# Patient Record
Sex: MALE | Race: WHITE | NOT HISPANIC OR LATINO | Employment: OTHER | URBAN - METROPOLITAN AREA
[De-identification: names, ages, dates, MRNs, and addresses within clinical notes are randomized per-mention and may not be internally consistent; named-entity substitution may affect disease eponyms.]

---

## 2023-04-26 ENCOUNTER — HOSPITAL ENCOUNTER (INPATIENT)
Facility: HOSPITAL | Age: 63
LOS: 2 days | Discharge: HOME | DRG: 38 | End: 2023-04-29
Attending: EMERGENCY MEDICINE | Admitting: INTERNAL MEDICINE
Payer: COMMERCIAL

## 2023-04-26 ENCOUNTER — APPOINTMENT (OUTPATIENT)
Dept: CT IMAGING | Facility: HOSPITAL | Age: 63
DRG: 38 | End: 2023-04-26
Payer: COMMERCIAL

## 2023-04-26 DIAGNOSIS — I65.23 INTERNAL CAROTID ARTERY STENOSIS, BILATERAL: ICD-10-CM

## 2023-04-26 DIAGNOSIS — I65.23 BILATERAL CAROTID ARTERY STENOSIS: Chronic | ICD-10-CM

## 2023-04-26 DIAGNOSIS — H53.9 CHANGES IN VISION: Primary | ICD-10-CM

## 2023-04-26 DIAGNOSIS — Q21.12 PFO (PATENT FORAMEN OVALE): ICD-10-CM

## 2023-04-26 LAB
ALBUMIN SERPL-MCNC: 4.1 G/DL (ref 3.5–5.2)
ALBUMIN/GLOB SERPL: 1.6 G/DL
ALP SERPL-CCNC: 67 U/L (ref 39–117)
ALT SERPL W P-5'-P-CCNC: 8 U/L (ref 1–41)
ANION GAP SERPL CALCULATED.3IONS-SCNC: 11.4 MMOL/L (ref 5–15)
APTT PPP: 26.8 SECONDS (ref 22.7–35.4)
AST SERPL-CCNC: 12 U/L (ref 1–40)
BASOPHILS # BLD AUTO: 0.04 10*3/MM3 (ref 0–0.2)
BASOPHILS NFR BLD AUTO: 0.4 % (ref 0–1.5)
BILIRUB SERPL-MCNC: 0.3 MG/DL (ref 0–1.2)
BUN SERPL-MCNC: 14 MG/DL (ref 8–23)
BUN/CREAT SERPL: 15.1 (ref 7–25)
CALCIUM SPEC-SCNC: 9.4 MG/DL (ref 8.6–10.5)
CHLORIDE SERPL-SCNC: 106 MMOL/L (ref 98–107)
CO2 SERPL-SCNC: 25.6 MMOL/L (ref 22–29)
CREAT SERPL-MCNC: 0.93 MG/DL (ref 0.76–1.27)
DEPRECATED RDW RBC AUTO: 46 FL (ref 37–54)
EGFRCR SERPLBLD CKD-EPI 2021: 91.7 ML/MIN/1.73
EOSINOPHIL # BLD AUTO: 0.14 10*3/MM3 (ref 0–0.4)
EOSINOPHIL NFR BLD AUTO: 1.5 % (ref 0.3–6.2)
ERYTHROCYTE [DISTWIDTH] IN BLOOD BY AUTOMATED COUNT: 14.5 % (ref 12.3–15.4)
GLOBULIN UR ELPH-MCNC: 2.6 GM/DL
GLUCOSE SERPL-MCNC: 122 MG/DL (ref 65–99)
HBA1C MFR BLD: 5.5 % (ref 4.8–5.6)
HCT VFR BLD AUTO: 33.9 % (ref 37.5–51)
HGB BLD-MCNC: 11.6 G/DL (ref 13–17.7)
IMM GRANULOCYTES # BLD AUTO: 0.02 10*3/MM3 (ref 0–0.05)
IMM GRANULOCYTES NFR BLD AUTO: 0.2 % (ref 0–0.5)
INR PPP: 1.04 (ref 0.9–1.1)
LYMPHOCYTES # BLD AUTO: 2.02 10*3/MM3 (ref 0.7–3.1)
LYMPHOCYTES NFR BLD AUTO: 22.1 % (ref 19.6–45.3)
MCH RBC QN AUTO: 29.5 PG (ref 26.6–33)
MCHC RBC AUTO-ENTMCNC: 34.2 G/DL (ref 31.5–35.7)
MCV RBC AUTO: 86.3 FL (ref 79–97)
MONOCYTES # BLD AUTO: 0.66 10*3/MM3 (ref 0.1–0.9)
MONOCYTES NFR BLD AUTO: 7.2 % (ref 5–12)
NEUTROPHILS NFR BLD AUTO: 6.28 10*3/MM3 (ref 1.7–7)
NEUTROPHILS NFR BLD AUTO: 68.6 % (ref 42.7–76)
NRBC BLD AUTO-RTO: 0 /100 WBC (ref 0–0.2)
PLATELET # BLD AUTO: 260 10*3/MM3 (ref 140–450)
PMV BLD AUTO: 8.5 FL (ref 6–12)
POTASSIUM SERPL-SCNC: 4 MMOL/L (ref 3.5–5.2)
PROT SERPL-MCNC: 6.7 G/DL (ref 6–8.5)
PROTHROMBIN TIME: 13.7 SECONDS (ref 11.7–14.2)
RBC # BLD AUTO: 3.93 10*6/MM3 (ref 4.14–5.8)
SODIUM SERPL-SCNC: 143 MMOL/L (ref 136–145)
T4 FREE SERPL-MCNC: 1.26 NG/DL (ref 0.93–1.7)
TSH SERPL DL<=0.05 MIU/L-ACNC: 1.9 UIU/ML (ref 0.27–4.2)
WBC NRBC COR # BLD: 9.16 10*3/MM3 (ref 3.4–10.8)

## 2023-04-26 PROCEDURE — 93010 ELECTROCARDIOGRAM REPORT: CPT | Performed by: INTERNAL MEDICINE

## 2023-04-26 PROCEDURE — 70498 CT ANGIOGRAPHY NECK: CPT

## 2023-04-26 PROCEDURE — 70496 CT ANGIOGRAPHY HEAD: CPT

## 2023-04-26 PROCEDURE — 85730 THROMBOPLASTIN TIME PARTIAL: CPT | Performed by: EMERGENCY MEDICINE

## 2023-04-26 PROCEDURE — 84439 ASSAY OF FREE THYROXINE: CPT | Performed by: EMERGENCY MEDICINE

## 2023-04-26 PROCEDURE — 83036 HEMOGLOBIN GLYCOSYLATED A1C: CPT | Performed by: EMERGENCY MEDICINE

## 2023-04-26 PROCEDURE — 93005 ELECTROCARDIOGRAM TRACING: CPT | Performed by: EMERGENCY MEDICINE

## 2023-04-26 PROCEDURE — 99285 EMERGENCY DEPT VISIT HI MDM: CPT

## 2023-04-26 PROCEDURE — 85610 PROTHROMBIN TIME: CPT | Performed by: EMERGENCY MEDICINE

## 2023-04-26 PROCEDURE — 25510000001 IOPAMIDOL PER 1 ML: Performed by: EMERGENCY MEDICINE

## 2023-04-26 PROCEDURE — 80050 GENERAL HEALTH PANEL: CPT | Performed by: EMERGENCY MEDICINE

## 2023-04-26 RX ORDER — SODIUM CHLORIDE 0.9 % (FLUSH) 0.9 %
10 SYRINGE (ML) INJECTION AS NEEDED
Status: DISCONTINUED | OUTPATIENT
Start: 2023-04-26 | End: 2023-04-29 | Stop reason: HOSPADM

## 2023-04-26 RX ORDER — HYDROCODONE BITARTRATE AND ACETAMINOPHEN 10; 325 MG/1; MG/1
TABLET ORAL
COMMUNITY

## 2023-04-26 RX ORDER — ASPIRIN 325 MG
325 TABLET ORAL ONCE
Status: COMPLETED | OUTPATIENT
Start: 2023-04-26 | End: 2023-04-26

## 2023-04-26 RX ORDER — GABAPENTIN 600 MG/1
TABLET ORAL EVERY 24 HOURS
COMMUNITY
End: 2023-05-03

## 2023-04-26 RX ADMIN — IOPAMIDOL 95 ML: 755 INJECTION, SOLUTION INTRAVENOUS at 21:21

## 2023-04-26 RX ADMIN — ASPIRIN 325 MG: 325 TABLET ORAL at 23:32

## 2023-04-26 NOTE — ED TRIAGE NOTES
Pt comes in for plaque in his left retinal artery. Pt went in to see his eye doctor because there was a spot in his left peripheral vision that he wanted checked out.

## 2023-04-26 NOTE — ED NOTES
Patient to ER via car from home for loss of vision in L eye x 1 week  Patient saw eye dr was told he had plaque in retinal artery

## 2023-04-27 ENCOUNTER — APPOINTMENT (OUTPATIENT)
Dept: CARDIOLOGY | Facility: HOSPITAL | Age: 63
DRG: 38 | End: 2023-04-27
Payer: COMMERCIAL

## 2023-04-27 ENCOUNTER — APPOINTMENT (OUTPATIENT)
Dept: MRI IMAGING | Facility: HOSPITAL | Age: 63
DRG: 38 | End: 2023-04-27
Payer: COMMERCIAL

## 2023-04-27 PROBLEM — H53.9 CHANGES IN VISION: Status: ACTIVE | Noted: 2023-04-27

## 2023-04-27 PROBLEM — G89.29 CHRONIC PAIN: Status: ACTIVE | Noted: 2023-04-27

## 2023-04-27 PROBLEM — Z72.0 TOBACCO ABUSE: Status: ACTIVE | Noted: 2023-04-27

## 2023-04-27 PROBLEM — J44.9 COPD (CHRONIC OBSTRUCTIVE PULMONARY DISEASE): Chronic | Status: ACTIVE | Noted: 2023-04-27

## 2023-04-27 PROBLEM — I65.23 BILATERAL CAROTID ARTERY STENOSIS: Chronic | Status: ACTIVE | Noted: 2023-04-27

## 2023-04-27 LAB
ALBUMIN SERPL-MCNC: 3.9 G/DL (ref 3.5–5.2)
ALBUMIN/GLOB SERPL: 1.3 G/DL
ALP SERPL-CCNC: 70 U/L (ref 39–117)
ALT SERPL W P-5'-P-CCNC: 12 U/L (ref 1–41)
ANION GAP SERPL CALCULATED.3IONS-SCNC: 7.5 MMOL/L (ref 5–15)
AST SERPL-CCNC: 13 U/L (ref 1–40)
BH CV XLRA MEAS LEFT DIST CCA EDV: 23.2 CM/SEC
BH CV XLRA MEAS LEFT DIST CCA PSV: 77.3 CM/SEC
BH CV XLRA MEAS LEFT DIST ICA EDV: -46.7 CM/SEC
BH CV XLRA MEAS LEFT DIST ICA PSV: -105 CM/SEC
BH CV XLRA MEAS LEFT ICA/CCA RATIO: 1.88
BH CV XLRA MEAS LEFT MID ICA EDV: -36.7 CM/SEC
BH CV XLRA MEAS LEFT MID ICA PSV: -113 CM/SEC
BH CV XLRA MEAS LEFT PROX CCA EDV: 19.9 CM/SEC
BH CV XLRA MEAS LEFT PROX CCA PSV: 75.9 CM/SEC
BH CV XLRA MEAS LEFT PROX ECA EDV: -17.4 CM/SEC
BH CV XLRA MEAS LEFT PROX ECA PSV: -177 CM/SEC
BH CV XLRA MEAS LEFT PROX ICA EDV: 65.4 CM/SEC
BH CV XLRA MEAS LEFT PROX ICA PSV: 145 CM/SEC
BH CV XLRA MEAS LEFT PROX SCLA PSV: 165 CM/SEC
BH CV XLRA MEAS LEFT VERTEBRAL A EDV: -20.4 CM/SEC
BH CV XLRA MEAS LEFT VERTEBRAL A PSV: -53.1 CM/SEC
BH CV XLRA MEAS RIGHT DIST CCA EDV: 17.1 CM/SEC
BH CV XLRA MEAS RIGHT DIST CCA PSV: 79.2 CM/SEC
BH CV XLRA MEAS RIGHT DIST ICA EDV: -33.7 CM/SEC
BH CV XLRA MEAS RIGHT DIST ICA PSV: -77.3 CM/SEC
BH CV XLRA MEAS RIGHT ICA/CCA RATIO: 1.51
BH CV XLRA MEAS RIGHT MID ICA EDV: -34 CM/SEC
BH CV XLRA MEAS RIGHT MID ICA PSV: -82.1 CM/SEC
BH CV XLRA MEAS RIGHT PROX CCA EDV: 19 CM/SEC
BH CV XLRA MEAS RIGHT PROX CCA PSV: 79.2 CM/SEC
BH CV XLRA MEAS RIGHT PROX ECA EDV: 20.5 CM/SEC
BH CV XLRA MEAS RIGHT PROX ECA PSV: 243 CM/SEC
BH CV XLRA MEAS RIGHT PROX ICA EDV: -40.8 CM/SEC
BH CV XLRA MEAS RIGHT PROX ICA PSV: -119 CM/SEC
BH CV XLRA MEAS RIGHT PROX SCLA PSV: 153 CM/SEC
BH CV XLRA MEAS RIGHT VERTEBRAL A EDV: 12.4 CM/SEC
BH CV XLRA MEAS RIGHT VERTEBRAL A PSV: 40.5 CM/SEC
BILIRUB SERPL-MCNC: 0.4 MG/DL (ref 0–1.2)
BUN SERPL-MCNC: 10 MG/DL (ref 8–23)
BUN/CREAT SERPL: 10.8 (ref 7–25)
CALCIUM SPEC-SCNC: 9.5 MG/DL (ref 8.6–10.5)
CHLORIDE SERPL-SCNC: 105 MMOL/L (ref 98–107)
CHOLEST SERPL-MCNC: 199 MG/DL (ref 0–200)
CO2 SERPL-SCNC: 26.5 MMOL/L (ref 22–29)
CREAT SERPL-MCNC: 0.93 MG/DL (ref 0.76–1.27)
DEPRECATED RDW RBC AUTO: 46.8 FL (ref 37–54)
EGFRCR SERPLBLD CKD-EPI 2021: 91.7 ML/MIN/1.73
ERYTHROCYTE [DISTWIDTH] IN BLOOD BY AUTOMATED COUNT: 14.7 % (ref 12.3–15.4)
GLOBULIN UR ELPH-MCNC: 3 GM/DL
GLUCOSE BLDC GLUCOMTR-MCNC: 100 MG/DL (ref 70–130)
GLUCOSE SERPL-MCNC: 128 MG/DL (ref 65–99)
HCT VFR BLD AUTO: 35.5 % (ref 37.5–51)
HDLC SERPL-MCNC: 50 MG/DL (ref 40–60)
HGB BLD-MCNC: 11.9 G/DL (ref 13–17.7)
LDLC SERPL CALC-MCNC: 133 MG/DL (ref 0–100)
LDLC/HDLC SERPL: 2.62 {RATIO}
LEFT ARM BP: NORMAL MMHG
MAXIMAL PREDICTED HEART RATE: 156 BPM
MCH RBC QN AUTO: 29.2 PG (ref 26.6–33)
MCHC RBC AUTO-ENTMCNC: 33.5 G/DL (ref 31.5–35.7)
MCV RBC AUTO: 87 FL (ref 79–97)
PLATELET # BLD AUTO: 273 10*3/MM3 (ref 140–450)
PMV BLD AUTO: 8.5 FL (ref 6–12)
POTASSIUM SERPL-SCNC: 4 MMOL/L (ref 3.5–5.2)
PROT SERPL-MCNC: 6.9 G/DL (ref 6–8.5)
QT INTERVAL: 370 MS
RBC # BLD AUTO: 4.08 10*6/MM3 (ref 4.14–5.8)
RIGHT ARM BP: NORMAL MMHG
SODIUM SERPL-SCNC: 139 MMOL/L (ref 136–145)
STRESS TARGET HR: 133 BPM
TRIGL SERPL-MCNC: 90 MG/DL (ref 0–150)
TSH SERPL DL<=0.05 MIU/L-ACNC: 1.46 UIU/ML (ref 0.27–4.2)
VLDLC SERPL-MCNC: 16 MG/DL (ref 5–40)
WBC NRBC COR # BLD: 8.17 10*3/MM3 (ref 3.4–10.8)

## 2023-04-27 PROCEDURE — 80061 LIPID PANEL: CPT | Performed by: NURSE PRACTITIONER

## 2023-04-27 PROCEDURE — 93306 TTE W/DOPPLER COMPLETE: CPT | Performed by: INTERNAL MEDICINE

## 2023-04-27 PROCEDURE — 82962 GLUCOSE BLOOD TEST: CPT

## 2023-04-27 PROCEDURE — 97162 PT EVAL MOD COMPLEX 30 MIN: CPT

## 2023-04-27 PROCEDURE — 80050 GENERAL HEALTH PANEL: CPT | Performed by: NURSE PRACTITIONER

## 2023-04-27 PROCEDURE — 97530 THERAPEUTIC ACTIVITIES: CPT

## 2023-04-27 PROCEDURE — 70551 MRI BRAIN STEM W/O DYE: CPT

## 2023-04-27 PROCEDURE — 99223 1ST HOSP IP/OBS HIGH 75: CPT | Performed by: PSYCHIATRY & NEUROLOGY

## 2023-04-27 PROCEDURE — 93306 TTE W/DOPPLER COMPLETE: CPT

## 2023-04-27 PROCEDURE — 93880 EXTRACRANIAL BILAT STUDY: CPT

## 2023-04-27 PROCEDURE — 97165 OT EVAL LOW COMPLEX 30 MIN: CPT

## 2023-04-27 PROCEDURE — 25510000001 PERFLUTREN (DEFINITY) 8.476 MG IN SODIUM CHLORIDE (PF) 0.9 % 10 ML INJECTION: Performed by: NURSE PRACTITIONER

## 2023-04-27 RX ORDER — ASPIRIN 300 MG/1
300 SUPPOSITORY RECTAL DAILY
Status: DISCONTINUED | OUTPATIENT
Start: 2023-04-27 | End: 2023-04-29

## 2023-04-27 RX ORDER — ASPIRIN 325 MG
325 TABLET ORAL DAILY
Status: DISCONTINUED | OUTPATIENT
Start: 2023-04-27 | End: 2023-04-29

## 2023-04-27 RX ORDER — ACETAMINOPHEN 325 MG/1
650 TABLET ORAL EVERY 4 HOURS PRN
Status: DISCONTINUED | OUTPATIENT
Start: 2023-04-27 | End: 2023-04-29 | Stop reason: HOSPADM

## 2023-04-27 RX ORDER — ACETAMINOPHEN 650 MG/1
650 SUPPOSITORY RECTAL EVERY 4 HOURS PRN
Status: DISCONTINUED | OUTPATIENT
Start: 2023-04-27 | End: 2023-04-29 | Stop reason: HOSPADM

## 2023-04-27 RX ORDER — ONDANSETRON 2 MG/ML
4 INJECTION INTRAMUSCULAR; INTRAVENOUS EVERY 6 HOURS PRN
Status: DISCONTINUED | OUTPATIENT
Start: 2023-04-27 | End: 2023-04-29 | Stop reason: HOSPADM

## 2023-04-27 RX ORDER — GABAPENTIN 300 MG/1
600 CAPSULE ORAL DAILY
Status: DISCONTINUED | OUTPATIENT
Start: 2023-04-27 | End: 2023-04-29 | Stop reason: HOSPADM

## 2023-04-27 RX ORDER — ATORVASTATIN CALCIUM 80 MG/1
80 TABLET, FILM COATED ORAL NIGHTLY
Status: DISCONTINUED | OUTPATIENT
Start: 2023-04-27 | End: 2023-04-29 | Stop reason: HOSPADM

## 2023-04-27 RX ORDER — HYDROCODONE BITARTRATE AND ACETAMINOPHEN 10; 325 MG/1; MG/1
1 TABLET ORAL EVERY 4 HOURS PRN
Status: DISCONTINUED | OUTPATIENT
Start: 2023-04-27 | End: 2023-04-29 | Stop reason: HOSPADM

## 2023-04-27 RX ORDER — SODIUM CHLORIDE 9 MG/ML
75 INJECTION, SOLUTION INTRAVENOUS CONTINUOUS
Status: DISCONTINUED | OUTPATIENT
Start: 2023-04-27 | End: 2023-04-27

## 2023-04-27 RX ORDER — BISACODYL 10 MG
10 SUPPOSITORY, RECTAL RECTAL DAILY PRN
Status: DISCONTINUED | OUTPATIENT
Start: 2023-04-27 | End: 2023-04-29 | Stop reason: HOSPADM

## 2023-04-27 RX ADMIN — ASPIRIN 325 MG: 325 TABLET ORAL at 08:30

## 2023-04-27 RX ADMIN — ATORVASTATIN CALCIUM 80 MG: 80 TABLET, FILM COATED ORAL at 20:40

## 2023-04-27 RX ADMIN — SODIUM CHLORIDE 75 ML/HR: 9 INJECTION, SOLUTION INTRAVENOUS at 06:51

## 2023-04-27 RX ADMIN — HYDROCODONE BITARTRATE AND ACETAMINOPHEN 1 TABLET: 10; 325 TABLET ORAL at 07:01

## 2023-04-27 RX ADMIN — HYDROCODONE BITARTRATE AND ACETAMINOPHEN 1 TABLET: 10; 325 TABLET ORAL at 17:19

## 2023-04-27 RX ADMIN — HYDROCODONE BITARTRATE AND ACETAMINOPHEN 1 TABLET: 10; 325 TABLET ORAL at 12:19

## 2023-04-27 RX ADMIN — HYDROCODONE BITARTRATE AND ACETAMINOPHEN 1 TABLET: 10; 325 TABLET ORAL at 21:21

## 2023-04-27 RX ADMIN — PERFLUTREN 2 ML: 6.52 INJECTION, SUSPENSION INTRAVENOUS at 17:11

## 2023-04-27 NOTE — H&P
Patient Name:  Rito Arnold  YOB: 1960  MRN:  [id number]  Admit Date:  4/26/2023  Patient Care Team:  Provider, No Known as PCP - General      Subjective   History Present Illness     Chief Complaint   Patient presents with   • Eye Problem       Mr. Arnold is a 62 y.o. smoker with a history of chronic pain syndrome that presents to [facility] complaining of visual changes in his left eye.  He reports cloudy vision in the right inferior aspect of the left eye that initiated about 10 days ago.  He states he was seen by an ophthalmologist yesterday and there were concerns for left retinal artery occlusion.  He was sent to the ED for further evaluation.  He reports intermittent headaches but denies paresthesias, facial asymmetry, speech disturbance, and weakness.  A CT angiogram of the neck performed in the ED revealed a 70% stenosis of the proximal right and left ICA.      History of Present Illness  Review of Systems   Constitutional: Negative for chills and fever.   HENT: Negative for congestion and sore throat.    Eyes: Positive for visual disturbance. Negative for photophobia, pain and redness.   Respiratory: Negative for cough and shortness of breath.    Cardiovascular: Negative for chest pain, palpitations and leg swelling.   Gastrointestinal: Negative for abdominal pain, nausea and vomiting.   Endocrine: Negative for polydipsia, polyphagia and polyuria.   Musculoskeletal: Negative for arthralgias and myalgias.   Skin: Negative for rash and wound.   Neurological: Positive for headaches. Negative for dizziness, facial asymmetry, speech difficulty, weakness, light-headedness and numbness.        Personal History     History reviewed. No pertinent past medical history.  History reviewed. No pertinent surgical history.  History reviewed. No pertinent family history.  Social History     Tobacco Use   • Smoking status: Every Day     Packs/day: 1.50     Years: 20.00     Pack years:  30.00     Types: Cigarettes     Passive exposure: Current   • Smokeless tobacco: Never   Vaping Use   • Vaping Use: Never used   Substance Use Topics   • Alcohol use: Defer   • Drug use: Never     Medications Prior to Admission   Medication Sig Dispense Refill Last Dose   • gabapentin (NEURONTIN) 600 MG tablet Daily.   Past Week   • HYDROcodone-acetaminophen (NORCO)  MG per tablet hydrocodone 10 mg-acetaminophen 325 mg tablet   4/26/2023     Allergies:  No Known Allergies    Objective    Objective     Vital Signs  Temp:  [97.4 °F (36.3 °C)-98.3 °F (36.8 °C)] 97.4 °F (36.3 °C)  Heart Rate:  [] 65  Resp:  [18] 18  BP: (129-166)/(76-97) 145/94  SpO2:  [92 %-98 %] 97 %  on   ;   Device (Oxygen Therapy): room air  Body mass index is 27.46 kg/m².    Physical Exam    Results Review:  I reviewed the patient's new clinical results.  I reviewed the patient's new imaging results and agree with the interpretation.  I reviewed the patient's other test results and agree with the interpretation  I personally viewed and interpreted the patient's EKG/Telemetry data  Discussed with ED provider.    Lab Results (last 24 hours)     Procedure Component Value Units Date/Time    CBC & Differential [[id number]]  (Abnormal) Collected: 04/26/23 2023    Specimen: Blood Updated: 04/26/23 2036    Narrative:      The following orders were created for panel order CBC & Differential.  Procedure                               Abnormality         Status                     ---------                               -----------         ------                     CBC Auto Differential[[id number]]        Abnormal            Final result                 Please view results for these tests on the individual orders.    Comprehensive Metabolic Panel [[id number]]  (Abnormal) Collected: 04/26/23 2023    Specimen: Blood Updated: 04/26/23 2059     Glucose 122 mg/dL      BUN 14 mg/dL      Creatinine 0.93 mg/dL      Sodium 143 mmol/L      Potassium 4.0  mmol/L      Chloride 106 mmol/L      CO2 25.6 mmol/L      Calcium 9.4 mg/dL      Total Protein 6.7 g/dL      Albumin 4.1 g/dL      ALT (SGPT) 8 U/L      AST (SGOT) 12 U/L      Alkaline Phosphatase 67 U/L      Total Bilirubin 0.3 mg/dL      Globulin 2.6 gm/dL      A/G Ratio 1.6 g/dL      BUN/Creatinine Ratio 15.1     Anion Gap 11.4 mmol/L      eGFR 91.7 mL/min/1.73     Narrative:      GFR Normal >60  Chronic Kidney Disease <60  Kidney Failure <15      Protime-INR [[id number]]  (Normal) Collected: 04/26/23 2023    Specimen: Blood Updated: 04/26/23 2051     Protime 13.7 Seconds      INR 1.04    aPTT [[id number]]  (Normal) Collected: 04/26/23 2023    Specimen: Blood Updated: 04/26/23 2051     PTT 26.8 seconds     CBC Auto Differential [[id number]]  (Abnormal) Collected: 04/26/23 2023    Specimen: Blood Updated: 04/26/23 2036     WBC 9.16 10*3/mm3      RBC 3.93 10*6/mm3      Hemoglobin 11.6 g/dL      Hematocrit 33.9 %      MCV 86.3 fL      MCH 29.5 pg      MCHC 34.2 g/dL      RDW 14.5 %      RDW-SD 46.0 fl      MPV 8.5 fL      Platelets 260 10*3/mm3      Neutrophil % 68.6 %      Lymphocyte % 22.1 %      Monocyte % 7.2 %      Eosinophil % 1.5 %      Basophil % 0.4 %      Immature Grans % 0.2 %      Neutrophils, Absolute 6.28 10*3/mm3      Lymphocytes, Absolute 2.02 10*3/mm3      Monocytes, Absolute 0.66 10*3/mm3      Eosinophils, Absolute 0.14 10*3/mm3      Basophils, Absolute 0.04 10*3/mm3      Immature Grans, Absolute 0.02 10*3/mm3      nRBC 0.0 /100 WBC     Hemoglobin A1c [[id number]]  (Normal) Collected: 04/26/23 2023    Specimen: Blood Updated: 04/26/23 2321     Hemoglobin A1C 5.50 %     Narrative:      Hemoglobin A1C Ranges:    Increased Risk for Diabetes  5.7% to 6.4%  Diabetes                     >= 6.5%  Diabetic Goal                < 7.0%    TSH [[id number]]  (Normal) Collected: 04/26/23 2023    Specimen: Blood Updated: 04/26/23 2346     TSH 1.900 uIU/mL     T4, Free [[id number]]  (Normal) Collected: 04/26/23 2023     Specimen: Blood Updated: 04/26/23 2346     Free T4 1.26 ng/dL     Narrative:      Results may be falsely increased if patient taking Biotin.            Imaging Results (Last 24 Hours)     Procedure Component Value Units Date/Time    CT Angiogram Neck [[id number]] Collected: 04/26/23 2303     Updated: 04/26/23 2303    Narrative:        Patient: YANELY TOBAR  Time Out: 23:03  Exam(s): CTA NECK     EXAM:    CT Neck With Intravenous Contrast    CLINICAL HISTORY:     Reason for exam: Left eye vision changes.    TECHNIQUE:    Routine carotid CT protocol was performed with intravenous contrast.    NASCET criteria using the distal ICAs for comparison were used for   evaluation of stenoses.  CTDI is 115 mGy and DLP is 2725 mGy-cm.  This CT   exam was performed according to the principle of ALARA (As Low As   Reasonably Achievable) by using one or more of the following dose   reduction techniques: automated exposure control, adjustment of the mA   and or kV according to patient size, and or use of iterative   reconstruction technique.    COMPARISON:    None.    FINDINGS:     VASCULATURE:    Right common carotid artery:  Unremarkable.  No occlusion or   significant stenosis.  No dissection.    Right internal carotid artery: There is a 70% stenosis of the proximal   right ICA.  No dissection.    Right external carotid artery:  Unremarkable.  No occlusion.    Right vertebral artery:  Unremarkable.  No occlusion or significant   stenosis.  No dissection.      Left common carotid artery: There is a 70% stenosis of the proximal   left ICA.  No dissection.    Left external carotid artery:  Unremarkable.  No occlusion.    Left vertebral artery:  Unremarkable.  No occlusion or significant   stenosis.  No dissection.     NECK:    Bones joints: Moderate to advanced disc degeneration at C3-4, C4-5, C5-  6 and C6-7 with a mild spinal canal stenosis at C6-7.  Remote fracture   deformities of T4 and T5.    Soft tissues: Prominent  mediastinal and hilar lymph nodes.  Prominent   cervical lymph nodes.    Lung apices: Findings concerning for bronchitis with pneumonitis and   chronic interstitial scarring with moderate centrilobular emphysematous   changes.     CAROTID STENOSIS REFERENCE USING NASCET CRITERIA:    % ICA stenosis = (1 - narrowest ICA diameter diameter of distal   cervical ICA) x 100.    Mild - <50% stenosis.    Moderate - 50-69% stenosis.    Severe - 70-94% stenosis.    Near occlusion - 95-99% stenosis.    Occluded - 100% stenosis.    IMPRESSION:       There is a 70% stenosis of the proximal right and left ICA.      Impression:          Electronically signed by Stephanie Guzman MD on 04-26-23 at 2303    CT Angiogram Head [[id number]] Collected: 04/26/23 2256     Updated: 04/26/23 2256    Narrative:        Patient: YANELY TOBAR  Time Out: 22:55  Exam(s): CTA HEAD     EXAM:    CT Head With Intravenous Contrast    CLINICAL HISTORY:     Reason for exam: Left eye vision changes.    TECHNIQUE:  AI analysis of LVO was utilized    Axial computed tomographic images of the head with intravenous contrast.    CTDI is 115 mGy and DLP is 2725 mGy-cm.  This CT exam was performed   according to the principle of ALARA (As Low As Reasonably Achievable) by   using one or more of the following dose reduction techniques: automated   exposure control, adjustment of the mA and or kV according to patient   size, and or use of iterative reconstruction technique.    COMPARISON:    No relevant prior studies available.    FINDINGS:    Right internal carotid artery:  No acute findings.  Intracranial   segment is patent with no significant stenosis.  No aneurysm.    Right anterior cerebral artery:  Unremarkable.  No occlusion or   significant stenosis.  No aneurysm.    Right middle cerebral artery:  Unremarkable.  No occlusion or   significant stenosis.  No aneurysm.    Right posterior cerebral artery:  Unremarkable.  No occlusion or   significant stenosis.  No  aneurysm.    Right vertebral artery:  Unremarkable as visualized.      Left internal carotid artery:  No acute findings.  Intracranial segment   is patent with no significant stenosis.  No aneurysm.    Left anterior cerebral artery:  Unremarkable.  No occlusion or   significant stenosis.  No aneurysm.    Left middle cerebral artery:  Unremarkable.  No occlusion or   significant stenosis.  No aneurysm.    Left posterior cerebral artery:  Unremarkable.  No occlusion or   significant stenosis.  No aneurysm.    Left vertebral artery:  Unremarkable as visualized.      Basilar artery:  Unremarkable.  No occlusion or significant stenosis.    No aneurysm.    IMPRESSION:       Negative CT angiogram of the head.    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      Impression:          Electronically signed by Stephanie Guzman MD on 04-26-23 at 2255              ECG 12 Lead Other; vision changes   Preliminary Result   HEART RATE= 83  bpm   RR Interval= 723  ms   NM Interval= 182  ms   P Horizontal Axis= -29  deg   P Front Axis= 34  deg   QRSD Interval= 83  ms   QT Interval= 370  ms   QRS Axis= 18  deg   T Wave Axis= 50  deg   - NORMAL ECG -   Sinus rhythm   Baseline wander in lead(s) II,III,aVF   Electronically Signed By:    Date and Time of Study: 2023-04-26 20:22:15           Assessment/Plan     Active Hospital Problems    Diagnosis  POA   • **Visual disturbance [H53.9]  Unknown   • Tobacco abuse [Z72.0]  Unknown   • Chronic pain [G89.29]  Unknown       Left Eye Visual Disturbance  -Admit to complete stroke work up  -Check MRI and echo  -Neurology consult  -Vascular surgery consult for carotid stenosis  -Neuro checks/NIHSS  -Check TSH, lipid panel, and hgb A1C to assess for secondary risk factors  -Initiate daily aspirin and high dose statin  -PT/OT consults    Chronic Pain Syndrome  -Continue home dose of Norco and gabapentin    Anemia  -No signs of active  bleeding  -He has no previous lab work to determine his baseline hgb  -Repeat CBC in AM    Tobacco Abuse  -Nicotine patch    -I discussed the patients findings and my recommendations with patient.    VTE Prophylaxis - SCDs.  Code Status - Full code.       FRANCISCO JAVIER Rain  [facility]  04/27/23  05:02 EDT

## 2023-04-27 NOTE — ED NOTES
Nursing report ED to floor  Rito Arnold  62 y.o.  male    HPI :   Chief Complaint   Patient presents with    Eye Problem       Admitting doctor:   Vu Howard MD    Admitting diagnosis:   The primary encounter diagnosis was Changes in vision. A diagnosis of Internal carotid artery stenosis, bilateral was also pertinent to this visit.    Code status:   Current Code Status       Date Active Code Status Order ID Comments User Context       Not on file            Allergies:   Patient has no known allergies.    Isolation:   No active isolations    Intake and Output  No intake or output data in the 24 hours ending 04/26/23 2357    Weight:       04/26/23  1852   Weight: 88.5 kg (195 lb)       Most recent vitals:   Vitals:    04/26/23 2101 04/26/23 2201 04/26/23 2231 04/26/23 2233   BP: 144/96 134/85 129/76    Pulse: 73 79 71 67   Resp:       Temp:       SpO2: 93% 92% 93% 93%   Weight:       Height:           Active LDAs/IV Access:   Lines, Drains & Airways       Active LDAs       Name Placement date Placement time Site Days    Peripheral IV 04/26/23 2023 Right Antecubital 04/26/23 2023  Antecubital  less than 1                    Labs (abnormal labs have a star):   Labs Reviewed   COMPREHENSIVE METABOLIC PANEL - Abnormal; Notable for the following components:       Result Value    Glucose 122 (*)     All other components within normal limits    Narrative:     GFR Normal >60  Chronic Kidney Disease <60  Kidney Failure <15     CBC WITH AUTO DIFFERENTIAL - Abnormal; Notable for the following components:    RBC 3.93 (*)     Hemoglobin 11.6 (*)     Hematocrit 33.9 (*)     All other components within normal limits   PROTIME-INR - Normal   APTT - Normal   HEMOGLOBIN A1C - Normal    Narrative:     Hemoglobin A1C Ranges:    Increased Risk for Diabetes  5.7% to 6.4%  Diabetes                     >= 6.5%  Diabetic Goal                < 7.0%   TSH - Normal   T4, FREE - Normal    Narrative:     Results may be falsely increased if  patient taking Biotin.     CBC AND DIFFERENTIAL    Narrative:     The following orders were created for panel order CBC & Differential.  Procedure                               Abnormality         Status                     ---------                               -----------         ------                     CBC Auto Differential[[id number]]        Abnormal            Final result                 Please view results for these tests on the individual orders.       EKG:   ECG 12 Lead Other; vision changes   Preliminary Result   HEART RATE= 83  bpm   RR Interval= 723  ms   IL Interval= 182  ms   P Horizontal Axis= -29  deg   P Front Axis= 34  deg   QRSD Interval= 83  ms   QT Interval= 370  ms   QRS Axis= 18  deg   T Wave Axis= 50  deg   - NORMAL ECG -   Sinus rhythm   Baseline wander in lead(s) II,III,aVF   Electronically Signed By:    Date and Time of Study: 2023-04-26 20:22:15          Meds given in ED:   Medications   sodium chloride 0.9 % flush 10 mL (has no administration in time range)   iopamidol (ISOVUE-370) 76 % injection 95 mL (95 mL Intravenous Given by Other 4/26/23 2121)   aspirin tablet 325 mg (325 mg Oral Given 4/26/23 2332)       Imaging results:  CT Angiogram Neck    Result Date: 4/26/2023  Electronically signed by Stephanie Guzman MD on 04-26-23 at 2303    CT Angiogram Head    Result Date: 4/26/2023  Electronically signed by Stephanie Guzman MD on 04-26-23 at 2255     Ambulatory status:   - ad martine    Social issues:   Social History     Socioeconomic History    Marital status:        NIH Stroke Scale:         Macy Pruitt RN  04/26/23 23:57 EDT     Call [phone] with questions

## 2023-04-27 NOTE — PLAN OF CARE
Goal Outcome Evaluation:     Alert and oriented. No neuro deficits noted. Complaints of back pain, medicated with prn norco. Up ad martine in room. Plan for carotid endarterectomy tomorrow.

## 2023-04-27 NOTE — PLAN OF CARE
Goal Outcome Evaluation:              Outcome Evaluation: Discussed with RN. Pt passed the nursing swallow screen and is tolerating a regular diet. MRI was negative for CVA. ST to sign off at this time. Please reconsult if needed.

## 2023-04-27 NOTE — PROGRESS NOTES
[facility] Acute Inpt Rehab Note     Referral received via stroke order set.  Please note this is a screening only, rehab admissions will not actively be evaluating this patient.  If felt patient is appropriate for our services once therapies begin, please call our office at [phone], to initiate a full referral.    Thank you,   Merle Lerner RN   Rehab Admission Nurse

## 2023-04-27 NOTE — THERAPY EVALUATION
Patient Name: Rito Arnold  : 1960    MRN: [id number]                              Today's Date: 2023       Admit Date: 2023    Visit Dx:     ICD-10-CM ICD-9-CM   1. Changes in vision  H53.9 368.9   2. Internal carotid artery stenosis, bilateral  I65.23 433.10     433.30   3. Bilateral carotid artery stenosis  I65.23 433.10     433.30     Patient Active Problem List   Diagnosis   • Visual disturbance   • Tobacco abuse   • Chronic pain   • Bilateral carotid artery stenosis   • COPD (chronic obstructive pulmonary disease)     History reviewed. No pertinent past medical history.  History reviewed. No pertinent surgical history.   General Information     Row Name 23 0950          Physical Therapy Time and Intention    Document Type evaluation  -CB     Mode of Treatment physical therapy  -CB     Row Name 23 0950          General Information    Patient Profile Reviewed yes  -CB     Prior Level of Function independent:;gait;transfer;bed mobility;ADL's  -CB     Existing Precautions/Restrictions no known precautions/restrictions  -CB     Barriers to Rehab none identified  -CB     Row Name 23 0950          Living Environment    People in Home spouse  -CB     Row Name 23 0950          Home Main Entrance    Number of Stairs, Main Entrance three  -CB     Row Name 23 0950          Cognition    Orientation Status (Cognition) oriented x 4  -CB           User Key  (r) = Recorded By, (t) = Taken By, (c) = Cosigned By    Initials Name Provider Type    CB Susan Miranda PT Physical Therapist               Mobility     Row Name 23 0960          Bed Mobility    Bed Mobility supine-sit  -CB     Supine-Sit [place] (Bed Mobility) independent  -CB     Row Name 23 0951          Sit-Stand Transfer    Sit-Stand [place] (Transfers) modified independence  -CB     Row Name 23 0951          Gait/Stairs (Locomotion)    [place] Level (Gait) modified independence  -CB      Distance in Feet (Gait) 400ft and 10ft  -CB     Comment, (Gait/Stairs) pt able to safely ambulate with IV pole- it is safe for pt to ambulate with IV pole ind  -CB           User Key  (r) = Recorded By, (t) = Taken By, (c) = Cosigned By    Initials Name Provider Type    CB Susan Miranda, PT Physical Therapist               Obj/Interventions     Row Name 04/27/23 0952          Range of Motion Comprehensive    General Range of Motion bilateral lower extremity ROM WFL  -CB     Row Name 04/27/23 0952          Strength Comprehensive (MMT)    General Manual Muscle Testing (MMT) Assessment no strength deficits identified  -CB     Comment, General Manual Muscle Testing (MMT) Assessment 5/5 LE MMT  -CB     Row Name 04/27/23 0952          Balance    Balance Assessment standing static balance;standing dynamic balance  -CB     Static Standing Balance modified independence  -CB     Dynamic Standing Balance modified independence  -CB     Position/Device Used, Standing Balance unsupported  -CB     Row Name 04/27/23 0952          Sensory Assessment (Somatosensory)    Sensory Assessment (Somatosensory) sensation intact  -CB           User Key  (r) = Recorded By, (t) = Taken By, (c) = Cosigned By    Initials Name Provider Type    CB Susan Miranda, PT Physical Therapist               Goals/Plan    No documentation.                Clinical Impression     Row Name 04/27/23 0952          Pain    Pretreatment Pain Rating 0/10 - no pain  -CB     Row Name 04/27/23 0952          Plan of Care Review    Plan of Care Reviewed With patient  -CB     Outcome Evaluation Patient is a 61 yo male who presented wtih loss of vision from L eye for 1 week. He lives with his spouse and is ind at baseline without use of AD. He is ind/Darrin for all mobility today and ambulated 400ft. No further acute PT needs.  -CB     Row Name 04/27/23 0952          Therapy Assessment/Plan (PT)    Therapy Frequency (PT) evaluation only  -CB     Row Name 04/27/23 0952           Positioning and Restraints    Pre-Treatment Position in bed  -CB     Post Treatment Position bed  -CB     In Bed sitting EOB;notified nsg  -CB           User Key  (r) = Recorded By, (t) = Taken By, (c) = Cosigned By    Initials Name Provider Type    Susan Zamora, PT Physical Therapist               Outcome Measures     Row Name 04/27/23 0954 04/27/23 0830       How much help from another person do you currently need...    Turning from your back to your side while in flat bed without using bedrails? 4  -CB 4  -TF    Moving from lying on back to sitting on the side of a flat bed without bedrails? 4  -CB 4  -TF    Moving to and from a bed to a chair (including a wheelchair)? 4  -CB 4  -TF    Standing up from a chair using your arms (e.g., wheelchair, bedside chair)? 4  -CB 4  -TF    Climbing 3-5 steps with a railing? 4  -CB 4  -TF    To walk in hospital room? 4  -CB 4  -TF    AM-PAC 6 Clicks Score (PT) 24  -CB 24  -TF    Highest level of mobility 8 --> Walked 250 feet or more  -CB 8 --> Walked 250 feet or more  -TF    Row Name 04/27/23 0954          Functional Assessment    Outcome Measure Options 5 Meter Walk;AM-PAC 6 Clicks Basic Mobility (PT)  -CB           User Key  (r) = Recorded By, (t) = Taken By, (c) = Cosigned By    Initials Name Provider Type    TF Meaghan Conteh RN Registered Nurse    Susan Zamora, PT Physical Therapist                               PT Recommendation and Plan     Plan of Care Reviewed With: patient  Outcome Evaluation: Patient is a 63 yo male who presented wtih loss of vision from L eye for 1 week. He lives with his spouse and is ind at baseline without use of AD. He is ind/Darrin for all mobility today and ambulated 400ft. No further acute PT needs.     Time Calculation:    PT Charges     Row Name 04/27/23 0954             Time Calculation    Start Time 0800  -CB      Stop Time 0815  -CB      Time Calculation (min) 15 min  -CB      PT Received On 04/27/23  -CB         Time  Calculation- PT    Total Timed Code Minutes- PT 8 minute(s)  -CB         Timed Charges    66175 - PT Therapeutic Activity Minutes 8  -CB         Total Minutes    Timed Charges Total Minutes 8  -CB       Total Minutes 8  -CB            User Key  (r) = Recorded By, (t) = Taken By, (c) = Cosigned By    Initials Name Provider Type    CB Susan Miranda, PT Physical Therapist              Therapy Charges for Today     Code Description Service Date Service Provider Modifiers Qty    33308130173 HC PT THERAPEUTIC ACT EA 15 MIN 4/27/2023 Susan Miranda, PT GP 1    21298365811 HC PT EVAL MOD COMPLEXITY 2 4/27/2023 Susan Miranda, PT GP 1          PT G-Codes  Outcome Measure Options: 5 Meter Walk, AM-PAC 6 Clicks Basic Mobility (PT)  AM-PAC 6 Clicks Score (PT): 24  PT Discharge Summary  Anticipated Discharge Disposition (PT): home    Susan Miranda PT  4/27/2023

## 2023-04-27 NOTE — THERAPY EVALUATION
Patient Name: Rito Arnold  : 1960    MRN: [id number]                              Today's Date: 2023       Admit Date: 2023    Visit Dx:     ICD-10-CM ICD-9-CM   1. Changes in vision  H53.9 368.9   2. Internal carotid artery stenosis, bilateral  I65.23 433.10     433.30   3. Bilateral carotid artery stenosis  I65.23 433.10     433.30     Patient Active Problem List   Diagnosis   • Visual disturbance   • Tobacco abuse   • Chronic pain   • Bilateral carotid artery stenosis   • COPD (chronic obstructive pulmonary disease)     History reviewed. No pertinent past medical history.  History reviewed. No pertinent surgical history.   General Information     Row Name 23 0958          OT Time and Intention    Document Type evaluation  -MW     Mode of Treatment occupational therapy  -MW     Row Name 23 0958          General Information    Patient Profile Reviewed yes  -MW     Prior Level of Function independent:  -MW     Existing Precautions/Restrictions no known precautions/restrictions  -MW     Barriers to Rehab none identified  -MW     Row Name 23 0958          Living Environment    People in Home spouse  -MW     Row Name 23 0958          Home Main Entrance    Number of Stairs, Main Entrance three  -MW     Row Name 23 0958          Cognition    Orientation Status (Cognition) oriented x 4  -MW           User Key  (r) = Recorded By, (t) = Taken By, (c) = Cosigned By    Initials Name Provider Type    Tania Lerner OT Occupational Therapist                 Mobility/ADL's     Row Name 23 0958          Bed Mobility    Bed Mobility supine-sit  -MW     Supine-Sit [place] (Bed Mobility) independent  -     Row Name 2358          Transfers    Transfers sit-stand transfer;stand-sit transfer;toilet transfer  -     Row Name 2358          Sit-Stand Transfer    Sit-Stand [place] (Transfers) modified independence  -     Row Name 23 0958           Stand-Sit Transfer    Stand-Sit [place] (Transfers) modified independence  -[facility] Name 04/27/23 0958          Toilet Transfer    [place] Level (Toilet Transfer) modified independence  -     Comment, (Toilet Transfer) standing at commode to urinate  -[facility] Name 04/27/23 0958          Functional Mobility    Functional Mobility- Ind. Level independent  -     Functional Mobility- Comment able to manage IV pole (I)  -MW     Row Name 04/27/23 0958          Activities of Daily Living    BADL Assessment/Intervention toileting;feeding  -MW     Row Name 04/27/23 0958          Toileting Assessment/Training    Comment, (Toileting) (I0 with urnate at commode and hygiene  -[facility] Name 04/27/23 0958          Self-Feeding Assessment/Training    [place] Level (Feeding) feeding skills;independent  -           User Key  (r) = Recorded By, (t) = Taken By, (c) = Cosigned By    Initials Name Provider Type     Tania Jefferson OT Occupational Therapist               Obj/Interventions     [facility] Name 04/27/23 0959          Sensory Assessment (Somatosensory)    Sensory Assessment (Somatosensory) UE sensation intact  -MW     Row Name 04/27/23 0959          Vision Assessment/Intervention    Vision Assessment Comment L eye deficits, peripheral  -MW     Row Name 04/27/23 0959          Range of Motion Comprehensive    General Range of Motion bilateral upper extremity ROM WNL  -MW     Row Name 04/27/23 0959          Strength Comprehensive (MMT)    General Manual Muscle Testing (MMT) Assessment no strength deficits identified  -MW     Row Name 04/27/23 0959          Balance    Balance Assessment sitting static balance;sitting dynamic balance;sit to stand dynamic balance;standing static balance;standing dynamic balance  -     Static Sitting Balance independent  -     Dynamic Sitting Balance independent  -     Position, Sitting Balance unsupported  -     Sit to Stand Dynamic Balance modified  independence  -MW     Static Standing Balance modified independence  -MW     Dynamic Standing Balance modified independence  -MW     Position/Device Used, Standing Balance unsupported  -MW     Balance Interventions sitting;standing;sit to stand;supported;static;dynamic;minimal challenge;occupation based/functional task  -MW     Comment, Balance (I), no balance or safety deficits noted  -MW           User Key  (r) = Recorded By, (t) = Taken By, (c) = Cosigned By    Initials Name Provider Type    Tania Lerner OT Occupational Therapist               Goals/Plan     Row Name 04/27/23 1004          Transfer Goal 1 (OT)    Activity/Assistive Device (Transfer Goal 1, OT) sit-to-stand/stand-to-sit;toilet  -MW     [place] Level/Cues Needed (Transfer Goal 1, OT) modified independence;independent  -MW     Time Frame (Transfer Goal 1, OT) short term goal (STG);1 day  -MW     Progress/Outcome (Transfer Goal 1, OT) goal met  -MW           User Key  (r) = Recorded By, (t) = Taken By, (c) = Cosigned By    Initials Name Provider Type    Tania Lerner OT Occupational Therapist               Clinical Impression     Row Name 04/27/23 1002          Pain Assessment    Pretreatment Pain Rating 0/10 - no pain  -MW     Posttreatment Pain Rating 0/10 - no pain  -MW     Row Name 04/27/23 1002          Plan of Care Review    Plan of Care Reviewed With patient  -MW     Progress improving  -MW     Outcome Evaluation Pt is a 63 yo male who presented with loss of vision in L eye for 1 week. Pt lives with spouse, (I) at baseline, no AD. Pt presents at his baseline with ADLs and mobility this date, completing toileting with mod (I), sink side, and func mob (I) with mgment of IV pole. Pt with no further acute OT needs at this time.  -MW     Row Name 04/27/23 1002          Therapy Assessment/Plan (OT)    Criteria for Skilled Therapeutic Interventions Met (OT) no problems identified which require skilled intervention  -[facility]  Name 04/27/23 1002          Therapy Plan Review/Discharge Plan (OT)    Anticipated Discharge Disposition (OT) home  -MW     Row Name 04/27/23 1002          Vital Signs    O2 Delivery Pre Treatment room air  -MW     Pre Patient Position Supine  -MW     Intra Patient Position Standing  -MW     Post Patient Position Sitting  -MW     Row Name 04/27/23 1002          Positioning and Restraints    Pre-Treatment Position in bed  -MW     Post Treatment Position bed  -MW     In Bed sitting EOB;with nsg  -MW           User Key  (r) = Recorded By, (t) = Taken By, (c) = Cosigned By    Initials Name Provider Type    Tania Lerner, SYBIL Occupational Therapist               Outcome Measures     Row Name 04/27/23 1004          How much help from another is currently needed...    Putting on and taking off regular lower body clothing? 4  -MW     Bathing (including washing, rinsing, and drying) 4  -MW     Toileting (which includes using toilet bed pan or urinal) 4  -MW     Putting on and taking off regular upper body clothing 4  -MW     Taking care of personal grooming (such as brushing teeth) 4  -MW     Eating meals 4  -MW     AM-PAC 6 Clicks Score (OT) 24  -MW     Row Name 04/27/23 0954 04/27/23 0830       How much help from another person do you currently need...    Turning from your back to your side while in flat bed without using bedrails? 4  -CB 4  -TF    Moving from lying on back to sitting on the side of a flat bed without bedrails? 4  -CB 4  -TF    Moving to and from a bed to a chair (including a wheelchair)? 4  -CB 4  -TF    Standing up from a chair using your arms (e.g., wheelchair, bedside chair)? 4  -CB 4  -TF    Climbing 3-5 steps with a railing? 4  -CB 4  -TF    To walk in hospital room? 4  -CB 4  -TF    AM-PAC 6 Clicks Score (PT) 24  -CB 24  -TF    Highest level of mobility 8 --> Walked 250 feet or more  -CB 8 --> Walked 250 feet or more  -TF    Row Name 04/27/23 1004          Modified [place] Scale    Modified  [place] Scale 1 - No significant disability despite symptoms.  Able to carry out all usual duties and activities.  -     Row Name 04/27/23 1004 04/27/23 0954       Functional Assessment    Outcome Measure Options AM-PAC 6 Clicks Daily Activity (OT);Modified [place]  -MW 5 Meter Walk;AM-PAC 6 Clicks Basic Mobility (PT)  -CB          User Key  (r) = Recorded By, (t) = Taken By, (c) = Cosigned By    Initials Name Provider Type    Meaghan Arias, RN Registered Nurse    Susan Zamora, PT Physical Therapist     Tania Jefferson OT Occupational Therapist                Occupational Therapy Education     Title: PT OT SLP Therapies (Done)     Topic: Occupational Therapy (Done)     Point: ADL training (Done)     Description:   Instruct learner(s) on proper safety adaptation and remediation techniques during self care or transfers.   Instruct in proper use of assistive devices.              Learning Progress Summary           Patient Acceptance, E, VU by  at 4/27/2023 1004    Comment: role of OT                   Point: Precautions (Done)     Description:   Instruct learner(s) on prescribed precautions during self-care and functional transfers.              Learning Progress Summary           Patient Acceptance, E, VU by  at 4/27/2023 1004    Comment: role of OT                   Point: Body mechanics (Done)     Description:   Instruct learner(s) on proper positioning and spine alignment during self-care, functional mobility activities and/or exercises.              Learning Progress Summary           Patient Acceptance, E, VU by  at 4/27/2023 1004    Comment: role of OT                               User Key     Initials Effective Dates Name Provider Type Discipline     08/20/21 -  Tania Jefferson OT Occupational Therapist OT              OT Recommendation and Plan     Plan of Care Review  Plan of Care Reviewed With: patient  Progress: improving  Outcome Evaluation: Pt is a 61 yo male who presented  with loss of vision in L eye for 1 week. Pt lives with spouse, (I) at baseline, no AD. Pt presents at his baseline with ADLs and mobility this date, completing toileting with mod (I), sink side, and func mob (I) with mgment of IV pole. Pt with no further acute OT needs at this time.     Time Calculation:    Time Calculation- OT     Row Name 04/27/23 1004             Time Calculation- OT    OT Start Time 0805  -MW      OT Stop Time 0815  -MW      OT Time Calculation (min) 10 min  -MW      OT Received On 04/27/23  -MW         Untimed Charges    OT Eval/Re-eval Minutes 10  -MW         Total Minutes    Untimed Charges Total Minutes 10  -MW       Total Minutes 10  -MW            User Key  (r) = Recorded By, (t) = Taken By, (c) = Cosigned By    Initials Name Provider Type    Tania Lerner OT Occupational Therapist              Therapy Charges for Today     Code Description Service Date Service Provider Modifiers Qty    [id number] HC OT EVAL LOW COMPLEXITY 2 4/27/2023 Tania Jefferson OT GO 1               Tania Jefferson OT  4/27/2023

## 2023-04-27 NOTE — ED PROVIDER NOTES
" EMERGENCY DEPARTMENT ENCOUNTER    Room Number:  36/36  Date seen:  4/26/2023  PCP: Provider, No Known  Historian(s): Patient and his wife      HPI:  Chief Complaint: Left eye vision changes  A complete HPI/ROS/PMH/PSH/SH/FH are unobtainable / limited due to: N/A  Context: Rito Arnold is a 62 y.o. male who presents to the ED after seeing a retinal ophthalmology specialist this afternoon for evaluation of cloudy vision deficit changes in his left eye.  Apparently 1 and half weeks ago, the patient had a new onset cloudy disturbance in the right inferior aspect field of vision affecting the left eye only.  This vision change has persisted throughout this time.  He does occasionally get headaches as well.  The patient has not had any change in speech.  He has not noticed any weakness affecting his arms or legs nor does he have any change in sensation to the face or arms or legs.  Apparently when he saw the retinal ophthalmology specialist today, there was concern for left retinal artery occlusion.  The specialist explained to the patient and his wife that he had a essentially had a \"stroke\" affecting his left eye and they strongly urged him to come to the emergency room for further testing tonight, especially because patient has not established with a PCP and has no means for prompt follow-up care in the outpatient setting.        PAST MEDICAL HISTORY  Active Ambulatory Problems     Diagnosis Date Noted   • No Active Ambulatory Problems     Resolved Ambulatory Problems     Diagnosis Date Noted   • No Resolved Ambulatory Problems     No Additional Past Medical History         PAST SURGICAL HISTORY  No past surgical history on file.      FAMILY HISTORY  No family history on file.      SOCIAL HISTORY  Social History     Socioeconomic History   • Marital status:          ALLERGIES  Patient has no known allergies.        REVIEW OF SYSTEMS  Review of Systems   Constitutional: Negative for activity change and fever. "   Eyes: Positive for visual disturbance. Negative for pain.   Respiratory: Negative for cough and shortness of breath.    Cardiovascular: Negative for chest pain.   Gastrointestinal: Negative for abdominal pain.   Genitourinary: Negative for dysuria.   Skin: Negative for color change.   Neurological: Positive for headaches. Negative for syncope.   All other systems reviewed and are negative.       PHYSICAL EXAM  ED Triage Vitals   Temp Heart Rate Resp BP SpO2   04/26/23 1849 04/26/23 1849 04/26/23 1849 04/26/23 1852 04/26/23 1849   98.3 °F (36.8 °C) (!) 121 18 156/93 98 %      Temp src Heart Rate Source Patient Position BP Location FiO2 (%)   -- -- -- -- --              Physical Exam      GENERAL: Pleasant gentleman, calm, no diaphoresis, no acute distress  HENT: nares patent, normocephalic and atraumatic  EYES: no scleral icterus, EOMI, normal conjunctiva, PERRL  CV: regular rhythm, normal rate, normal distal pulses  RESPIRATORY: normal effort, no stridor, lungs clear to auscultation bilaterally  ABDOMEN: soft, nontender in all 4 quadrants  MUSCULOSKELETAL: no deformity, no asymmetry  NEURO: alert, moves all extremities, follows commands, speech is clear, no facial droop, normal motor strength to all 4 extremities noted.  No sensory deficit to the extremities observed.  PSYCH:  calm, cooperative  SKIN: warm, dry    Vital signs and nursing notes reviewed.        LAB RESULTS  Recent Results (from the past 24 hour(s))   ECG 12 Lead Other; vision changes    Collection Time: 04/26/23  8:22 PM   Result Value Ref Range    QT Interval 370 ms   Comprehensive Metabolic Panel    Collection Time: 04/26/23  8:23 PM    Specimen: Blood   Result Value Ref Range    Glucose 122 (H) 65 - 99 mg/dL    BUN 14 8 - 23 mg/dL    Creatinine 0.93 0.76 - 1.27 mg/dL    Sodium 143 136 - 145 mmol/L    Potassium 4.0 3.5 - 5.2 mmol/L    Chloride 106 98 - 107 mmol/L    CO2 25.6 22.0 - 29.0 mmol/L    Calcium 9.4 8.6 - 10.5 mg/dL    Total Protein 6.7  6.0 - 8.5 g/dL    Albumin 4.1 3.5 - 5.2 g/dL    ALT (SGPT) 8 1 - 41 U/L    AST (SGOT) 12 1 - 40 U/L    Alkaline Phosphatase 67 39 - 117 U/L    Total Bilirubin 0.3 0.0 - 1.2 mg/dL    Globulin 2.6 gm/dL    A/G Ratio 1.6 g/dL    BUN/Creatinine Ratio 15.1 7.0 - 25.0    Anion Gap 11.4 5.0 - 15.0 mmol/L    eGFR 91.7 >60.0 mL/min/1.73   Protime-INR    Collection Time: 04/26/23  8:23 PM    Specimen: Blood   Result Value Ref Range    Protime 13.7 11.7 - 14.2 Seconds    INR 1.04 0.90 - 1.10   aPTT    Collection Time: 04/26/23  8:23 PM    Specimen: Blood   Result Value Ref Range    PTT 26.8 22.7 - 35.4 seconds   CBC Auto Differential    Collection Time: 04/26/23  8:23 PM    Specimen: Blood   Result Value Ref Range    WBC 9.16 3.40 - 10.80 10*3/mm3    RBC 3.93 (L) 4.14 - 5.80 10*6/mm3    Hemoglobin 11.6 (L) 13.0 - 17.7 g/dL    Hematocrit 33.9 (L) 37.5 - 51.0 %    MCV 86.3 79.0 - 97.0 fL    MCH 29.5 26.6 - 33.0 pg    MCHC 34.2 31.5 - 35.7 g/dL    RDW 14.5 12.3 - 15.4 %    RDW-SD 46.0 37.0 - 54.0 fl    MPV 8.5 6.0 - 12.0 fL    Platelets 260 140 - 450 10*3/mm3    Neutrophil % 68.6 42.7 - 76.0 %    Lymphocyte % 22.1 19.6 - 45.3 %    Monocyte % 7.2 5.0 - 12.0 %    Eosinophil % 1.5 0.3 - 6.2 %    Basophil % 0.4 0.0 - 1.5 %    Immature Grans % 0.2 0.0 - 0.5 %    Neutrophils, Absolute 6.28 1.70 - 7.00 10*3/mm3    Lymphocytes, Absolute 2.02 0.70 - 3.10 10*3/mm3    Monocytes, Absolute 0.66 0.10 - 0.90 10*3/mm3    Eosinophils, Absolute 0.14 0.00 - 0.40 10*3/mm3    Basophils, Absolute 0.04 0.00 - 0.20 10*3/mm3    Immature Grans, Absolute 0.02 0.00 - 0.05 10*3/mm3    nRBC 0.0 0.0 - 0.2 /100 WBC   Hemoglobin A1c    Collection Time: 04/26/23  8:23 PM    Specimen: Blood   Result Value Ref Range    Hemoglobin A1C 5.50 4.80 - 5.60 %   TSH    Collection Time: 04/26/23  8:23 PM    Specimen: Blood   Result Value Ref Range    TSH 1.900 0.270 - 4.200 uIU/mL   T4, Free    Collection Time: 04/26/23  8:23 PM    Specimen: Blood   Result Value Ref Range     Free T4 1.26 0.93 - 1.70 ng/dL       Ordered the above labs and reviewed the results.        RADIOLOGY  CT Angiogram Neck    Result Date: 4/26/2023  Patient: YANELY TOBAR  Time Out: 23:03 Exam(s): CTA NECK EXAM:   CT Neck With Intravenous Contrast CLINICAL HISTORY:    Reason for exam: Left eye vision changes. TECHNIQUE:   Routine carotid CT protocol was performed with intravenous contrast.  NASCET criteria using the distal ICAs for comparison were used for evaluation of stenoses.  CTDI is 115 mGy and DLP is 2725 mGy-cm.  This CT exam was performed according to the principle of ALARA (As Low As Reasonably Achievable) by using one or more of the following dose reduction techniques: automated exposure control, adjustment of the mA and or kV according to patient size, and or use of iterative reconstruction technique. COMPARISON:   None. FINDINGS:  VASCULATURE:   Right common carotid artery:  Unremarkable.  No occlusion or significant stenosis.  No dissection.   Right internal carotid artery: There is a 70% stenosis of the proximal right ICA.  No dissection.   Right external carotid artery:  Unremarkable.  No occlusion.   Right vertebral artery:  Unremarkable.  No occlusion or significant stenosis.  No dissection.   Left common carotid artery: There is a 70% stenosis of the proximal left ICA.  No dissection.   Left external carotid artery:  Unremarkable.  No occlusion.   Left vertebral artery:  Unremarkable.  No occlusion or significant stenosis.  No dissection.  NECK:   Bones joints: Moderate to advanced disc degeneration at C3-4, C4-5, C5- 6 and C6-7 with a mild spinal canal stenosis at C6-7.  Remote fracture deformities of T4 and T5.   Soft tissues: Prominent mediastinal and hilar lymph nodes.  Prominent cervical lymph nodes.   Lung apices: Findings concerning for bronchitis with pneumonitis and chronic interstitial scarring with moderate centrilobular emphysematous changes.  CAROTID STENOSIS REFERENCE USING  NASCET CRITERIA:   % ICA stenosis = (1 - narrowest ICA diameter diameter of distal cervical ICA) x 100.   Mild - <50% stenosis.   Moderate - 50-69% stenosis.   Severe - 70-94% stenosis.   Near occlusion - 95-99% stenosis.   Occluded - 100% stenosis. IMPRESSION:     There is a 70% stenosis of the proximal right and left ICA.     Electronically signed by Stephanie Guzman MD on 04-26-23 at 2303    CT Angiogram Head    Result Date: 4/26/2023  Patient: YANELY TOBAR  Time Out: 22:55 Exam(s): CTA HEAD EXAM:   CT Head With Intravenous Contrast CLINICAL HISTORY:    Reason for exam: Left eye vision changes. TECHNIQUE: AI analysis of LVO was utilized   Axial computed tomographic images of the head with intravenous contrast.   CTDI is 115 mGy and DLP is 2725 mGy-cm.  This CT exam was performed according to the principle of ALARA (As Low As Reasonably Achievable) by using one or more of the following dose reduction techniques: automated exposure control, adjustment of the mA and or kV according to patient size, and or use of iterative reconstruction technique. COMPARISON:   No relevant prior studies available. FINDINGS:   Right internal carotid artery:  No acute findings.  Intracranial segment is patent with no significant stenosis.  No aneurysm.   Right anterior cerebral artery:  Unremarkable.  No occlusion or significant stenosis.  No aneurysm.   Right middle cerebral artery:  Unremarkable.  No occlusion or significant stenosis.  No aneurysm.   Right posterior cerebral artery:  Unremarkable.  No occlusion or significant stenosis.  No aneurysm.   Right vertebral artery:  Unremarkable as visualized.   Left internal carotid artery:  No acute findings.  Intracranial segment is patent with no significant stenosis.  No aneurysm.   Left anterior cerebral artery:  Unremarkable.  No occlusion or significant stenosis.  No aneurysm.   Left middle cerebral artery:  Unremarkable.  No occlusion or significant stenosis.  No aneurysm.   Left  posterior cerebral artery:  Unremarkable.  No occlusion or significant stenosis.  No aneurysm.   Left vertebral artery:  Unremarkable as visualized.   Basilar artery:  Unremarkable.  No occlusion or significant stenosis.  No aneurysm. IMPRESSION:     Negative CT angiogram of the head. 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     Electronically signed by Stephanie Guzman MD on 04-26-23 at 2255      Ordered the above noted radiological studies. Reviewed by me in PACS.          PROCEDURES  Procedures    EKG           EKG time/Interp time: 2022/2023  Rhythm/Rate: Sinus rhythm, 83 bpm  P waves and [place]: Present, 182 ms  QRS, axis: 83 ms, normal axis  ST and T waves: No ST segment elevations are notable.    Independently interpreted by me contemporaneously with treatment      MEDICATIONS GIVEN IN ER  Medications   sodium chloride 0.9 % flush 10 mL (has no administration in time range)   iopamidol (ISOVUE-370) 76 % injection 95 mL (95 mL Intravenous Given by Other 4/26/23 2121)   aspirin tablet 325 mg (325 mg Oral Given 4/26/23 2332)           MEDICAL DECISION MAKING, PROGRESS, and CONSULTS    All labs have been independently reviewed by me.  All radiology studies have been reviewed by me and I have also reviewed the radiology report.   EKG's independently viewed and interpreted by me.  Discussion below represents my analysis of pertinent findings related to patient's condition, differential diagnosis, treatment plan and final disposition.      Additional sources:  - Discussed/ obtained information from independent historians: Patient's wife provides much of the history at the bedside.    - External (non-ED) record review: There are no recent records on file in patient's medical chart for review.    - Chronic or social conditions impacting care: Patient is a smoker - he tells me that he decided to quit today.  He is not established with a local PCP    - Shared  decision making: I discussed disposition recommendations with the patient.  I explained that my recommendation would be to observe him in the hospital for further testing and consultation with neurology and/or vascular surgery specialists in the morning as we complete his work-up with additional tests.  Alternatively, I explained that if he discharges home then he would need to establish a new PCP and try to get the remaining test such as carotid ultrasound and echocardiogram completed as soon as possible.  Ultimately, he agreed to stay in the hospital tonight for expediting these tests and consultation processes      Orders placed during this visit:  Orders Placed This Encounter   Procedures   • CT Angiogram Head   • CT Angiogram Neck   • Comprehensive Metabolic Panel   • Protime-INR   • aPTT   • CBC Auto Differential   • Hemoglobin A1c   • TSH   • T4, Free   • Monitor Blood Pressure   • Cardiac Monitoring   • LHA (on-call MD unless specified) Details   • ECG 12 Lead Other; vision changes   • Insert Peripheral IV   • Initiate Observation Status   • CBC & Differential           Differential diagnosis includes but is not limited to:    Acute stroke, central retinal artery occlusion, vitreous hemorrhage, retinal detachment, hypertensive retinopathy      Independent interpretation of labs, radiology studies, and discussions with consultants:  ED Course as of 04/26/23 2354 Wed Apr 26, 2023   2343 I independently interpreted the CT head without contrast study and my findings are: No intracranial hemorrhage, no midline shift or mass effect. [JOAO]   2344 I reviewed the CT angiogram studies of the head and neck.  He does have bilateral stenosis of the ICAs at 70%. [JOAO]   2344 Patient clearly has risk factors for cardiovascular disease is a smoker and not established with any PCP for routine healthcare checkups.  I explained to him my recommendation to stay in the hospital tonight for further testing and management of his  symptoms.  Ultimately after some conversation between him and his wife, he agreed to do so.  Paging [facility] at this time. [JOAO]   4082 I just discussed with Jayson from [facility] about this patient.  She agrees to accept him to the hospitalist service on behalf of Dr. Howard [JOAO]      ED Course User Index  [JOAO] Virgil Fierro MD         DIAGNOSIS  Final diagnoses:   Changes in vision   Internal carotid artery stenosis, bilateral         DISPOSITION  Observation to [facility]        Latest Documented Vital Signs:  As of 23:54 EDT  BP- 129/76 HR- 67 Temp- 98.3 °F (36.8 °C) O2 sat- 93%              --    Please note that portions of this were completed with a voice recognition program.       Note Disclaimer: At [facility], we believe that sharing information builds trust and better relationships. You are receiving this note because you are receiving care at [facility] or recently visited. It is possible you will see health information before a provider has talked with you about it. This kind of information can be easy to misunderstand. To help you fully understand what it means for your health, we urge you to discuss this note with your provider.           Virgil Fierro MD  04/26/23 2738

## 2023-04-27 NOTE — PLAN OF CARE
Goal Outcome Evaluation:  Plan of Care Reviewed With: patient        Progress: improving  Outcome Evaluation: Pt is a 63 yo male who presented with loss of vision in L eye for 1 week. Pt lives with spouse, (I) at baseline, no AD. Pt presents at his baseline with ADLs and mobility this date, completing toileting with mod (I), sink side, and func mob (I) with mgment of IV pole. Pt with no further acute OT needs at this time.

## 2023-04-27 NOTE — CONSULTS
Name: Rito Arnold ADMIT: 2023   : 1960  PCP: Provider, No Known    MRN: [id number] LOS: 0 days   AGE/SEX: 62 y.o. male  ROOM: [address]    Patient Care Team:  Provider, No Known as PCP - General  Chief Complaint   Patient presents with   • Eye Problem     CC: Carotid stenosis    History of Present Illness  62 y.o. male who does not follow regularly with a primary care physician, admitted to hospital after being sent by an ophthalmologist with partial left retinal stroke.  Patient without previous symptomatic cerebrovascular disease including TIA, amaurosis fugax and stroke.  Patient noted the appearance of clouded vision or visual field defect in the right lower visual field of his left eye about a week and a half ago.  Onset was sudden and on precipitated.  Course nonprogressive, without improvement.  No other associated symptoms.  He indicates that he was felt to have a retinal stroke, but of course no records were forwarded from the referring ophthalmologist office.  Since admit, EKG (normal sinus rhythm, no ischemic changes) and a CT angiogram (bilateral ICA stenosis around 70%) were obtained.  No known cardiopulmonary disease, though he is a 1.5 pack/day cigarette smoker, who says he is going to quit.  No diabetes.  No lower extremity claudications.  Has chronic pain related to lower back, possible medical long-term.    Review of Systems   Eyes: Positive for visual disturbance.   Musculoskeletal: Positive for back pain.   All other systems reviewed and are negative.    History reviewed. No pertinent past medical history.  History reviewed. No pertinent surgical history.  History reviewed. No pertinent family history.    Social History     Tobacco Use   • Smoking status: Every Day     Packs/day: 1.50     Years: 20.00     Pack years: 30.00     Types: Cigarettes     Passive exposure: Current   • Smokeless tobacco: Never   Vaping Use   • Vaping Use: Never used   Substance Use Topics   •  "Alcohol use: Defer   • Drug use: Never     Medications Prior to Admission   Medication Sig Dispense Refill Last Dose   • gabapentin (NEURONTIN) 600 MG tablet Daily.   Past Week   • HYDROcodone-acetaminophen (NORCO)  MG per tablet hydrocodone 10 mg-acetaminophen 325 mg tablet   4/26/2023     aspirin, 325 mg, Oral, Daily   Or  aspirin, 300 mg, Rectal, Daily  atorvastatin, 80 mg, Oral, Nightly  gabapentin, 600 mg, Oral, Daily    Patient has no known allergies.    Vital Signs and Labs:  Vital Signs   Patient Vitals for the past 24 hrs:   BP Temp Temp src Pulse Resp SpO2 Height Weight   04/27/23 0445 145/94 97.4 °F (36.3 °C) Oral 65 18 97 % -- --   04/27/23 0129 -- -- -- -- -- -- -- 86.8 kg (191 lb 5.8 oz)   04/27/23 0111 156/94 97.9 °F (36.6 °C) Oral 71 18 96 % -- --   04/27/23 0001 166/97 -- -- 71 -- 94 % -- --   04/26/23 2233 -- -- -- 67 -- 93 % -- --   04/26/23 2231 129/76 -- -- 71 -- 93 % -- --   04/26/23 2201 134/85 -- -- 79 -- 92 % -- --   04/26/23 2101 144/96 -- -- 73 -- 93 % -- --   04/26/23 1931 156/96 -- -- 101 -- 95 % -- --   04/26/23 1852 156/93 -- -- -- -- -- 177.8 cm (70\") 88.5 kg (195 lb)   04/26/23 1849 -- 98.3 °F (36.8 °C) -- (!) 121 18 98 % -- --     BMI:  Body mass index is 27.46 kg/m².    Physical Exam:  Physical Exam  Vitals reviewed.   Constitutional:       General: He is not in acute distress.     Appearance: Normal appearance. He is normal weight. He is not ill-appearing, toxic-appearing or diaphoretic.   HENT:      Head: Normocephalic and atraumatic.      Right Ear: External ear normal.      Left Ear: External ear normal.      Nose: Nose normal.      Mouth/Throat:      Mouth: Mucous membranes are dry.   Eyes:      General: No scleral icterus.     Extraocular Movements: Extraocular movements intact.      Conjunctiva/sclera: Conjunctivae normal.      Pupils: Pupils are equal, round, and reactive to light.      Comments: Patient reports mild left eye visual field defect in medial lower " quadrant.  Still can read and see with left eye.   Neck:      Vascular: No carotid bruit.   Cardiovascular:      Rate and Rhythm: Normal rate and regular rhythm.      Pulses: Normal pulses.      Heart sounds: Normal heart sounds.      Comments: Easily palpable radial and pedal artery pulses bilaterally.  No lower extremity edema.  Pulmonary:      Effort: Pulmonary effort is normal. No respiratory distress.      Breath sounds: Normal breath sounds. No stridor.   Abdominal:      Palpations: Abdomen is soft.      Tenderness: There is no abdominal tenderness. There is no guarding.   Musculoskeletal:         General: Normal range of motion.      Cervical back: Normal range of motion and neck supple. No rigidity or tenderness.   Skin:     General: Skin is warm and dry.      Capillary Refill: Capillary refill takes less than 2 seconds.      Coloration: Skin is not jaundiced or pale.   Neurological:      General: No focal deficit present.      Mental Status: He is alert and oriented to person, place, and time.      Cranial Nerves: No cranial nerve deficit.   Psychiatric:         Mood and Affect: Mood normal.         Behavior: Behavior normal.         Thought Content: Thought content normal.         Judgment: Judgment normal.     CBC    Results from last 7 days   Lab Units 04/26/23 2023   WBC 10*3/mm3 9.16   HEMOGLOBIN g/dL 11.6*   PLATELETS 10*3/mm3 260     BMP   Results from last 7 days   Lab Units 04/26/23 2023   SODIUM mmol/L 143   POTASSIUM mmol/L 4.0   CHLORIDE mmol/L 106   CO2 mmol/L 25.6   BUN mg/dL 14   CREATININE mg/dL 0.93   GLUCOSE mg/dL 122*     Cr Clearance Estimated Creatinine Clearance: 98.5 mL/min (by C-G formula based on SCr of 0.93 mg/dL).    EKG personally reviewed.  Sinus rhythm, no ischemic changes.  MRI of brain obtained.  No suspicious areas for stroke by my interpretation, but formal report not available.  CT angiogram personally reviewed.  No arch disease.  Proximal left subclavian artery plaque  without stenosis.  Significant left ICA stenosis associated with soft plaque, with dilated carotid bulb measuring 10 mm.  Right ICA stenosis described as 70%, but may not be as severe.  Left carotid bulb not dilated.  Lungs with impressive emphysema.    Active Hospital Problems    Diagnosis  POA   • **Visual disturbance [H53.9]  Yes   • Tobacco abuse [Z72.0]  Yes   • Chronic pain [G89.29]  Yes   • Bilateral carotid artery stenosis [I65.23]  Yes   • COPD (chronic obstructive pulmonary disease) [J44.9]  Yes      Resolved Hospital Problems   No resolved problems to display.     Problem Points:  3:  Patient has a new problem, with no additional work-up planned (max of 1)  Total problem points:3    Data Points:  1:  I have reviewed or order clinical lab test  1:  I have reviewed or order radiology test (except heart catheterization or echo)  2:  I have personally and independently review of image, tracing, or specimen  1:  I have personally decided to obtain of records  Total data points:4 or more    Risk Points:  High:  Abrupt change in neuorlogic exam    MDM requires 2/3 (Problem points, Data points and Risk)  MDM Prob point Data point Risk   SF 1 1 Minimal   Low 2 2 Low   Mod 3 3 Moderate   High 4 4 High     Code requires 3/3 (MDM, History and Exam)  Code MDM History Exam Time   55608 SF/Low Detailed Detailed 30   80733 Mod Comprehensive Comprehensive 50   29788 High Comprehensive Comprehensive 70     Detailed history:  4 elements HPI or status of 3 chronic problems; 2-9 system ROS  Comprehensive:  4 elements HPI or status of 3 chronic problems;  10 system ROS    Detailed Exam:  12 findings from any organ system  Comprehensive Exam:  2 findings from each of 9 systems.   70065    Assessment & Plan       Visual disturbance    Tobacco abuse    Chronic pain    Bilateral carotid artery stenosis    COPD (chronic obstructive pulmonary disease)    62 y.o. male sent to hospital with 1-1/2-week history of cloudy vision the  lower right visual field of his left eye, apparently having been identified with a retinal stroke.  Evaluation included CT angiogram, demonstrating bilateral ICA stenosis 70%.  Not previously treated with any platelet or any lipid therapy.  Patient heavy smoker, says he is going to quit, but no other cardiopulmonary disease or diabetes.  Appears to have symptomatic left ICA stenosis and is a candidate for left CEA this admit.  Has been started on aspirin and high-dose statin therapy.  Hope to arrange surgery for tomorrow.  Have discussed nature of problem with patient, and recommendations for treatment with antiplatelet and antilipid therapy and the importance of smoking cessation.  Discussed treatment of symptomatic left ICA stenosis, with possible recommendation for treatment of asymptomatic right ICA stenosis at some point in the future.  Given smoking history he has relative anemia.  Echo scheduled for today to complete cerebrovascular embolism work-up.  Do not believe he will require formal cardiac risk assessment unless something glaring is discovered by echo.  He needs primary care.    I discussed the patients findings and my recommendations with patient.    Seun Herrera MD  04/27/23  08:04 EDT    Please call my office with any question: [phone]

## 2023-04-27 NOTE — PLAN OF CARE
Goal Outcome Evaluation:  Plan of Care Reviewed With: patient           Outcome Evaluation: Patient is a 63 yo male who presented wtih loss of vision from L eye for 1 week. He lives with his spouse and is ind at baseline without use of AD. He is ind/Darrin for all mobility today and ambulated 400ft. No further acute PT needs.

## 2023-04-27 NOTE — PAYOR COMM NOTE
"Rito Arnold (62 y.o. Male)    PLEASE SEE ATTACHED FOR INPT AUTH     REF #   [id number]    PLEASE CALL REINALDO BOLIVAR RN/ DEPT @ [phone]  OR FAX  DEPARTMENT @  [phone]    THANK YOU   REINALDO BOLIVAR RN  [facility]         Date of Birth   1960    Social Security Number       Address   [address] [place] [id number]    Home Phone   [phone]    MRN   [id number]       Sikh   Orthodox    Marital Status                               Admission Date   4/26/23    Admission Type   Emergency    Admitting Provider   Latrell Moncada MD    Attending Provider   Latrell Moncada MD    Department, Room/Bed   [facility] [address] [place], 77/1       Discharge Date       Discharge Disposition       Discharge Destination                               Attending Provider: Latrell Moncada MD    Allergies: No Known Allergies    Isolation: None   Infection: None   Code Status: CPR    Ht: 177.8 cm (70\")   Wt: 86.8 kg (191 lb 5.8 oz)    Admission Cmt: None   Principal Problem: Visual disturbance [H53.9]                 Active Insurance as of 4/26/2023     Primary Coverage     Payor Plan Insurance Group Employer/Plan Group    [employer]HE[employer] BLUE CROSS ANTHEM BLUE CROSS BLUE SHIELD PPO [id number]     Payor Plan Address Payor Plan Phone Number Payor Plan Fax Number Effective Dates    [address] [phone]  2/1/2022 - None Entered    [place] [address]       Subscriber Name Subscriber Birth Date Member ID       RITO ARNOLD 1960 [id number]                 Emergency Contacts      (Rel.) Home Phone Work Phone Mobile Phone    ONEYDA ARNOLD (Spouse) -- -- [phone]            [place]: NPI [id number]  Tax ID [id number]     History & Physical      Mariangel Wolfe, APRN at 04/27/23 0450                Patient Name:  Rito Arnold  YOB: 1960  MRN:  [id number]  Admit Date:  4/26/2023  Patient Care Team:  Provider, No Known as PCP - " General      Subjective    History Present Illness     Chief Complaint   Patient presents with   • Eye Problem       Mr. Arnold is a 62 y.o. smoker with a history of chronic pain syndrome that presents to [facility] complaining of visual changes in his left eye.  He reports cloudy vision in the right inferior aspect of the left eye that initiated about 10 days ago.  He states he was seen by an ophthalmologist yesterday and there were concerns for left retinal artery occlusion.  He was sent to the ED for further evaluation.  He reports intermittent headaches but denies paresthesias, facial asymmetry, speech disturbance, and weakness.  A CT angiogram of the neck performed in the ED revealed a 70% stenosis of the proximal right and left ICA.      History of Present Illness  Review of Systems   Constitutional: Negative for chills and fever.   HENT: Negative for congestion and sore throat.    Eyes: Positive for visual disturbance. Negative for photophobia, pain and redness.   Respiratory: Negative for cough and shortness of breath.    Cardiovascular: Negative for chest pain, palpitations and leg swelling.   Gastrointestinal: Negative for abdominal pain, nausea and vomiting.   Endocrine: Negative for polydipsia, polyphagia and polyuria.   Musculoskeletal: Negative for arthralgias and myalgias.   Skin: Negative for rash and wound.   Neurological: Positive for headaches. Negative for dizziness, facial asymmetry, speech difficulty, weakness, light-headedness and numbness.        Personal History     History reviewed. No pertinent past medical history.  History reviewed. No pertinent surgical history.  History reviewed. No pertinent family history.  Social History     Tobacco Use   • Smoking status: Every Day     Packs/day: 1.50     Years: 20.00     Pack years: 30.00     Types: Cigarettes     Passive exposure: Current   • Smokeless tobacco: Never   Vaping Use   • Vaping Use: Never used   Substance Use Topics   •  Alcohol use: Defer   • Drug use: Never     Medications Prior to Admission   Medication Sig Dispense Refill Last Dose   • gabapentin (NEURONTIN) 600 MG tablet Daily.   Past Week   • HYDROcodone-acetaminophen (NORCO)  MG per tablet hydrocodone 10 mg-acetaminophen 325 mg tablet   4/26/2023     Allergies:  No Known Allergies    Objective     Objective     Vital Signs  Temp:  [97.4 °F (36.3 °C)-98.3 °F (36.8 °C)] 97.4 °F (36.3 °C)  Heart Rate:  [] 65  Resp:  [18] 18  BP: (129-166)/(76-97) 145/94  SpO2:  [92 %-98 %] 97 %  on   ;   Device (Oxygen Therapy): room air  Body mass index is 27.46 kg/m².    Physical Exam    Results Review:  I reviewed the patient's new clinical results.  I reviewed the patient's new imaging results and agree with the interpretation.  I reviewed the patient's other test results and agree with the interpretation  I personally viewed and interpreted the patient's EKG/Telemetry data  Discussed with ED provider.    Lab Results (last 24 hours)     Procedure Component Value Units Date/Time    CBC & Differential [[id number]]  (Abnormal) Collected: 04/26/23 2023    Specimen: Blood Updated: 04/26/23 2036    Narrative:      The following orders were created for panel order CBC & Differential.  Procedure                               Abnormality         Status                     ---------                               -----------         ------                     CBC Auto Differential[[id number]]        Abnormal            Final result                 Please view results for these tests on the individual orders.    Comprehensive Metabolic Panel [[id number]]  (Abnormal) Collected: 04/26/23 2023    Specimen: Blood Updated: 04/26/23 2059     Glucose 122 mg/dL      BUN 14 mg/dL      Creatinine 0.93 mg/dL      Sodium 143 mmol/L      Potassium 4.0 mmol/L      Chloride 106 mmol/L      CO2 25.6 mmol/L      Calcium 9.4 mg/dL      Total Protein 6.7 g/dL      Albumin 4.1 g/dL      ALT (SGPT) 8 U/L      AST  (SGOT) 12 U/L      Alkaline Phosphatase 67 U/L      Total Bilirubin 0.3 mg/dL      Globulin 2.6 gm/dL      A/G Ratio 1.6 g/dL      BUN/Creatinine Ratio 15.1     Anion Gap 11.4 mmol/L      eGFR 91.7 mL/min/1.73     Narrative:      GFR Normal >60  Chronic Kidney Disease <60  Kidney Failure <15      Protime-INR [[id number]]  (Normal) Collected: 04/26/23 2023    Specimen: Blood Updated: 04/26/23 2051     Protime 13.7 Seconds      INR 1.04    aPTT [[id number]]  (Normal) Collected: 04/26/23 2023    Specimen: Blood Updated: 04/26/23 2051     PTT 26.8 seconds     CBC Auto Differential [[id number]]  (Abnormal) Collected: 04/26/23 2023    Specimen: Blood Updated: 04/26/23 2036     WBC 9.16 10*3/mm3      RBC 3.93 10*6/mm3      Hemoglobin 11.6 g/dL      Hematocrit 33.9 %      MCV 86.3 fL      MCH 29.5 pg      MCHC 34.2 g/dL      RDW 14.5 %      RDW-SD 46.0 fl      MPV 8.5 fL      Platelets 260 10*3/mm3      Neutrophil % 68.6 %      Lymphocyte % 22.1 %      Monocyte % 7.2 %      Eosinophil % 1.5 %      Basophil % 0.4 %      Immature Grans % 0.2 %      Neutrophils, Absolute 6.28 10*3/mm3      Lymphocytes, Absolute 2.02 10*3/mm3      Monocytes, Absolute 0.66 10*3/mm3      Eosinophils, Absolute 0.14 10*3/mm3      Basophils, Absolute 0.04 10*3/mm3      Immature Grans, Absolute 0.02 10*3/mm3      nRBC 0.0 /100 WBC     Hemoglobin A1c [[id number]]  (Normal) Collected: 04/26/23 2023    Specimen: Blood Updated: 04/26/23 2321     Hemoglobin A1C 5.50 %     Narrative:      Hemoglobin A1C Ranges:    Increased Risk for Diabetes  5.7% to 6.4%  Diabetes                     >= 6.5%  Diabetic Goal                < 7.0%    TSH [[id number]]  (Normal) Collected: 04/26/23 2023    Specimen: Blood Updated: 04/26/23 2346     TSH 1.900 uIU/mL     T4, Free [[id number]]  (Normal) Collected: 04/26/23 2023    Specimen: Blood Updated: 04/26/23 2346     Free T4 1.26 ng/dL     Narrative:      Results may be falsely increased if patient taking Biotin.             Imaging Results (Last 24 Hours)     Procedure Component Value Units Date/Time    CT Angiogram Neck [[id number]] Collected: 04/26/23 2303     Updated: 04/26/23 2303    Narrative:        Patient: YANELY TOBAR  Time Out: 23:03  Exam(s): CTA NECK     EXAM:    CT Neck With Intravenous Contrast    CLINICAL HISTORY:     Reason for exam: Left eye vision changes.    TECHNIQUE:    Routine carotid CT protocol was performed with intravenous contrast.    NASCET criteria using the distal ICAs for comparison were used for   evaluation of stenoses.  CTDI is 115 mGy and DLP is 2725 mGy-cm.  This CT   exam was performed according to the principle of ALARA (As Low As   Reasonably Achievable) by using one or more of the following dose   reduction techniques: automated exposure control, adjustment of the mA   and or kV according to patient size, and or use of iterative   reconstruction technique.    COMPARISON:    None.    FINDINGS:     VASCULATURE:    Right common carotid artery:  Unremarkable.  No occlusion or   significant stenosis.  No dissection.    Right internal carotid artery: There is a 70% stenosis of the proximal   right ICA.  No dissection.    Right external carotid artery:  Unremarkable.  No occlusion.    Right vertebral artery:  Unremarkable.  No occlusion or significant   stenosis.  No dissection.      Left common carotid artery: There is a 70% stenosis of the proximal   left ICA.  No dissection.    Left external carotid artery:  Unremarkable.  No occlusion.    Left vertebral artery:  Unremarkable.  No occlusion or significant   stenosis.  No dissection.     NECK:    Bones joints: Moderate to advanced disc degeneration at C3-4, C4-5, C5-  6 and C6-7 with a mild spinal canal stenosis at C6-7.  Remote fracture   deformities of T4 and T5.    Soft tissues: Prominent mediastinal and hilar lymph nodes.  Prominent   cervical lymph nodes.    Lung apices: Findings concerning for bronchitis with pneumonitis and   chronic  interstitial scarring with moderate centrilobular emphysematous   changes.     CAROTID STENOSIS REFERENCE USING NASCET CRITERIA:    % ICA stenosis = (1 - narrowest ICA diameter diameter of distal   cervical ICA) x 100.    Mild - <50% stenosis.    Moderate - 50-69% stenosis.    Severe - 70-94% stenosis.    Near occlusion - 95-99% stenosis.    Occluded - 100% stenosis.    IMPRESSION:       There is a 70% stenosis of the proximal right and left ICA.      Impression:          Electronically signed by Stephanie Guzman MD on 04-26-23 at 2303    CT Angiogram Head [[id number]] Collected: 04/26/23 2256     Updated: 04/26/23 2256    Narrative:        Patient: YANELY TOBAR  Time Out: 22:55  Exam(s): CTA HEAD     EXAM:    CT Head With Intravenous Contrast    CLINICAL HISTORY:     Reason for exam: Left eye vision changes.    TECHNIQUE:  AI analysis of LVO was utilized    Axial computed tomographic images of the head with intravenous contrast.    CTDI is 115 mGy and DLP is 2725 mGy-cm.  This CT exam was performed   according to the principle of ALARA (As Low As Reasonably Achievable) by   using one or more of the following dose reduction techniques: automated   exposure control, adjustment of the mA and or kV according to patient   size, and or use of iterative reconstruction technique.    COMPARISON:    No relevant prior studies available.    FINDINGS:    Right internal carotid artery:  No acute findings.  Intracranial   segment is patent with no significant stenosis.  No aneurysm.    Right anterior cerebral artery:  Unremarkable.  No occlusion or   significant stenosis.  No aneurysm.    Right middle cerebral artery:  Unremarkable.  No occlusion or   significant stenosis.  No aneurysm.    Right posterior cerebral artery:  Unremarkable.  No occlusion or   significant stenosis.  No aneurysm.    Right vertebral artery:  Unremarkable as visualized.      Left internal carotid artery:  No acute findings.  Intracranial segment   is  patent with no significant stenosis.  No aneurysm.    Left anterior cerebral artery:  Unremarkable.  No occlusion or   significant stenosis.  No aneurysm.    Left middle cerebral artery:  Unremarkable.  No occlusion or   significant stenosis.  No aneurysm.    Left posterior cerebral artery:  Unremarkable.  No occlusion or   significant stenosis.  No aneurysm.    Left vertebral artery:  Unremarkable as visualized.      Basilar artery:  Unremarkable.  No occlusion or significant stenosis.    No aneurysm.    IMPRESSION:       Negative CT angiogram of the head.    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      Impression:          Electronically signed by Stephanie Guzman MD on 04-26-23 at 2255              ECG 12 Lead Other; vision changes   Preliminary Result   HEART RATE= 83  bpm   RR Interval= 723  ms   MI Interval= 182  ms   P Horizontal Axis= -29  deg   P Front Axis= 34  deg   QRSD Interval= 83  ms   QT Interval= 370  ms   QRS Axis= 18  deg   T Wave Axis= 50  deg   - NORMAL ECG -   Sinus rhythm   Baseline wander in lead(s) II,III,aVF   Electronically Signed By:    Date and Time of Study: 2023-04-26 20:22:15          Assessment/Plan     Active Hospital Problems    Diagnosis  POA   • **Visual disturbance [H53.9]  Unknown   • Tobacco abuse [Z72.0]  Unknown   • Chronic pain [G89.29]  Unknown       Left Eye Visual Disturbance  -Admit to complete stroke work up  -Check MRI and echo  -Neurology consult  -Vascular surgery consult for carotid stenosis  -Neuro checks/NIHSS  -Check TSH, lipid panel, and hgb A1C to assess for secondary risk factors  -Initiate daily aspirin and high dose statin  -PT/OT consults    Chronic Pain Syndrome  -Continue home dose of Norco and gabapentin    Anemia  -No signs of active bleeding  -He has no previous lab work to determine his baseline hgb  -Repeat CBC in AM    Tobacco Abuse  -Nicotine patch    -I discussed the patients findings  and my recommendations with patient.    VTE Prophylaxis - SCDs.  Code Status - Full code.       FRANCISCO JAVIER Rain  [facility]  04/27/23  05:02 EDT      Electronically signed by Mariangel Wolfe APRN at 04/27/23 0503          Emergency Department Notes      Virginia Bridges, RN at 04/26/23 1848        Patient to ER via car from home for loss of vision in L eye x 1 week  Patient saw eye dr was told he had plaque in retinal artery     Electronically signed by Virginia Bridges, RN at 04/26/23 1849     Meena Guzman, RN at 04/26/23 1854        Pt comes in for plaque in his left retinal artery. Pt went in to see his eye doctor because there was a spot in his left peripheral vision that he wanted checked out.     Electronically signed by Meena Guzman, ANNY at 04/26/23 1855     Virgil Fierro MD at 04/26/23 2046           EMERGENCY DEPARTMENT ENCOUNTER    Room Number:  36/36  Date seen:  4/26/2023  PCP: Provider, No Known  Historian(s): Patient and his wife      HPI:  Chief Complaint: Left eye vision changes  A complete HPI/ROS/PMH/PSH/SH/FH are unobtainable / limited due to: N/A  Context: Rito Arnold is a 62 y.o. male who presents to the ED after seeing a retinal ophthalmology specialist this afternoon for evaluation of cloudy vision deficit changes in his left eye.  Apparently 1 and half weeks ago, the patient had a new onset cloudy disturbance in the right inferior aspect field of vision affecting the left eye only.  This vision change has persisted throughout this time.  He does occasionally get headaches as well.  The patient has not had any change in speech.  He has not noticed any weakness affecting his arms or legs nor does he have any change in sensation to the face or arms or legs.  Apparently when he saw the retinal ophthalmology specialist today, there was concern for left retinal artery occlusion.  The specialist explained to the patient and his wife that he had a  "essentially had a \"stroke\" affecting his left eye and they strongly urged him to come to the emergency room for further testing tonight, especially because patient has not established with a PCP and has no means for prompt follow-up care in the outpatient setting.        PAST MEDICAL HISTORY  Active Ambulatory Problems     Diagnosis Date Noted   • No Active Ambulatory Problems     Resolved Ambulatory Problems     Diagnosis Date Noted   • No Resolved Ambulatory Problems     No Additional Past Medical History         PAST SURGICAL HISTORY  No past surgical history on file.      FAMILY HISTORY  No family history on file.      SOCIAL HISTORY  Social History     Socioeconomic History   • Marital status:          ALLERGIES  Patient has no known allergies.        REVIEW OF SYSTEMS  Review of Systems   Constitutional: Negative for activity change and fever.   Eyes: Positive for visual disturbance. Negative for pain.   Respiratory: Negative for cough and shortness of breath.    Cardiovascular: Negative for chest pain.   Gastrointestinal: Negative for abdominal pain.   Genitourinary: Negative for dysuria.   Skin: Negative for color change.   Neurological: Positive for headaches. Negative for syncope.   All other systems reviewed and are negative.       PHYSICAL EXAM  ED Triage Vitals   Temp Heart Rate Resp BP SpO2   04/26/23 1849 04/26/23 1849 04/26/23 1849 04/26/23 1852 04/26/23 1849   98.3 °F (36.8 °C) (!) 121 18 156/93 98 %      Temp src Heart Rate Source Patient Position BP Location FiO2 (%)   -- -- -- -- --              Physical Exam      GENERAL: Pleasant gentleman, calm, no diaphoresis, no acute distress  HENT: nares patent, normocephalic and atraumatic  EYES: no scleral icterus, EOMI, normal conjunctiva, PERRL  CV: regular rhythm, normal rate, normal distal pulses  RESPIRATORY: normal effort, no stridor, lungs clear to auscultation bilaterally  ABDOMEN: soft, nontender in all 4 quadrants  MUSCULOSKELETAL: no " deformity, no asymmetry  NEURO: alert, moves all extremities, follows commands, speech is clear, no facial droop, normal motor strength to all 4 extremities noted.  No sensory deficit to the extremities observed.  PSYCH:  calm, cooperative  SKIN: warm, dry    Vital signs and nursing notes reviewed.        LAB RESULTS  Recent Results (from the past 24 hour(s))   ECG 12 Lead Other; vision changes    Collection Time: 04/26/23  8:22 PM   Result Value Ref Range    QT Interval 370 ms   Comprehensive Metabolic Panel    Collection Time: 04/26/23  8:23 PM    Specimen: Blood   Result Value Ref Range    Glucose 122 (H) 65 - 99 mg/dL    BUN 14 8 - 23 mg/dL    Creatinine 0.93 0.76 - 1.27 mg/dL    Sodium 143 136 - 145 mmol/L    Potassium 4.0 3.5 - 5.2 mmol/L    Chloride 106 98 - 107 mmol/L    CO2 25.6 22.0 - 29.0 mmol/L    Calcium 9.4 8.6 - 10.5 mg/dL    Total Protein 6.7 6.0 - 8.5 g/dL    Albumin 4.1 3.5 - 5.2 g/dL    ALT (SGPT) 8 1 - 41 U/L    AST (SGOT) 12 1 - 40 U/L    Alkaline Phosphatase 67 39 - 117 U/L    Total Bilirubin 0.3 0.0 - 1.2 mg/dL    Globulin 2.6 gm/dL    A/G Ratio 1.6 g/dL    BUN/Creatinine Ratio 15.1 7.0 - 25.0    Anion Gap 11.4 5.0 - 15.0 mmol/L    eGFR 91.7 >60.0 mL/min/1.73   Protime-INR    Collection Time: 04/26/23  8:23 PM    Specimen: Blood   Result Value Ref Range    Protime 13.7 11.7 - 14.2 Seconds    INR 1.04 0.90 - 1.10   aPTT    Collection Time: 04/26/23  8:23 PM    Specimen: Blood   Result Value Ref Range    PTT 26.8 22.7 - 35.4 seconds   CBC Auto Differential    Collection Time: 04/26/23  8:23 PM    Specimen: Blood   Result Value Ref Range    WBC 9.16 3.40 - 10.80 10*3/mm3    RBC 3.93 (L) 4.14 - 5.80 10*6/mm3    Hemoglobin 11.6 (L) 13.0 - 17.7 g/dL    Hematocrit 33.9 (L) 37.5 - 51.0 %    MCV 86.3 79.0 - 97.0 fL    MCH 29.5 26.6 - 33.0 pg    MCHC 34.2 31.5 - 35.7 g/dL    RDW 14.5 12.3 - 15.4 %    RDW-SD 46.0 37.0 - 54.0 fl    MPV 8.5 6.0 - 12.0 fL    Platelets 260 140 - 450 10*3/mm3    Neutrophil %  68.6 42.7 - 76.0 %    Lymphocyte % 22.1 19.6 - 45.3 %    Monocyte % 7.2 5.0 - 12.0 %    Eosinophil % 1.5 0.3 - 6.2 %    Basophil % 0.4 0.0 - 1.5 %    Immature Grans % 0.2 0.0 - 0.5 %    Neutrophils, Absolute 6.28 1.70 - 7.00 10*3/mm3    Lymphocytes, Absolute 2.02 0.70 - 3.10 10*3/mm3    Monocytes, Absolute 0.66 0.10 - 0.90 10*3/mm3    Eosinophils, Absolute 0.14 0.00 - 0.40 10*3/mm3    Basophils, Absolute 0.04 0.00 - 0.20 10*3/mm3    Immature Grans, Absolute 0.02 0.00 - 0.05 10*3/mm3    nRBC 0.0 0.0 - 0.2 /100 WBC   Hemoglobin A1c    Collection Time: 04/26/23  8:23 PM    Specimen: Blood   Result Value Ref Range    Hemoglobin A1C 5.50 4.80 - 5.60 %   TSH    Collection Time: 04/26/23  8:23 PM    Specimen: Blood   Result Value Ref Range    TSH 1.900 0.270 - 4.200 uIU/mL   T4, Free    Collection Time: 04/26/23  8:23 PM    Specimen: Blood   Result Value Ref Range    Free T4 1.26 0.93 - 1.70 ng/dL       Ordered the above labs and reviewed the results.        RADIOLOGY  CT Angiogram Neck    Result Date: 4/26/2023  Patient: YANELY TOBAR  Time Out: 23:03 Exam(s): CTA NECK EXAM:   CT Neck With Intravenous Contrast CLINICAL HISTORY:    Reason for exam: Left eye vision changes. TECHNIQUE:   Routine carotid CT protocol was performed with intravenous contrast.  NASCET criteria using the distal ICAs for comparison were used for evaluation of stenoses.  CTDI is 115 mGy and DLP is 2725 mGy-cm.  This CT exam was performed according to the principle of ALARA (As Low As Reasonably Achievable) by using one or more of the following dose reduction techniques: automated exposure control, adjustment of the mA and or kV according to patient size, and or use of iterative reconstruction technique. COMPARISON:   None. FINDINGS:  VASCULATURE:   Right common carotid artery:  Unremarkable.  No occlusion or significant stenosis.  No dissection.   Right internal carotid artery: There is a 70% stenosis of the proximal right ICA.  No dissection.    Right external carotid artery:  Unremarkable.  No occlusion.   Right vertebral artery:  Unremarkable.  No occlusion or significant stenosis.  No dissection.   Left common carotid artery: There is a 70% stenosis of the proximal left ICA.  No dissection.   Left external carotid artery:  Unremarkable.  No occlusion.   Left vertebral artery:  Unremarkable.  No occlusion or significant stenosis.  No dissection.  NECK:   Bones joints: Moderate to advanced disc degeneration at C3-4, C4-5, C5- 6 and C6-7 with a mild spinal canal stenosis at C6-7.  Remote fracture deformities of T4 and T5.   Soft tissues: Prominent mediastinal and hilar lymph nodes.  Prominent cervical lymph nodes.   Lung apices: Findings concerning for bronchitis with pneumonitis and chronic interstitial scarring with moderate centrilobular emphysematous changes.  CAROTID STENOSIS REFERENCE USING NASCET CRITERIA:   % ICA stenosis = (1 - narrowest ICA diameter diameter of distal cervical ICA) x 100.   Mild - <50% stenosis.   Moderate - 50-69% stenosis.   Severe - 70-94% stenosis.   Near occlusion - 95-99% stenosis.   Occluded - 100% stenosis. IMPRESSION:     There is a 70% stenosis of the proximal right and left ICA.     Electronically signed by Stephanie Guzman MD on 04-26-23 at 2303    CT Angiogram Head    Result Date: 4/26/2023  Patient: YANELY TOBAR  Time Out: 22:55 Exam(s): CTA HEAD EXAM:   CT Head With Intravenous Contrast CLINICAL HISTORY:    Reason for exam: Left eye vision changes. TECHNIQUE: AI analysis of LVO was utilized   Axial computed tomographic images of the head with intravenous contrast.   CTDI is 115 mGy and DLP is 2725 mGy-cm.  This CT exam was performed according to the principle of ALARA (As Low As Reasonably Achievable) by using one or more of the following dose reduction techniques: automated exposure control, adjustment of the mA and or kV according to patient size, and or use of iterative reconstruction technique. COMPARISON:   No  relevant prior studies available. FINDINGS:   Right internal carotid artery:  No acute findings.  Intracranial segment is patent with no significant stenosis.  No aneurysm.   Right anterior cerebral artery:  Unremarkable.  No occlusion or significant stenosis.  No aneurysm.   Right middle cerebral artery:  Unremarkable.  No occlusion or significant stenosis.  No aneurysm.   Right posterior cerebral artery:  Unremarkable.  No occlusion or significant stenosis.  No aneurysm.   Right vertebral artery:  Unremarkable as visualized.   Left internal carotid artery:  No acute findings.  Intracranial segment is patent with no significant stenosis.  No aneurysm.   Left anterior cerebral artery:  Unremarkable.  No occlusion or significant stenosis.  No aneurysm.   Left middle cerebral artery:  Unremarkable.  No occlusion or significant stenosis.  No aneurysm.   Left posterior cerebral artery:  Unremarkable.  No occlusion or significant stenosis.  No aneurysm.   Left vertebral artery:  Unremarkable as visualized.   Basilar artery:  Unremarkable.  No occlusion or significant stenosis.  No aneurysm. IMPRESSION:     Negative CT angiogram of the head. 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     Electronically signed by Stephanie Guzman MD on 04-26-23 at 2255      Ordered the above noted radiological studies. Reviewed by me in PACS.          PROCEDURES  Procedures    EKG           EKG time/Interp time: 2022/2023  Rhythm/Rate: Sinus rhythm, 83 bpm  P waves and ND: Present, 182 ms  QRS, axis: 83 ms, normal axis  ST and T waves: No ST segment elevations are notable.    Independently interpreted by me contemporaneously with treatment      MEDICATIONS GIVEN IN ER  Medications   sodium chloride 0.9 % flush 10 mL (has no administration in time range)   iopamidol (ISOVUE-370) 76 % injection 95 mL (95 mL Intravenous Given by Other 4/26/23 2121)   aspirin tablet 325 mg (325 mg Oral  Given 4/26/23 2332)           MEDICAL DECISION MAKING, PROGRESS, and CONSULTS    All labs have been independently reviewed by me.  All radiology studies have been reviewed by me and I have also reviewed the radiology report.   EKG's independently viewed and interpreted by me.  Discussion below represents my analysis of pertinent findings related to patient's condition, differential diagnosis, treatment plan and final disposition.      Additional sources:  - Discussed/ obtained information from independent historians: Patient's wife provides much of the history at the bedside.    - External (non-ED) record review: There are no recent records on file in patient's medical chart for review.    - Chronic or social conditions impacting care: Patient is a smoker - he tells me that he decided to quit today.  He is not established with a local PCP    - Shared decision making: I discussed disposition recommendations with the patient.  I explained that my recommendation would be to observe him in the hospital for further testing and consultation with neurology and/or vascular surgery specialists in the morning as we complete his work-up with additional tests.  Alternatively, I explained that if he discharges home then he would need to establish a new PCP and try to get the remaining test such as carotid ultrasound and echocardiogram completed as soon as possible.  Ultimately, he agreed to stay in the hospital tonight for expediting these tests and consultation processes      Orders placed during this visit:  Orders Placed This Encounter   Procedures   • CT Angiogram Head   • CT Angiogram Neck   • Comprehensive Metabolic Panel   • Protime-INR   • aPTT   • CBC Auto Differential   • Hemoglobin A1c   • TSH   • T4, Free   • Monitor Blood Pressure   • Cardiac Monitoring   • LHA (on-call MD unless specified) Details   • ECG 12 Lead Other; vision changes   • Insert Peripheral IV   • Initiate Observation Status   • CBC & Differential            Differential diagnosis includes but is not limited to:    Acute stroke, central retinal artery occlusion, vitreous hemorrhage, retinal detachment, hypertensive retinopathy      Independent interpretation of labs, radiology studies, and discussions with consultants:  ED Course as of 04/26/23 2354 Wed Apr 26, 2023 2343 I independently interpreted the CT head without contrast study and my findings are: No intracranial hemorrhage, no midline shift or mass effect. [JOAO]   2344 I reviewed the CT angiogram studies of the head and neck.  He does have bilateral stenosis of the ICAs at 70%. [JOAO]   2344 Patient clearly has risk factors for cardiovascular disease is a smoker and not established with any PCP for routine healthcare checkups.  I explained to him my recommendation to stay in the hospital tonight for further testing and management of his symptoms.  Ultimately after some conversation between him and his wife, he agreed to do so.  Paging [facility] at this time. [JOAO]   2353 I just discussed with Jayson from [facility] about this patient.  She agrees to accept him to the hospitalist service on behalf of Dr. Howard [JOAO]      ED Course User Index  [JOAO] Virgil Fierro MD         DIAGNOSIS  Final diagnoses:   Changes in vision   Internal carotid artery stenosis, bilateral         DISPOSITION  Observation to [facility]        Latest Documented Vital Signs:  As of 23:54 EDT  BP- 129/76 HR- 67 Temp- 98.3 °F (36.8 °C) O2 sat- 93%              --    Please note that portions of this were completed with a voice recognition program.       Note Disclaimer: At [facility], we believe that sharing information builds trust and better relationships. You are receiving this note because you are receiving care at [facility] or recently visited. It is possible you will see health information before a provider has talked with you about it. This kind of information can be easy to misunderstand. To help you fully understand what it means  for your health, we urge you to discuss this note with your provider.           Virgil Fierro MD  04/26/23 2354      Electronically signed by Virgil Fierro MD at 04/26/23 2354     Macy Santoro, RN at 04/26/23 2357          Nursing report ED to floor  Rito Arnold  62 y.o.  male    HPI :   Chief Complaint   Patient presents with   • Eye Problem       Admitting doctor:   Vu Howard MD    Admitting diagnosis:   The primary encounter diagnosis was Changes in vision. A diagnosis of Internal carotid artery stenosis, bilateral was also pertinent to this visit.    Code status:   Current Code Status       Date Active Code Status Order ID Comments User Context       Not on file            Allergies:   Patient has no known allergies.    Isolation:   No active isolations    Intake and Output  No intake or output data in the 24 hours ending 04/26/23 2357    Weight:       04/26/23  1852   Weight: 88.5 kg (195 lb)       Most recent vitals:   Vitals:    04/26/23 2101 04/26/23 2201 04/26/23 2231 04/26/23 2233   BP: 144/96 134/85 129/76    Pulse: 73 79 71 67   Resp:       Temp:       SpO2: 93% 92% 93% 93%   Weight:       Height:           Active LDAs/IV Access:   Lines, Drains & Airways       Active LDAs       Name Placement date Placement time Site Days    Peripheral IV 04/26/23 2023 Right Antecubital 04/26/23 2023  Antecubital  less than 1                    Labs (abnormal labs have a star):   Labs Reviewed   COMPREHENSIVE METABOLIC PANEL - Abnormal; Notable for the following components:       Result Value    Glucose 122 (*)     All other components within normal limits    Narrative:     GFR Normal >60  Chronic Kidney Disease <60  Kidney Failure <15     CBC WITH AUTO DIFFERENTIAL - Abnormal; Notable for the following components:    RBC 3.93 (*)     Hemoglobin 11.6 (*)     Hematocrit 33.9 (*)     All other components within normal limits   PROTIME-INR - Normal   APTT - Normal   HEMOGLOBIN A1C - Normal    Narrative:      Hemoglobin A1C Ranges:    Increased Risk for Diabetes  5.7% to 6.4%  Diabetes                     >= 6.5%  Diabetic Goal                < 7.0%   TSH - Normal   T4, FREE - Normal    Narrative:     Results may be falsely increased if patient taking Biotin.     CBC AND DIFFERENTIAL    Narrative:     The following orders were created for panel order CBC & Differential.  Procedure                               Abnormality         Status                     ---------                               -----------         ------                     CBC Auto Differential[[id number]]        Abnormal            Final result                 Please view results for these tests on the individual orders.       EKG:   ECG 12 Lead Other; vision changes   Preliminary Result   HEART RATE= 83  bpm   RR Interval= 723  ms   RI Interval= 182  ms   P Horizontal Axis= -29  deg   P Front Axis= 34  deg   QRSD Interval= 83  ms   QT Interval= 370  ms   QRS Axis= 18  deg   T Wave Axis= 50  deg   - NORMAL ECG -   Sinus rhythm   Baseline wander in lead(s) II,III,aVF   Electronically Signed By:    Date and Time of Study: 2023-04-26 20:22:15          Meds given in ED:   Medications   sodium chloride 0.9 % flush 10 mL (has no administration in time range)   iopamidol (ISOVUE-370) 76 % injection 95 mL (95 mL Intravenous Given by Other 4/26/23 2121)   aspirin tablet 325 mg (325 mg Oral Given 4/26/23 2332)       Imaging results:  CT Angiogram Neck    Result Date: 4/26/2023  Electronically signed by Stephanie Guzman MD on 04-26-23 at 2303    CT Angiogram Head    Result Date: 4/26/2023  Electronically signed by Stephanie Guzman MD on 04-26-23 at 2255     Ambulatory status:   - ad martine    Social issues:   Social History     Socioeconomic History   • Marital status:        NIH Stroke Scale:         Macy Pruitt RN  04/26/23 23:57 EDT     Call [phone] with questions    Electronically signed by Macy Santoro RN at 04/26/23 2859                Consult  Notes (last 48 hours)      Seun Herrera MD at 23 0744      Consult Orders    1. Inpatient Vascular Surgery Consult [[id number]] ordered by Mariangel Wolfe APRN at 23 0344               Name: Rito Arnold ADMIT: 2023   : 1960  PCP: Provider, No Known    MRN: [id number] LOS: 0 days   AGE/SEX: 62 y.o. male  ROOM: [address]    Patient Care Team:  Provider, No Known as PCP - General  Chief Complaint   Patient presents with   • Eye Problem     CC: Carotid stenosis    History of Present Illness  62 y.o. male who does not follow regularly with a primary care physician, admitted to hospital after being sent by an ophthalmologist with partial left retinal stroke.  Patient without previous symptomatic cerebrovascular disease including TIA, amaurosis fugax and stroke.  Patient noted the appearance of clouded vision or visual field defect in the right lower visual field of his left eye about a week and a half ago.  Onset was sudden and on precipitated.  Course nonprogressive, without improvement.  No other associated symptoms.  He indicates that he was felt to have a retinal stroke, but of course no records were forwarded from the referring ophthalmologist office.  Since admit, EKG (normal sinus rhythm, no ischemic changes) and a CT angiogram (bilateral ICA stenosis around 70%) were obtained.  No known cardiopulmonary disease, though he is a 1.5 pack/day cigarette smoker, who says he is going to quit.  No diabetes.  No lower extremity claudications.  Has chronic pain related to lower back, possible medical correction.    Review of Systems   Eyes: Positive for visual disturbance.   Musculoskeletal: Positive for back pain.   All other systems reviewed and are negative.    History reviewed. No pertinent past medical history.  History reviewed. No pertinent surgical history.  History reviewed. No pertinent family history.    Social History     Tobacco Use   • Smoking status: Every  "Day     Packs/day: 1.50     Years: 20.00     Pack years: 30.00     Types: Cigarettes     Passive exposure: Current   • Smokeless tobacco: Never   Vaping Use   • Vaping Use: Never used   Substance Use Topics   • Alcohol use: Defer   • Drug use: Never     Medications Prior to Admission   Medication Sig Dispense Refill Last Dose   • gabapentin (NEURONTIN) 600 MG tablet Daily.   Past Week   • HYDROcodone-acetaminophen (NORCO)  MG per tablet hydrocodone 10 mg-acetaminophen 325 mg tablet   4/26/2023     aspirin, 325 mg, Oral, Daily   Or  aspirin, 300 mg, Rectal, Daily  atorvastatin, 80 mg, Oral, Nightly  gabapentin, 600 mg, Oral, Daily    Patient has no known allergies.    Vital Signs and Labs:  Vital Signs   Patient Vitals for the past 24 hrs:   BP Temp Temp src Pulse Resp SpO2 Height Weight   04/27/23 0445 145/94 97.4 °F (36.3 °C) Oral 65 18 97 % -- --   04/27/23 0129 -- -- -- -- -- -- -- 86.8 kg (191 lb 5.8 oz)   04/27/23 0111 156/94 97.9 °F (36.6 °C) Oral 71 18 96 % -- --   04/27/23 0001 166/97 -- -- 71 -- 94 % -- --   04/26/23 2233 -- -- -- 67 -- 93 % -- --   04/26/23 2231 129/76 -- -- 71 -- 93 % -- --   04/26/23 2201 134/85 -- -- 79 -- 92 % -- --   04/26/23 2101 144/96 -- -- 73 -- 93 % -- --   04/26/23 1931 156/96 -- -- 101 -- 95 % -- --   04/26/23 1852 156/93 -- -- -- -- -- 177.8 cm (70\") 88.5 kg (195 lb)   04/26/23 1849 -- 98.3 °F (36.8 °C) -- (!) 121 18 98 % -- --     BMI:  Body mass index is 27.46 kg/m².    Physical Exam:  Physical Exam  Vitals reviewed.   Constitutional:       General: He is not in acute distress.     Appearance: Normal appearance. He is normal weight. He is not ill-appearing, toxic-appearing or diaphoretic.   HENT:      Head: Normocephalic and atraumatic.      Right Ear: External ear normal.      Left Ear: External ear normal.      Nose: Nose normal.      Mouth/Throat:      Mouth: Mucous membranes are dry.   Eyes:      General: No scleral icterus.     Extraocular Movements: Extraocular " movements intact.      Conjunctiva/sclera: Conjunctivae normal.      Pupils: Pupils are equal, round, and reactive to light.      Comments: Patient reports mild left eye visual field defect in medial lower quadrant.  Still can read and see with left eye.   Neck:      Vascular: No carotid bruit.   Cardiovascular:      Rate and Rhythm: Normal rate and regular rhythm.      Pulses: Normal pulses.      Heart sounds: Normal heart sounds.      Comments: Easily palpable radial and pedal artery pulses bilaterally.  No lower extremity edema.  Pulmonary:      Effort: Pulmonary effort is normal. No respiratory distress.      Breath sounds: Normal breath sounds. No stridor.   Abdominal:      Palpations: Abdomen is soft.      Tenderness: There is no abdominal tenderness. There is no guarding.   Musculoskeletal:         General: Normal range of motion.      Cervical back: Normal range of motion and neck supple. No rigidity or tenderness.   Skin:     General: Skin is warm and dry.      Capillary Refill: Capillary refill takes less than 2 seconds.      Coloration: Skin is not jaundiced or pale.   Neurological:      General: No focal deficit present.      Mental Status: He is alert and oriented to person, place, and time.      Cranial Nerves: No cranial nerve deficit.   Psychiatric:         Mood and Affect: Mood normal.         Behavior: Behavior normal.         Thought Content: Thought content normal.         Judgment: Judgment normal.     CBC    Results from last 7 days   Lab Units 04/26/23 2023   WBC 10*3/mm3 9.16   HEMOGLOBIN g/dL 11.6*   PLATELETS 10*3/mm3 260     BMP   Results from last 7 days   Lab Units 04/26/23 2023   SODIUM mmol/L 143   POTASSIUM mmol/L 4.0   CHLORIDE mmol/L 106   CO2 mmol/L 25.6   BUN mg/dL 14   CREATININE mg/dL 0.93   GLUCOSE mg/dL 122*     Cr Clearance Estimated Creatinine Clearance: 98.5 mL/min (by C-G formula based on SCr of 0.93 mg/dL).    EKG personally reviewed.  Sinus rhythm, no ischemic  changes.  MRI of brain obtained.  No suspicious areas for stroke by my interpretation, but formal report not available.  CT angiogram personally reviewed.  No arch disease.  Proximal left subclavian artery plaque without stenosis.  Significant left ICA stenosis associated with soft plaque, with dilated carotid bulb measuring 10 mm.  Right ICA stenosis described as 70%, but may not be as severe.  Left carotid bulb not dilated.  Lungs with impressive emphysema.    Active Hospital Problems    Diagnosis  POA   • **Visual disturbance [H53.9]  Yes   • Tobacco abuse [Z72.0]  Yes   • Chronic pain [G89.29]  Yes   • Bilateral carotid artery stenosis [I65.23]  Yes   • COPD (chronic obstructive pulmonary disease) [J44.9]  Yes      Resolved Hospital Problems   No resolved problems to display.     Problem Points:  3:  Patient has a new problem, with no additional work-up planned (max of 1)  Total problem points:3    Data Points:  1:  I have reviewed or order clinical lab test  1:  I have reviewed or order radiology test (except heart catheterization or echo)  2:  I have personally and independently review of image, tracing, or specimen  1:  I have personally decided to obtain of records  Total data points:4 or more    Risk Points:  High:  Abrupt change in neuorlogic exam    MDM requires 2/3 (Problem points, Data points and Risk)  MDM Prob point Data point Risk   SF 1 1 Minimal   Low 2 2 Low   Mod 3 3 Moderate   High 4 4 High     Code requires 3/3 (MDM, History and Exam)  Code MDM History Exam Time   17378 SF/Low Detailed Detailed 30   82801 Mod Comprehensive Comprehensive 50   96589 High Comprehensive Comprehensive 70     Detailed history:  4 elements HPI or status of 3 chronic problems; 2-9 system ROS  Comprehensive:  4 elements HPI or status of 3 chronic problems;  10 system ROS    Detailed Exam:  12 findings from any organ system  Comprehensive Exam:  2 findings from each of 9 systems.   91617    Assessment & Plan       Visual  disturbance    Tobacco abuse    Chronic pain    Bilateral carotid artery stenosis    COPD (chronic obstructive pulmonary disease)    62 y.o. male sent to hospital with 1-1/2-week history of cloudy vision the lower right visual field of his left eye, apparently having been identified with a retinal stroke.  Evaluation included CT angiogram, demonstrating bilateral ICA stenosis 70%.  Not previously treated with any platelet or any lipid therapy.  Patient heavy smoker, says he is going to quit, but no other cardiopulmonary disease or diabetes.  Appears to have symptomatic left ICA stenosis and is a candidate for left CEA this admit.  Has been started on aspirin and high-dose statin therapy.  Hope to arrange surgery for tomorrow.  Have discussed nature of problem with patient, and recommendations for treatment with antiplatelet and antilipid therapy and the importance of smoking cessation.  Discussed treatment of symptomatic left ICA stenosis, with possible recommendation for treatment of asymptomatic right ICA stenosis at some point in the future.  Given smoking history he has relative anemia.  Echo scheduled for today to complete cerebrovascular embolism work-up.  Do not believe he will require formal cardiac risk assessment unless something glaring is discovered by echo.  He needs primary care.    I discussed the patients findings and my recommendations with patient.    Seun Herrera MD  04/27/23  08:04 EDT    Please call my office with any question: [phone]        Electronically signed by Seun Herrera MD at 04/27/23 0879

## 2023-04-27 NOTE — CONSULTS
Stroke Consult Note    Patient Name: Rito Arnold   MRN: [id number]  Age: 62 y.o.  Sex: male  : 1960    Primary Care Physician: Provider, No Known  Referring Physician:  Vu Howard MD    Handedness: Right  Race: White    Chief Complaint/Reason for Consultation: Left eye vision trouble for 10 days    Subjective .  HPI: 62-year-old right-handed white male with no major vascular risk factors except smoking, who comes in with 10-12 days of left eye vision changes for which he went to optometrist first, then ophthalmologist and retina specialist who eventually sent him to the ER.  He was noted to have branch occlusion of the left central retinal artery.  Along the work-up he was found to have left ICA stenosis with mixed plaque for which vascular surgery has been consulted.  Patient denies having any significant improvement of the symptoms or any worsening or any new symptoms.  Denies any focal weakness/numbness/slurred speech/facial droop.    Last Known Normal Date/Time: About 12 days ago EST     Review of Systems   Eyes: Positive for visual disturbance.   All other systems reviewed and are negative.     History reviewed. No pertinent past medical history.  History reviewed. No pertinent surgical history.  History reviewed. No pertinent family history.  Social History     Socioeconomic History   • Marital status:    Tobacco Use   • Smoking status: Every Day     Packs/day: 1.50     Years: 20.00     Pack years: 30.00     Types: Cigarettes     Passive exposure: Current   • Smokeless tobacco: Never   Vaping Use   • Vaping Use: Never used   Substance and Sexual Activity   • Alcohol use: Defer   • Drug use: Never   • Sexual activity: Defer     No Known Allergies  Prior to Admission medications    Medication Sig Start Date End Date Taking? Authorizing Provider   gabapentin (NEURONTIN) 600 MG tablet Daily.   Yes Provider, MD Alpesh   HYDROcodone-acetaminophen (NORCO)  MG per tablet hydrocodone 10  mg-acetaminophen 325 mg tablet   Yes Provider, MD Alpesh             Objective     Temp:  [97.4 °F (36.3 °C)-98.3 °F (36.8 °C)] 97.5 °F (36.4 °C)  Heart Rate:  [] 80  Resp:  [18] 18  BP: (129-166)/(76-97) 144/82  Neurological Exam  Mental Status  Awake, alert and oriented to person, place and time.Alert. Speech is normal. Language is fluent with no aphasia.    Cranial Nerves  CN II: Visual fields full to confrontation. Part of the vision in the left eye is gone.  CN III, IV, VI: Extraocular movements intact bilaterally. Normal lids and orbits bilaterally. Pupils equal round and reactive to light bilaterally.  CN V: Facial sensation is normal.  CN VII: Full and symmetric facial movement.  CN IX, X: Palate elevates symmetrically  CN XI: Shoulder shrug strength is normal.  CN XII: Tongue midline without atrophy or fasciculations.    Motor  Normal muscle bulk throughout. No fasciculations present. Normal muscle tone. Strength is 5/5 throughout all four extremities.    Sensory  Light touch is normal in upper and lower extremities.     Coordination    No dysmetria.    Gait    Not assessed.      Physical Exam  Vitals and nursing note reviewed.   Constitutional:       Appearance: Normal appearance.   HENT:      Head: Normocephalic and atraumatic.   Eyes:      General: Lids are normal.      Extraocular Movements: Extraocular movements intact.      Pupils: Pupils are equal, round, and reactive to light.   Cardiovascular:      Rate and Rhythm: Normal rate and regular rhythm.   Pulmonary:      Effort: Pulmonary effort is normal. No respiratory distress.   Musculoskeletal:      Cervical back: Normal range of motion and neck supple.   Neurological:      Mental Status: He is alert.      Motor: Motor strength is normal.   Psychiatric:         Mood and Affect: Mood normal.         Speech: Speech normal.         Behavior: Behavior normal.         Acute Stroke Data    IV Thrombolytic (TPA/Tenecteplase) Inclusion / Exclusion  Criteria    Time: 13:52 EDT  Person Administering Scale: Corbin Rodriguez MD    Inclusion Criteria  [x]   18 years of age or greater   []   Onset of symptoms < 4.5 hours before beginning treatment (stroke onset = time patient was last seen well or without symptoms).   [x]   Diagnosis of acute ischemic stroke causing measurable disabling deficit (Complete Hemianopia, Any Aphasia, Visual or Sensory Extinction, Any weakness limiting sustained effort against gravity)   [x]   Any remaining deficit considered potentially disabling in view of patient and practitioner   Exclusion criteria (Do not proceed with Alteplase if any are checked under exclusion criteria)  [x]   Onset unknown or GREATER than 4.5 hours   []   ICH on CT/MRI   []   CT demonstrates hypodensity representing acute or subacute infarct   []   Significant head trauma or prior stroke in the previous 3 months   []   Symptoms suggestive of subarachnoid hemorrhage   []   History of un-ruptured intracranial aneurysm GREATER than 10 mm   []   Recent intracranial or intraspinal surgery within the last 3 months   []   Arterial puncture at a non-compressible site in the previous 7 days   []   Active internal bleeding   []   Acute bleeding tendency   []   Platelet count LESS than 100,000 for known hematological diseases such as leukemia, thrombocytopenia or chronic cirrhosis   []   Current use of anticoagulant with INR GREATER than 1.7 or PT GREATER than 15 seconds, aPTT GREATER than 40 seconds   []   Heparin received within 48 hours, resulting in abnormally elevated aPTT GREATER than upper limit of normal   []   Current use of direct thrombin inhibitors or direct factor Xa inhibitors in the past 48 hours   []   Elevated blood pressure refractory to treatment (systolic GREATER than 185 mm/Hg or diastolic  GREATER than 110 mm/Hg   []   Suspected infective endocarditis and aortic arch dissection   []   Current use of therapeutic treatment dose of low-molecular-weight  heparin (LMWH) within the previous 24 hours   []   Structural GI malignancy or bleed   Relative exclusion for all patients  []   Only minor nondisabling symptoms   []   Pregnancy   []   Seizure at onset with postictal residual neurological impairments   []   Major surgery or previous trauma within past 14 days   []   History of previous spontaneous ICH, intracranial neoplasm, or AV malformation   []   Postpartum (within previous 14 days)   []   Recent GI or urinary tract hemorrhage (within previous 21 days)   []   Recent acute MI (within previous 3 months)   []   History of unruptured intracranial aneurysm LESS than 10 mm   []   History of ruptured intracranial aneurysm   []   Blood glucose LESS than 50 mg/dL (2.7 mmol/L)   []   Dural puncture within the last 7 days   []   Known GREATER than 10 cerebral microbleeds   Additional exclusions for patients with symptoms onset between 3 and 4.5 hours.  []   Age > 80.   []   On any anticoagulants regardless of INR  >>> Warfarin (Coumadin), Heparin, Enoxaparin (Lovenox), fondaparinux (Arixtra), bivalirudin (Angiomax), Argatroban, dabigatran (Pradaxa), rivaroxaban (Xarelto), or apixaban (Eliquis)   []   Severe stroke (NIHSS > 25).   []   History of BOTH diabetes and previous ischemic stroke.   []   The risks and benefits have been discussed with the patient or family related to the administration of IV alteplase for stroke symptoms.   []   I have discussed and reviewed the patient's case and imaging with the attending prior to IV Thrombolytic (TPA/Tenecteplase).    Time Thrombolytic administered       Hospital Meds:  Scheduled- aspirin, 325 mg, Oral, Daily   Or  aspirin, 300 mg, Rectal, Daily  atorvastatin, 80 mg, Oral, Nightly  gabapentin, 600 mg, Oral, Daily      Infusions- sodium chloride, 75 mL/hr, Last Rate: 75 mL/hr (04/27/23 0651)       PRNs- •  acetaminophen **OR** acetaminophen  •  bisacodyl  •  HYDROcodone-acetaminophen  •  ondansetron  •  [COMPLETED] Insert  Peripheral IV **AND** sodium chloride    Functional Status Prior to Current Stroke/[place] Score: 0    NIH Stroke Scale  Time: 13:52 EDT  Person Administering Scale: Corbin Rodriguez MD    1a  Level of consciousness: 0=alert; keenly responsive   1b. LOC questions:  0=Performs both tasks correctly   1c. LOC commands: 0=Performs both tasks correctly   2.  Best Gaze: 0=normal   3.  Visual: 0=No visual loss   4. Facial Palsy: 0=Normal symmetric movement   5a.  Motor left arm: 0=No drift, limb holds 90 (or 45) degrees for full 10 seconds   5b.  Motor right arm: 0=No drift, limb holds 90 (or 45) degrees for full 10 seconds   6a. motor left le=No drift, limb holds 90 (or 45) degrees for full 10 seconds   6b  Motor right le=No drift, limb holds 90 (or 45) degrees for full 10 seconds   7. Limb Ataxia: 0=Absent   8.  Sensory: 0=Normal; no sensory loss   9. Best Language:  0=No aphasia, normal   10. Dysarthria: 0=Normal   11. Extinction and Inattention: 0=No abnormality    Total:   0       Results Reviewed:  I have personally reviewed current lab, radiology, and data   CT head shows no acute changes, no hemorrhage  MRI brain shows no acute findings  CT angiogram of head and neck shows left more than right ICA atherosclerosis with soft plaque, with left ICA about 70% stenosis and right ICA about 50% stenosis  Labs were reviewed              Assessment/Plan:      1. Central retinal artery occlusion, left, branch.  Secondary to left ICA atherosclerosis, atheroembolic.  Vascular surgery has been consulted, and plan is for left CEA tomorrow. Continue him on aspirin 325 mg, Lipitor 80 mg daily for secondary stroke prevention.  2. Bilateral carotid stenosis.  Left more than right.  Will get carotid ultrasound to get baseline velocities.  He will need follow-up for the right ICA stenosis, and if it worsens more than 80%, he may need right CEA eventually.  3. He may have mild underlying hypertension, continue to monitor his  blood pressure.  4. Follow-up on his lipid panel.  5. Smoking.  He has cut down smoking significantly to 2 to 3 cigarettes a day. Quit smoking completely.    Case was discussed with patient, his wife, nursing and the primary team.  Thank you for the consult.          Corbin Rodriguez MD  April 27, 2023  13:52 EDT

## 2023-04-27 NOTE — CASE MANAGEMENT/SOCIAL WORK
Discharge Planning Assessment  [facility]     Patient Name: Rito Arnold  MRN: [id number]  Today's Date: 4/27/2023    Admit Date: 4/26/2023    Plan: Home with family   Discharge Needs Assessment     Row Name 04/27/23 1604       Living Environment    People in Home spouse    Name(s) of People in Home Angela Mixon    Current Living Arrangements home    Potentially Unsafe Housing Conditions none    Primary Care Provided by self    Provides Primary Care For no one    Family Caregiver if Needed spouse    Family Caregiver Names Angela Mixon [phone]    Quality of Family Relationships helpful;involved;supportive    Able to Return to Prior Arrangements yes       Resource/Environmental Concerns    Resource/Environmental Concerns none       Transition Planning    Patient/Family Anticipates Transition to home with family    Patient/Family Anticipated Services at Transition none    Transportation Anticipated family or friend will provide       Discharge Needs Assessment    Equipment Currently Used at Home none    Concerns to be Addressed discharge planning               Discharge Plan     Row Name 04/27/23 1607       Plan    Plan Home with family    Patient/Family in Agreement with Plan yes    Plan Comments CCP spoke with patient and patient's wife, Angela, at bedside; CCP role explained, face sheet verified, and discharge plan discussed. Patient resides with spouse in two-level home with three steps to enter. Patient independent with ADLs and denies use of any DME. Confirms [facility]. Denies any history of HH and SNF. Patient ambulated 400ft with PT and denies any known discharge needs at this time. Surgery scheduled for tomorrow 4/28. Plans to return home with family. Spouse to transport home at discharge. CCP to follow for any post surgery needs. Cedrick WALKER LCSW              Continued Care and Services - Admitted Since 4/26/2023    Coordination has not been started for this encounter.        Expected Discharge Date and Time     Expected Discharge Date Expected Discharge Time    May 1, 2023          Demographic Summary     Row Name 04/27/23 1607       General Information    Admission Type inpatient    Arrived From home    Reason for Consult discharge planning    Preferred Language English               Functional Status     Row Name 04/27/23 1607       Functional Status    Usual Activity Tolerance good    Current Activity Tolerance good       Functional Status, IADL    Medications independent    Meal Preparation independent    Housekeeping independent    Laundry independent    Shopping independent       Mental Status    General Appearance WDL WDL               Psychosocial    No documentation.                Abuse/Neglect    No documentation.                Legal    No documentation.                Substance Abuse    No documentation.                Patient Forms    No documentation.                   Cedrick Chambers

## 2023-04-28 ENCOUNTER — APPOINTMENT (OUTPATIENT)
Dept: CARDIOLOGY | Facility: HOSPITAL | Age: 63
DRG: 38 | End: 2023-04-28
Payer: COMMERCIAL

## 2023-04-28 ENCOUNTER — ANESTHESIA EVENT (OUTPATIENT)
Dept: PERIOP | Facility: HOSPITAL | Age: 63
DRG: 38 | End: 2023-04-28
Payer: COMMERCIAL

## 2023-04-28 ENCOUNTER — ANESTHESIA (OUTPATIENT)
Dept: PERIOP | Facility: HOSPITAL | Age: 63
DRG: 38 | End: 2023-04-28
Payer: COMMERCIAL

## 2023-04-28 LAB
ALBUMIN SERPL-MCNC: 4.3 G/DL (ref 3.5–5.2)
ALBUMIN/GLOB SERPL: 1.5 G/DL
ALP SERPL-CCNC: 73 U/L (ref 39–117)
ALT SERPL W P-5'-P-CCNC: 10 U/L (ref 1–41)
ANION GAP SERPL CALCULATED.3IONS-SCNC: 10.4 MMOL/L (ref 5–15)
AORTIC DIMENSIONLESS INDEX: 0.8 (DI)
AST SERPL-CCNC: 15 U/L (ref 1–40)
BASOPHILS # BLD AUTO: 0.05 10*3/MM3 (ref 0–0.2)
BASOPHILS NFR BLD AUTO: 0.5 % (ref 0–1.5)
BH CV ECHO MEAS - AO MAX PG: 6.1 MMHG
BH CV ECHO MEAS - AO MEAN PG: 3.1 MMHG
BH CV ECHO MEAS - AO ROOT DIAM: 3.3 CM
BH CV ECHO MEAS - AO V2 MAX: 123.5 CM/SEC
BH CV ECHO MEAS - AO V2 VTI: 25.2 CM
BH CV ECHO MEAS - AVA(I,D): 3.2 CM2
BH CV ECHO MEAS - EDV(CUBED): 184.6 ML
BH CV ECHO MEAS - EDV(MOD-SP2): 155 ML
BH CV ECHO MEAS - EDV(MOD-SP4): 138 ML
BH CV ECHO MEAS - EF(MOD-BP): 59.1 %
BH CV ECHO MEAS - EF(MOD-SP2): 59.4 %
BH CV ECHO MEAS - EF(MOD-SP4): 60.9 %
BH CV ECHO MEAS - ESV(CUBED): 63.2 ML
BH CV ECHO MEAS - ESV(MOD-SP2): 63 ML
BH CV ECHO MEAS - ESV(MOD-SP4): 54 ML
BH CV ECHO MEAS - FS: 30 %
BH CV ECHO MEAS - IVS/LVPW: 0.96 CM
BH CV ECHO MEAS - IVSD: 0.78 CM
BH CV ECHO MEAS - LAT PEAK E' VEL: 7.5 CM/SEC
BH CV ECHO MEAS - LV DIASTOLIC VOL/BSA (35-75): 67.4 CM2
BH CV ECHO MEAS - LV MASS(C)D: 169.1 GRAMS
BH CV ECHO MEAS - LV MAX PG: 3.6 MMHG
BH CV ECHO MEAS - LV MEAN PG: 2.09 MMHG
BH CV ECHO MEAS - LV SYSTOLIC VOL/BSA (12-30): 26.4 CM2
BH CV ECHO MEAS - LV V1 MAX: 94.7 CM/SEC
BH CV ECHO MEAS - LV V1 VTI: 20.5 CM
BH CV ECHO MEAS - LVIDD: 5.7 CM
BH CV ECHO MEAS - LVIDS: 4 CM
BH CV ECHO MEAS - LVOT AREA: 4 CM2
BH CV ECHO MEAS - LVOT DIAM: 2.25 CM
BH CV ECHO MEAS - LVPWD: 0.81 CM
BH CV ECHO MEAS - MED PEAK E' VEL: 6 CM/SEC
BH CV ECHO MEAS - MR MAX PG: 56.3 MMHG
BH CV ECHO MEAS - MR MAX VEL: 375.1 CM/SEC
BH CV ECHO MEAS - MV A DUR: 0.09 SEC
BH CV ECHO MEAS - MV A MAX VEL: 106.3 CM/SEC
BH CV ECHO MEAS - MV DEC SLOPE: 447.3 CM/SEC2
BH CV ECHO MEAS - MV DEC TIME: 221 MSEC
BH CV ECHO MEAS - MV E MAX VEL: 84 CM/SEC
BH CV ECHO MEAS - MV E/A: 0.79
BH CV ECHO MEAS - MV MAX PG: 4.6 MMHG
BH CV ECHO MEAS - MV MEAN PG: 2.09 MMHG
BH CV ECHO MEAS - MV P1/2T: 68.4 MSEC
BH CV ECHO MEAS - MV V2 VTI: 22.6 CM
BH CV ECHO MEAS - MVA(P1/2T): 3.2 CM2
BH CV ECHO MEAS - MVA(VTI): 3.6 CM2
BH CV ECHO MEAS - PA ACC TIME: 0.11 SEC
BH CV ECHO MEAS - PA PR(ACCEL): 31.5 MMHG
BH CV ECHO MEAS - PA V2 MAX: 107.4 CM/SEC
BH CV ECHO MEAS - RAP SYSTOLE: 3 MMHG
BH CV ECHO MEAS - RV MAX PG: 1.28 MMHG
BH CV ECHO MEAS - RV V1 MAX: 56.6 CM/SEC
BH CV ECHO MEAS - RV V1 VTI: 12.5 CM
BH CV ECHO MEAS - SI(MOD-SP2): 44.9 ML/M2
BH CV ECHO MEAS - SI(MOD-SP4): 41 ML/M2
BH CV ECHO MEAS - SV(LVOT): 81.5 ML
BH CV ECHO MEAS - SV(MOD-SP2): 92 ML
BH CV ECHO MEAS - SV(MOD-SP4): 84 ML
BH CV ECHO MEAS - TAPSE (>1.6): 2.4 CM
BH CV ECHO MEASUREMENTS AVERAGE E/E' RATIO: 12.44
BH CV ECHO SHUNT ASSESSMENT PERFORMED (HIDDEN SCRIPTING): 1
BH CV XLRA - RV BASE: 3.6 CM
BH CV XLRA - TDI S': 10.7 CM/SEC
BH CV XLRA MEAS LEFT DIST CCA EDV: 27.5 CM/SEC
BH CV XLRA MEAS LEFT DIST CCA PSV: 97.9 CM/SEC
BH CV XLRA MEAS LEFT PROX ECA EDV: 4.69 CM/SEC
BH CV XLRA MEAS LEFT PROX ECA PSV: 63.5 CM/SEC
BH CV XLRA MEAS LEFT PROX ICA EDV: 56.4 CM/SEC
BH CV XLRA MEAS LEFT PROX ICA PSV: 113 CM/SEC
BILIRUB SERPL-MCNC: 0.4 MG/DL (ref 0–1.2)
BUN SERPL-MCNC: 13 MG/DL (ref 8–23)
BUN/CREAT SERPL: 14.3 (ref 7–25)
CALCIUM SPEC-SCNC: 9.7 MG/DL (ref 8.6–10.5)
CHLORIDE SERPL-SCNC: 102 MMOL/L (ref 98–107)
CO2 SERPL-SCNC: 24.6 MMOL/L (ref 22–29)
CREAT SERPL-MCNC: 0.91 MG/DL (ref 0.76–1.27)
DEPRECATED RDW RBC AUTO: 46.8 FL (ref 37–54)
EGFRCR SERPLBLD CKD-EPI 2021: 94.1 ML/MIN/1.73
EOSINOPHIL # BLD AUTO: 0.16 10*3/MM3 (ref 0–0.4)
EOSINOPHIL NFR BLD AUTO: 1.5 % (ref 0.3–6.2)
ERYTHROCYTE [DISTWIDTH] IN BLOOD BY AUTOMATED COUNT: 14.8 % (ref 12.3–15.4)
GLOBULIN UR ELPH-MCNC: 2.9 GM/DL
GLUCOSE BLDC GLUCOMTR-MCNC: 94 MG/DL (ref 70–130)
GLUCOSE BLDC GLUCOMTR-MCNC: 99 MG/DL (ref 70–130)
GLUCOSE SERPL-MCNC: 96 MG/DL (ref 65–99)
HCT VFR BLD AUTO: 38.2 % (ref 37.5–51)
HGB BLD-MCNC: 12.8 G/DL (ref 13–17.7)
IMM GRANULOCYTES # BLD AUTO: 0.04 10*3/MM3 (ref 0–0.05)
IMM GRANULOCYTES NFR BLD AUTO: 0.4 % (ref 0–0.5)
LEFT ATRIUM VOLUME INDEX: 22.9 ML/M2
LYMPHOCYTES # BLD AUTO: 2.35 10*3/MM3 (ref 0.7–3.1)
LYMPHOCYTES NFR BLD AUTO: 21.9 % (ref 19.6–45.3)
MAXIMAL PREDICTED HEART RATE: 156 BPM
MAXIMAL PREDICTED HEART RATE: 156 BPM
MCH RBC QN AUTO: 29.2 PG (ref 26.6–33)
MCHC RBC AUTO-ENTMCNC: 33.5 G/DL (ref 31.5–35.7)
MCV RBC AUTO: 87 FL (ref 79–97)
MONOCYTES # BLD AUTO: 0.71 10*3/MM3 (ref 0.1–0.9)
MONOCYTES NFR BLD AUTO: 6.6 % (ref 5–12)
NEUTROPHILS NFR BLD AUTO: 69.1 % (ref 42.7–76)
NEUTROPHILS NFR BLD AUTO: 7.4 10*3/MM3 (ref 1.7–7)
NRBC BLD AUTO-RTO: 0 /100 WBC (ref 0–0.2)
PLATELET # BLD AUTO: 311 10*3/MM3 (ref 140–450)
PMV BLD AUTO: 8.7 FL (ref 6–12)
POTASSIUM SERPL-SCNC: 4.5 MMOL/L (ref 3.5–5.2)
PROT SERPL-MCNC: 7.2 G/DL (ref 6–8.5)
RBC # BLD AUTO: 4.39 10*6/MM3 (ref 4.14–5.8)
SODIUM SERPL-SCNC: 137 MMOL/L (ref 136–145)
STRESS TARGET HR: 133 BPM
STRESS TARGET HR: 133 BPM
WBC NRBC COR # BLD: 10.71 10*3/MM3 (ref 3.4–10.8)

## 2023-04-28 PROCEDURE — 25010000002 PHENYLEPHRINE 10 MG/ML SOLUTION 5 ML VIAL: Performed by: NURSE ANESTHETIST, CERTIFIED REGISTERED

## 2023-04-28 PROCEDURE — C1768 GRAFT, VASCULAR: HCPCS | Performed by: SURGERY

## 2023-04-28 PROCEDURE — 25010000002 ONDANSETRON PER 1 MG: Performed by: ANESTHESIOLOGY

## 2023-04-28 PROCEDURE — 25010000002 SUGAMMADEX 200 MG/2ML SOLUTION: Performed by: ANESTHESIOLOGY

## 2023-04-28 PROCEDURE — 25010000002 FENTANYL CITRATE (PF) 50 MCG/ML SOLUTION: Performed by: NURSE ANESTHETIST, CERTIFIED REGISTERED

## 2023-04-28 PROCEDURE — 25010000002 HEPARIN (PORCINE) PER 1000 UNITS: Performed by: SURGERY

## 2023-04-28 PROCEDURE — 25010000002 FENTANYL CITRATE (PF) 50 MCG/ML SOLUTION: Performed by: ANESTHESIOLOGY

## 2023-04-28 PROCEDURE — 25010000002 MIDAZOLAM PER 1 MG: Performed by: ANESTHESIOLOGY

## 2023-04-28 PROCEDURE — 82948 REAGENT STRIP/BLOOD GLUCOSE: CPT

## 2023-04-28 PROCEDURE — 94799 UNLISTED PULMONARY SVC/PX: CPT

## 2023-04-28 PROCEDURE — 25010000002 CEFAZOLIN IN DEXTROSE 2-4 GM/100ML-% SOLUTION: Performed by: SURGERY

## 2023-04-28 PROCEDURE — 25010000002 HYDROMORPHONE PER 4 MG: Performed by: SURGERY

## 2023-04-28 PROCEDURE — 99233 SBSQ HOSP IP/OBS HIGH 50: CPT | Performed by: NURSE PRACTITIONER

## 2023-04-28 PROCEDURE — 85347 COAGULATION TIME ACTIVATED: CPT

## 2023-04-28 PROCEDURE — 25010000002 PROPOFOL 10 MG/ML EMULSION: Performed by: NURSE ANESTHETIST, CERTIFIED REGISTERED

## 2023-04-28 PROCEDURE — 03CN0ZZ EXTIRPATION OF MATTER FROM LEFT EXTERNAL CAROTID ARTERY, OPEN APPROACH: ICD-10-PCS | Performed by: SURGERY

## 2023-04-28 PROCEDURE — 93882 EXTRACRANIAL UNI/LTD STUDY: CPT

## 2023-04-28 PROCEDURE — 85025 COMPLETE CBC W/AUTO DIFF WBC: CPT | Performed by: INTERNAL MEDICINE

## 2023-04-28 PROCEDURE — 25010000002 DEXAMETHASONE SODIUM PHOSPHATE 20 MG/5ML SOLUTION: Performed by: NURSE ANESTHETIST, CERTIFIED REGISTERED

## 2023-04-28 PROCEDURE — 94640 AIRWAY INHALATION TREATMENT: CPT

## 2023-04-28 PROCEDURE — 03UL0KZ SUPPLEMENT LEFT INTERNAL CAROTID ARTERY WITH NONAUTOLOGOUS TISSUE SUBSTITUTE, OPEN APPROACH: ICD-10-PCS | Performed by: SURGERY

## 2023-04-28 PROCEDURE — 03CJ0ZZ EXTIRPATION OF MATTER FROM LEFT COMMON CAROTID ARTERY, OPEN APPROACH: ICD-10-PCS | Performed by: SURGERY

## 2023-04-28 PROCEDURE — 25010000002 PROTAMINE SULFATE PER 10 MG: Performed by: ANESTHESIOLOGY

## 2023-04-28 PROCEDURE — 25010000002 HEPARIN (PORCINE) PER 1000 UNITS: Performed by: ANESTHESIOLOGY

## 2023-04-28 PROCEDURE — 03UJ0KZ SUPPLEMENT LEFT COMMON CAROTID ARTERY WITH NONAUTOLOGOUS TISSUE SUBSTITUTE, OPEN APPROACH: ICD-10-PCS | Performed by: SURGERY

## 2023-04-28 PROCEDURE — 80053 COMPREHEN METABOLIC PANEL: CPT | Performed by: INTERNAL MEDICINE

## 2023-04-28 PROCEDURE — 25010000002 CEFAZOLIN PER 500 MG: Performed by: SURGERY

## 2023-04-28 PROCEDURE — 25010000002 PHENYLEPHRINE 10 MG/ML SOLUTION: Performed by: NURSE ANESTHETIST, CERTIFIED REGISTERED

## 2023-04-28 PROCEDURE — 03CL0ZZ EXTIRPATION OF MATTER FROM LEFT INTERNAL CAROTID ARTERY, OPEN APPROACH: ICD-10-PCS | Performed by: SURGERY

## 2023-04-28 DEVICE — FLOSEAL HEMOSTATIC MATRIX, 10ML
Type: IMPLANTABLE DEVICE | Site: NECK | Status: FUNCTIONAL
Brand: FLOSEAL HEMOSTATIC MATRIX

## 2023-04-28 DEVICE — VASCU-GUARD IS PREPARED FROM BOVINE PERICARDIUM CROSS-LINKED WITH GLUTARALDEHYDE, AND TREATED WITH 1 MOLAR SODIUM HYDROXIDE FOR A MINIMUM OF 60 MINUTES AT 20-25 DEGREES C. VASCU-GUARD IS TERMINALLY STERILIZED USING GAMMA IRRADIATION. AND PACKAGED WITHIN A DOUBLE-POUCH SYSTEM. THE CONTENTS OF THE UNOPENED, UNDAMAGED OUTER POUCH ARE STERILE.
Type: IMPLANTABLE DEVICE | Site: NECK | Status: FUNCTIONAL
Brand: VASCU-GUARD

## 2023-04-28 RX ORDER — ONDANSETRON 4 MG/1
4 TABLET, FILM COATED ORAL EVERY 6 HOURS PRN
Status: DISCONTINUED | OUTPATIENT
Start: 2023-04-28 | End: 2023-04-29 | Stop reason: HOSPADM

## 2023-04-28 RX ORDER — HYDROCODONE BITARTRATE AND ACETAMINOPHEN 7.5; 325 MG/1; MG/1
1 TABLET ORAL ONCE AS NEEDED
Status: DISCONTINUED | OUTPATIENT
Start: 2023-04-28 | End: 2023-04-28 | Stop reason: HOSPADM

## 2023-04-28 RX ORDER — NITROGLYCERIN 0.4 MG/1
0.4 TABLET SUBLINGUAL
Status: DISCONTINUED | OUTPATIENT
Start: 2023-04-28 | End: 2023-04-29 | Stop reason: HOSPADM

## 2023-04-28 RX ORDER — SODIUM CHLORIDE 0.9 % (FLUSH) 0.9 %
3 SYRINGE (ML) INJECTION EVERY 12 HOURS SCHEDULED
Status: DISCONTINUED | OUTPATIENT
Start: 2023-04-28 | End: 2023-04-28 | Stop reason: HOSPADM

## 2023-04-28 RX ORDER — ONDANSETRON 2 MG/ML
INJECTION INTRAMUSCULAR; INTRAVENOUS AS NEEDED
Status: DISCONTINUED | OUTPATIENT
Start: 2023-04-28 | End: 2023-04-28 | Stop reason: SURG

## 2023-04-28 RX ORDER — EPHEDRINE SULFATE 50 MG/ML
5 INJECTION, SOLUTION INTRAVENOUS ONCE AS NEEDED
Status: DISCONTINUED | OUTPATIENT
Start: 2023-04-28 | End: 2023-04-28 | Stop reason: HOSPADM

## 2023-04-28 RX ORDER — CEFAZOLIN SODIUM 2 G/100ML
2 INJECTION, SOLUTION INTRAVENOUS EVERY 8 HOURS
Status: COMPLETED | OUTPATIENT
Start: 2023-04-28 | End: 2023-04-29

## 2023-04-28 RX ORDER — DEXAMETHASONE SODIUM PHOSPHATE 4 MG/ML
INJECTION, SOLUTION INTRA-ARTICULAR; INTRALESIONAL; INTRAMUSCULAR; INTRAVENOUS; SOFT TISSUE AS NEEDED
Status: DISCONTINUED | OUTPATIENT
Start: 2023-04-28 | End: 2023-04-28 | Stop reason: SURG

## 2023-04-28 RX ORDER — FENTANYL CITRATE 50 UG/ML
50 INJECTION, SOLUTION INTRAMUSCULAR; INTRAVENOUS ONCE AS NEEDED
Status: DISCONTINUED | OUTPATIENT
Start: 2023-04-28 | End: 2023-04-28 | Stop reason: HOSPADM

## 2023-04-28 RX ORDER — PHENYLEPHRINE HYDROCHLORIDE 10 MG/ML
INJECTION INTRAVENOUS AS NEEDED
Status: DISCONTINUED | OUTPATIENT
Start: 2023-04-28 | End: 2023-04-28 | Stop reason: SURG

## 2023-04-28 RX ORDER — PROPOFOL 10 MG/ML
VIAL (ML) INTRAVENOUS AS NEEDED
Status: DISCONTINUED | OUTPATIENT
Start: 2023-04-28 | End: 2023-04-28 | Stop reason: SURG

## 2023-04-28 RX ORDER — HYDROMORPHONE HYDROCHLORIDE 1 MG/ML
0.5 INJECTION, SOLUTION INTRAMUSCULAR; INTRAVENOUS; SUBCUTANEOUS
Status: DISCONTINUED | OUTPATIENT
Start: 2023-04-28 | End: 2023-04-29 | Stop reason: HOSPADM

## 2023-04-28 RX ORDER — HYDROMORPHONE HYDROCHLORIDE 1 MG/ML
0.5 INJECTION, SOLUTION INTRAMUSCULAR; INTRAVENOUS; SUBCUTANEOUS
Status: DISCONTINUED | OUTPATIENT
Start: 2023-04-28 | End: 2023-04-28 | Stop reason: HOSPADM

## 2023-04-28 RX ORDER — SODIUM CHLORIDE 9 MG/ML
125 INJECTION, SOLUTION INTRAVENOUS CONTINUOUS
Status: DISCONTINUED | OUTPATIENT
Start: 2023-04-28 | End: 2023-04-29 | Stop reason: HOSPADM

## 2023-04-28 RX ORDER — CEFAZOLIN SODIUM 2 G/100ML
2 INJECTION, SOLUTION INTRAVENOUS EVERY 8 HOURS
Status: DISCONTINUED | OUTPATIENT
Start: 2023-04-28 | End: 2023-04-28

## 2023-04-28 RX ORDER — NALOXONE HCL 0.4 MG/ML
0.2 VIAL (ML) INJECTION AS NEEDED
Status: DISCONTINUED | OUTPATIENT
Start: 2023-04-28 | End: 2023-04-28 | Stop reason: HOSPADM

## 2023-04-28 RX ORDER — FAMOTIDINE 10 MG/ML
20 INJECTION, SOLUTION INTRAVENOUS ONCE
Status: DISCONTINUED | OUTPATIENT
Start: 2023-04-28 | End: 2023-04-28 | Stop reason: HOSPADM

## 2023-04-28 RX ORDER — FLUMAZENIL 0.1 MG/ML
0.2 INJECTION INTRAVENOUS AS NEEDED
Status: DISCONTINUED | OUTPATIENT
Start: 2023-04-28 | End: 2023-04-28 | Stop reason: HOSPADM

## 2023-04-28 RX ORDER — FENTANYL CITRATE 50 UG/ML
INJECTION, SOLUTION INTRAMUSCULAR; INTRAVENOUS AS NEEDED
Status: DISCONTINUED | OUTPATIENT
Start: 2023-04-28 | End: 2023-04-28 | Stop reason: SURG

## 2023-04-28 RX ORDER — LIDOCAINE HYDROCHLORIDE 10 MG/ML
0.5 INJECTION, SOLUTION EPIDURAL; INFILTRATION; INTRACAUDAL; PERINEURAL ONCE AS NEEDED
Status: DISCONTINUED | OUTPATIENT
Start: 2023-04-28 | End: 2023-04-28 | Stop reason: HOSPADM

## 2023-04-28 RX ORDER — HYDROCODONE BITARTRATE AND ACETAMINOPHEN 7.5; 325 MG/1; MG/1
1 TABLET ORAL EVERY 4 HOURS PRN
Status: DISCONTINUED | OUTPATIENT
Start: 2023-04-28 | End: 2023-04-29 | Stop reason: HOSPADM

## 2023-04-28 RX ORDER — ESMOLOL HYDROCHLORIDE 10 MG/ML
INJECTION INTRAVENOUS AS NEEDED
Status: DISCONTINUED | OUTPATIENT
Start: 2023-04-28 | End: 2023-04-28 | Stop reason: SURG

## 2023-04-28 RX ORDER — ONDANSETRON 2 MG/ML
4 INJECTION INTRAMUSCULAR; INTRAVENOUS ONCE AS NEEDED
Status: DISCONTINUED | OUTPATIENT
Start: 2023-04-28 | End: 2023-04-28 | Stop reason: HOSPADM

## 2023-04-28 RX ORDER — GLYCOPYRROLATE 0.2 MG/ML
INJECTION INTRAMUSCULAR; INTRAVENOUS AS NEEDED
Status: DISCONTINUED | OUTPATIENT
Start: 2023-04-28 | End: 2023-04-28 | Stop reason: SURG

## 2023-04-28 RX ORDER — DEXMEDETOMIDINE HYDROCHLORIDE 100 UG/ML
INJECTION, SOLUTION INTRAVENOUS AS NEEDED
Status: DISCONTINUED | OUTPATIENT
Start: 2023-04-28 | End: 2023-04-28

## 2023-04-28 RX ORDER — LABETALOL HYDROCHLORIDE 5 MG/ML
5 INJECTION, SOLUTION INTRAVENOUS
Status: DISCONTINUED | OUTPATIENT
Start: 2023-04-28 | End: 2023-04-28 | Stop reason: HOSPADM

## 2023-04-28 RX ORDER — FENTANYL CITRATE 50 UG/ML
INJECTION, SOLUTION INTRAMUSCULAR; INTRAVENOUS
Status: COMPLETED | OUTPATIENT
Start: 2023-04-28 | End: 2023-04-28

## 2023-04-28 RX ORDER — SODIUM CHLORIDE 0.9 % (FLUSH) 0.9 %
3-10 SYRINGE (ML) INJECTION AS NEEDED
Status: DISCONTINUED | OUTPATIENT
Start: 2023-04-28 | End: 2023-04-28 | Stop reason: HOSPADM

## 2023-04-28 RX ORDER — HYDRALAZINE HYDROCHLORIDE 20 MG/ML
5 INJECTION INTRAMUSCULAR; INTRAVENOUS
Status: DISCONTINUED | OUTPATIENT
Start: 2023-04-28 | End: 2023-04-28 | Stop reason: HOSPADM

## 2023-04-28 RX ORDER — DIPHENHYDRAMINE HYDROCHLORIDE 50 MG/ML
12.5 INJECTION INTRAMUSCULAR; INTRAVENOUS
Status: DISCONTINUED | OUTPATIENT
Start: 2023-04-28 | End: 2023-04-28 | Stop reason: HOSPADM

## 2023-04-28 RX ORDER — DROPERIDOL 2.5 MG/ML
0.62 INJECTION, SOLUTION INTRAMUSCULAR; INTRAVENOUS
Status: DISCONTINUED | OUTPATIENT
Start: 2023-04-28 | End: 2023-04-28 | Stop reason: HOSPADM

## 2023-04-28 RX ORDER — LIDOCAINE HYDROCHLORIDE 20 MG/ML
INJECTION, SOLUTION INFILTRATION; PERINEURAL AS NEEDED
Status: DISCONTINUED | OUTPATIENT
Start: 2023-04-28 | End: 2023-04-28 | Stop reason: SURG

## 2023-04-28 RX ORDER — ONDANSETRON 2 MG/ML
4 INJECTION INTRAMUSCULAR; INTRAVENOUS EVERY 6 HOURS PRN
Status: DISCONTINUED | OUTPATIENT
Start: 2023-04-28 | End: 2023-04-29 | Stop reason: HOSPADM

## 2023-04-28 RX ORDER — IPRATROPIUM BROMIDE AND ALBUTEROL SULFATE 2.5; .5 MG/3ML; MG/3ML
3 SOLUTION RESPIRATORY (INHALATION) ONCE AS NEEDED
Status: DISCONTINUED | OUTPATIENT
Start: 2023-04-28 | End: 2023-04-28 | Stop reason: HOSPADM

## 2023-04-28 RX ORDER — PROMETHAZINE HYDROCHLORIDE 25 MG/1
25 SUPPOSITORY RECTAL ONCE AS NEEDED
Status: DISCONTINUED | OUTPATIENT
Start: 2023-04-28 | End: 2023-04-28 | Stop reason: HOSPADM

## 2023-04-28 RX ORDER — HEPARIN SODIUM 1000 [USP'U]/ML
INJECTION, SOLUTION INTRAVENOUS; SUBCUTANEOUS AS NEEDED
Status: DISCONTINUED | OUTPATIENT
Start: 2023-04-28 | End: 2023-04-28 | Stop reason: SURG

## 2023-04-28 RX ORDER — MIDAZOLAM HYDROCHLORIDE 1 MG/ML
INJECTION INTRAMUSCULAR; INTRAVENOUS
Status: COMPLETED | OUTPATIENT
Start: 2023-04-28 | End: 2023-04-28

## 2023-04-28 RX ORDER — MIDAZOLAM HYDROCHLORIDE 1 MG/ML
1 INJECTION INTRAMUSCULAR; INTRAVENOUS
Status: DISCONTINUED | OUTPATIENT
Start: 2023-04-28 | End: 2023-04-28 | Stop reason: HOSPADM

## 2023-04-28 RX ORDER — ASPIRIN 81 MG/1
81 TABLET, CHEWABLE ORAL DAILY
Status: DISCONTINUED | OUTPATIENT
Start: 2023-04-29 | End: 2023-04-29 | Stop reason: HOSPADM

## 2023-04-28 RX ORDER — PROMETHAZINE HYDROCHLORIDE 25 MG/1
25 TABLET ORAL ONCE AS NEEDED
Status: DISCONTINUED | OUTPATIENT
Start: 2023-04-28 | End: 2023-04-28 | Stop reason: HOSPADM

## 2023-04-28 RX ORDER — SODIUM CHLORIDE 9 MG/ML
100 INJECTION, SOLUTION INTRAVENOUS CONTINUOUS
Status: DISCONTINUED | OUTPATIENT
Start: 2023-04-28 | End: 2023-04-28

## 2023-04-28 RX ORDER — IPRATROPIUM BROMIDE AND ALBUTEROL SULFATE 2.5; .5 MG/3ML; MG/3ML
3 SOLUTION RESPIRATORY (INHALATION)
Status: DISCONTINUED | OUTPATIENT
Start: 2023-04-28 | End: 2023-04-29 | Stop reason: HOSPADM

## 2023-04-28 RX ORDER — ROCURONIUM BROMIDE 10 MG/ML
INJECTION, SOLUTION INTRAVENOUS AS NEEDED
Status: DISCONTINUED | OUTPATIENT
Start: 2023-04-28 | End: 2023-04-28 | Stop reason: SURG

## 2023-04-28 RX ORDER — OXYCODONE AND ACETAMINOPHEN 7.5; 325 MG/1; MG/1
1 TABLET ORAL EVERY 4 HOURS PRN
Status: DISCONTINUED | OUTPATIENT
Start: 2023-04-28 | End: 2023-04-28 | Stop reason: HOSPADM

## 2023-04-28 RX ORDER — NALOXONE HCL 0.4 MG/ML
0.4 VIAL (ML) INJECTION
Status: DISCONTINUED | OUTPATIENT
Start: 2023-04-28 | End: 2023-04-29 | Stop reason: HOSPADM

## 2023-04-28 RX ORDER — SODIUM CHLORIDE, SODIUM LACTATE, POTASSIUM CHLORIDE, CALCIUM CHLORIDE 600; 310; 30; 20 MG/100ML; MG/100ML; MG/100ML; MG/100ML
9 INJECTION, SOLUTION INTRAVENOUS CONTINUOUS
Status: DISCONTINUED | OUTPATIENT
Start: 2023-04-28 | End: 2023-04-29 | Stop reason: HOSPADM

## 2023-04-28 RX ORDER — PROTAMINE SULFATE 10 MG/ML
INJECTION, SOLUTION INTRAVENOUS AS NEEDED
Status: DISCONTINUED | OUTPATIENT
Start: 2023-04-28 | End: 2023-04-28 | Stop reason: SURG

## 2023-04-28 RX ORDER — CEFAZOLIN SODIUM 2 G/100ML
2 INJECTION, SOLUTION INTRAVENOUS ONCE
Status: COMPLETED | OUTPATIENT
Start: 2023-04-28 | End: 2023-04-28

## 2023-04-28 RX ORDER — FENTANYL CITRATE 50 UG/ML
50 INJECTION, SOLUTION INTRAMUSCULAR; INTRAVENOUS
Status: DISCONTINUED | OUTPATIENT
Start: 2023-04-28 | End: 2023-04-28 | Stop reason: HOSPADM

## 2023-04-28 RX ADMIN — PHENYLEPHRINE HYDROCHLORIDE 0.5 MCG/KG/MIN: 10 INJECTION INTRAVENOUS at 15:52

## 2023-04-28 RX ADMIN — ESMOLOL HYDROCHLORIDE 10 MG: 100 INJECTION, SOLUTION INTRAVENOUS at 15:36

## 2023-04-28 RX ADMIN — PHENYLEPHRINE HYDROCHLORIDE 100 MCG: 50 INJECTION INTRAVENOUS at 15:45

## 2023-04-28 RX ADMIN — LIDOCAINE HYDROCHLORIDE 100 MG: 20 INJECTION, SOLUTION INFILTRATION; PERINEURAL at 15:31

## 2023-04-28 RX ADMIN — ROCURONIUM BROMIDE 50 MG: 10 INJECTION, SOLUTION INTRAVENOUS at 15:31

## 2023-04-28 RX ADMIN — PROPOFOL 200 MG: 10 INJECTION, EMULSION INTRAVENOUS at 15:31

## 2023-04-28 RX ADMIN — PROPOFOL 50 MG: 10 INJECTION, EMULSION INTRAVENOUS at 15:48

## 2023-04-28 RX ADMIN — FENTANYL CITRATE 50 MCG: 50 INJECTION, SOLUTION INTRAMUSCULAR; INTRAVENOUS at 18:28

## 2023-04-28 RX ADMIN — HYDROCODONE BITARTRATE AND ACETAMINOPHEN 1 TABLET: 10; 325 TABLET ORAL at 12:01

## 2023-04-28 RX ADMIN — HYDROCODONE BITARTRATE AND ACETAMINOPHEN 1 TABLET: 10; 325 TABLET ORAL at 18:48

## 2023-04-28 RX ADMIN — FENTANYL CITRATE 50 MCG: 50 INJECTION, SOLUTION INTRAMUSCULAR; INTRAVENOUS at 14:20

## 2023-04-28 RX ADMIN — ATORVASTATIN CALCIUM 80 MG: 80 TABLET, FILM COATED ORAL at 21:13

## 2023-04-28 RX ADMIN — CEFAZOLIN SODIUM 2 G: 2 INJECTION, SOLUTION INTRAVENOUS at 15:17

## 2023-04-28 RX ADMIN — HYDROCODONE BITARTRATE AND ACETAMINOPHEN 1 TABLET: 10; 325 TABLET ORAL at 08:09

## 2023-04-28 RX ADMIN — CEFAZOLIN SODIUM 2 G: 2 INJECTION, SOLUTION INTRAVENOUS at 22:53

## 2023-04-28 RX ADMIN — SODIUM CHLORIDE, POTASSIUM CHLORIDE, SODIUM LACTATE AND CALCIUM CHLORIDE: 600; 310; 30; 20 INJECTION, SOLUTION INTRAVENOUS at 15:23

## 2023-04-28 RX ADMIN — ONDANSETRON 4 MG: 2 INJECTION INTRAMUSCULAR; INTRAVENOUS at 17:05

## 2023-04-28 RX ADMIN — ROCURONIUM BROMIDE 20 MG: 10 INJECTION, SOLUTION INTRAVENOUS at 17:14

## 2023-04-28 RX ADMIN — FENTANYL CITRATE 50 MCG: 50 INJECTION, SOLUTION INTRAMUSCULAR; INTRAVENOUS at 17:18

## 2023-04-28 RX ADMIN — DEXAMETHASONE SODIUM PHOSPHATE 8 MG: 4 INJECTION, SOLUTION INTRAMUSCULAR; INTRAVENOUS at 15:44

## 2023-04-28 RX ADMIN — PROTAMINE SULFATE 40 MG: 10 INJECTION, SOLUTION INTRAVENOUS at 17:09

## 2023-04-28 RX ADMIN — FENTANYL CITRATE 50 MCG: 50 INJECTION, SOLUTION INTRAMUSCULAR; INTRAVENOUS at 15:48

## 2023-04-28 RX ADMIN — SODIUM CHLORIDE, POTASSIUM CHLORIDE, SODIUM LACTATE AND CALCIUM CHLORIDE: 600; 310; 30; 20 INJECTION, SOLUTION INTRAVENOUS at 16:46

## 2023-04-28 RX ADMIN — GLYCOPYRROLATE 0.2 MG: 0.2 INJECTION INTRAMUSCULAR; INTRAVENOUS at 16:30

## 2023-04-28 RX ADMIN — ROCURONIUM BROMIDE 20 MG: 10 INJECTION, SOLUTION INTRAVENOUS at 16:19

## 2023-04-28 RX ADMIN — HYDROCODONE BITARTRATE AND ACETAMINOPHEN 1 TABLET: 10; 325 TABLET ORAL at 04:17

## 2023-04-28 RX ADMIN — MIDAZOLAM 1 MG: 1 INJECTION INTRAMUSCULAR; INTRAVENOUS at 14:20

## 2023-04-28 RX ADMIN — SODIUM CHLORIDE 125 ML/HR: 9 INJECTION, SOLUTION INTRAVENOUS at 18:51

## 2023-04-28 RX ADMIN — ASPIRIN 325 MG: 325 TABLET ORAL at 08:08

## 2023-04-28 RX ADMIN — SUGAMMADEX 200 MG: 100 INJECTION, SOLUTION INTRAVENOUS at 17:25

## 2023-04-28 RX ADMIN — HYDROMORPHONE HYDROCHLORIDE 0.5 MG: 1 INJECTION, SOLUTION INTRAMUSCULAR; INTRAVENOUS; SUBCUTANEOUS at 21:10

## 2023-04-28 RX ADMIN — IPRATROPIUM BROMIDE AND ALBUTEROL SULFATE 3 ML: .5; 3 SOLUTION RESPIRATORY (INHALATION) at 22:50

## 2023-04-28 RX ADMIN — DEXMEDETOMIDINE 0.4 MCG/KG/HR: 100 INJECTION, SOLUTION, CONCENTRATE INTRAVENOUS at 16:15

## 2023-04-28 RX ADMIN — HEPARIN SODIUM 10000 UNITS: 1000 INJECTION INTRAVENOUS; SUBCUTANEOUS at 16:11

## 2023-04-28 RX ADMIN — FENTANYL CITRATE 50 MCG: 50 INJECTION, SOLUTION INTRAMUSCULAR; INTRAVENOUS at 15:27

## 2023-04-28 RX ADMIN — PHENYLEPHRINE HYDROCHLORIDE 100 MCG: 50 INJECTION INTRAVENOUS at 15:52

## 2023-04-28 NOTE — ANESTHESIA PROCEDURE NOTES
Airway  Urgency: elective    Date/Time: 4/28/2023 3:34 PM  Airway not difficult    General Information and Staff    Patient location during procedure: OR  Anesthesiologist: Vu Chamberlain MD  CRNA/CAA: Anselmo Lopes CRNA    Indications and Patient Condition  Indications for airway management: airway protection    Preoxygenated: yes  MILS maintained throughout      Final Airway Details  Final airway type: endotracheal airway      Successful airway: ETT  Cuffed: yes   Successful intubation technique: direct laryngoscopy  Facilitating devices/methods: intubating stylet  Endotracheal tube insertion site: oral  Blade: Lerner  Blade size: 2  ETT size (mm): 7.5  Cormack-Lehane Classification: grade I - full view of glottis  Placement verified by: chest auscultation and capnometry   Cuff volume (mL): 8  Measured from: lips  ETT/EBT  to lips (cm): 21  Number of attempts at approach: 1  Assessment: lips, teeth, and gum same as pre-op and atraumatic intubation

## 2023-04-28 NOTE — PROGRESS NOTES
DOS: 2023  NAME: Rito Arnold   : 1960  PCP: Provider, No Known    Chief Complaint   Patient presents with   • Eye Problem        Stroke    Subjective: Pt seen in follow up, however the problem is new to me.  Patient lying in bed with family member at bedside.  He states he feels okay.  He states his vision is fine.  Denies any new weakness, numbness, speech or visual disturbances, or headaches.  Left CEA planned for today.    Objective:  Vital signs:      Vitals:    23 2345 23 0700 23 1100 23 1321   BP: 150/84 153/91 135/76 155/86   BP Location:  Left arm Left arm Left arm   Patient Position:  Lying Lying Lying   Pulse: 78 75 79    Resp:  16 16    Temp:  98 °F (36.7 °C) 97.8 °F (36.6 °C)    TempSrc:  Oral Oral    SpO2: 92% 96% 94% 95%   Weight:       Height:           Current Facility-Administered Medications:   •  [MAR Hold] acetaminophen (TYLENOL) tablet 650 mg, 650 mg, Oral, Q4H PRN **OR** [MAR Hold] acetaminophen (TYLENOL) suppository 650 mg, 650 mg, Rectal, Q4H PRN, Mariangel Wolfe APRN  •  [MAR Hold] aspirin tablet 325 mg, 325 mg, Oral, Daily, 325 mg at 23 0808 **OR** [MAR Hold] aspirin suppository 300 mg, 300 mg, Rectal, Daily, Mariangel Wolfe APRN  •  [MAR Hold] atorvastatin (LIPITOR) tablet 80 mg, 80 mg, Oral, Nightly, Mariangel Wolfe APRN, 80 mg at 23 2040  •  [MAR Hold] bisacodyl (DULCOLAX) suppository 10 mg, 10 mg, Rectal, Daily PRN, Mariangel Wolfe APRN  •  ceFAZolin in dextrose (ANCEF) IVPB solution 2 g, 2 g, Intravenous, Once, Seun Herrera MD  •  gabapentin (NEURONTIN) capsule 600 mg, 600 mg, Oral, Daily, Mariangel Wolfe APRN  •  [MAR Hold] HYDROcodone-acetaminophen (NORCO)  MG per tablet 1 tablet, 1 tablet, Oral, Q4H PRN, Mariangel Wolfe, FRANCISCO JAVIER, 1 tablet at 23 1201  •  [MAR Hold] ondansetron (ZOFRAN) injection 4 mg, 4 mg, Intravenous, Q6H PRN, Mariangel Wolfe APRN  •  [COMPLETED] Insert  Peripheral IV, , , Once **AND** [MAR Hold] sodium chloride 0.9 % flush 10 mL, 10 mL, Intravenous, PRN, Virgil Fierro MD    PRN meds  •  [MAR Hold] acetaminophen **OR** [MAR Hold] acetaminophen  •  [MAR Hold] bisacodyl  •  [MAR Hold] HYDROcodone-acetaminophen  •  [MAR Hold] ondansetron  •  [COMPLETED] Insert Peripheral IV **AND** [MAR Hold] sodium chloride    No current facility-administered medications on file prior to encounter.     Current Outpatient Medications on File Prior to Encounter   Medication Sig   • gabapentin (NEURONTIN) 600 MG tablet Daily.   • HYDROcodone-acetaminophen (NORCO)  MG per tablet hydrocodone 10 mg-acetaminophen 325 mg tablet       General appearance: Elderly male, NAD, alert and cooperative, well groomed  HEENT: Normocephalic, atraumatic, no masses or tenderness  Resp: Even and unlabored  Extremities: No obvious edema  Skin: warm, dry    Neurological:   MS: oriented x3, normal attention/concentration, language intact, no neglect, normal fund of knowledge  CN: visual fields full on exam although pt subjectively reports partial vision loss of left eye, EOMI, facial sensation equal, no facial droop, tongue midline  Motor: 5/5 in all 4 ext., normal tone  Sensory: light touch and cold sensation intact in all 4 ext.  Coordination: Normal finger to nose test  Gait and station: no ataxia  Rapid alternating movements: normal finger to thumb tap    Laboratory results:  Lab Results   Component Value Date    TSH 1.460 04/27/2023     Lab Results   Component Value Date    HGBA1C 5.50 04/26/2023     No results found for: [id number]  Lab Results   Component Value Date    CHOL 199 04/27/2023     Lab Results   Component Value Date    TRIG 90 04/27/2023     Lab Results   Component Value Date    HDL 50 04/27/2023     Lab Results   Component Value Date     (H) 04/27/2023     Lab Results   Component Value Date    WBC 10.71 04/28/2023    HGB 12.8 (L) 04/28/2023    HCT 38.2 04/28/2023    MCV 87.0  04/28/2023     04/28/2023     Lab Results   Component Value Date    GLUCOSE 96 04/28/2023    BUN 13 04/28/2023    CREATININE 0.91 04/28/2023    BCR 14.3 04/28/2023    K 4.5 04/28/2023    CO2 24.6 04/28/2023    CALCIUM 9.7 04/28/2023    ALBUMIN 4.3 04/28/2023    AST 15 04/28/2023    ALT 10 04/28/2023     Lab Results   Component Value Date    PTT 26.8 04/26/2023     Lab Results   Component Value Date    INR 1.04 04/26/2023    PROTIME 13.7 04/26/2023     Brief Urine Lab Results     None          Review and interpretation of imaging:  CT Angiogram Neck    Result Date: 4/26/2023  Electronically signed by Stephanie Guzman MD on 04-26-23 at 2303    MRI Brain Without Contrast    Result Date: 4/27/2023  1. No acute intracranial abnormality is identified to account for the patient's acute onset visual disturbance. 2. There are several tiny nodular foci T2 high signal in the frontal white matter that is felt to likely be idiopathic and of no significance. 3. Mild bilateral medial frontal sinus and ethmoid sinus inferior left maxillary sinus and right sphenoid sinus mucosal thickening. There is a small amount of fluid in the mastoid air cells bilaterally. 4. There is marrow edema in the right mandibular head and neck that is felt to be degenerative marrow edema from arthritic change in the right temporomandibular joint. 4. The remainder of MRI of the brain is normal. Specifically, no acute infarct is seen. The etiology of patient's acute onset visual disturbance is not established on this exam.  This report was finalized on 4/27/2023 6:13 PM by Dr. Breezy Cummings M.D.      CT Angiogram Head    Result Date: 4/26/2023  Electronically signed by Stephanie Gzuman MD on 04-26-23 at 2255    Results for orders placed during the hospital encounter of 04/26/23    Adult transthoracic echo complete    Interpretation Summary  •  The agitated saline study is positive, consistent with a small PFO  •  The left ventricular cavity is mildly  dilated.  •  Left ventricular systolic function is normal. Left ventricular ejection fraction appears to be 56 - 60%.  •  Left ventricular diastolic function is consistent with (grade I) impaired relaxation.  •  Normal right ventricular cavity size and systolic function noted.  •  The left atrial cavity is mildly dilated.  •  There is no evidence of pericardial effusion      Impression/Assessment:  This is a 62-year-old male with past medical history of tobacco abuse (1.5 packs/day) who presented to the hospital on 4/26/2023 with complaints of loss of vision in the left eye for a little over a week period he presented initially to his ophthalmologist and was told that he had plaque in his retinal artery and needed to go to the ER for stroke work-up.  He underwent a CTA head and neck that revealed approximately 70% stenosis of the proximal right and left ICA.  MRI brain without did not reveal any evidence of restricted diffusion.    BP on arrival 156/93, .  EKG with predominantly NSR.  .  Temp 98.3.    Diagnosis:  Left central retinal artery occlusion, atheroembolic secondary to left ICA atherosclerosis  Bilateral carotid stenosis, left greater than right  Tobacco abuse  Dyslipidemia  Small PFO    Additional work up to date:  Carotid duplex: Modest atherosclerosis in the proximal R ICA with less than 50% ICA stenosis, large amount of homogeneous atherosclerotic plaque in the carotid bulb which is dilated but velocities indicate less than 50% ICA stenosis  2D Echo: LA cavity mildly dilated, agitated saline study positive consistent with a small PFO, EF 56 to 60%, no evidence of LV thrombus, all LV wall segments contract normally, no AV stenosis, trace to mild MVR.    Plan:  Vision remains unchanged.  No new focal deficits overnight or this morning.  Plans for left CEA today by Dr. Quiros.  Continue ASA, will likely continue this post procedure.  Lipitor 80 mg,   Neurochecks per stroke protocol  BP  parameters per vascular surgery postoperatively, long-term goal 130/80 or less.  Needs to quit smoking completely.  Small PFO noted on echo, can follow-up with cardiology in the outpatient setting.  Stroke Education  ELIZABETH/SCDs  PT/OT/ST  Will arrange follow-up in our office in 3 months for stroke follow-up.  Therapies as written. CCP for discharge planning. Call RRT for any acute neurological changes. We will see him tomorrow after his procedure.    Case discussed with patient, family member at bedside, and Dr. Rodriguez, and he agrees with plan above.     FRANCISCO JAVIER Paula

## 2023-04-28 NOTE — PROGRESS NOTES
Name: Rito Arnold ADMIT: 2023   : 1960  PCP: Provider, No Known    MRN: [id number] LOS: 1 days   AGE/SEX: 62 y.o. male  ROOM: [facility]     Subjective   Subjective   Patient seen at bedside.       Objective   Objective   Vital Signs  Temp:  [97.7 °F (36.5 °C)-98.4 °F (36.9 °C)] 97.8 °F (36.6 °C)  Heart Rate:  [75-86] 79  Resp:  [16-20] 16  BP: (135-172)/(76-98) 155/86  SpO2:  [92 %-96 %] 95 %  on   ;   Device (Oxygen Therapy): room air  Body mass index is 27.41 kg/m².  Physical Exam   General, awake and alert.  Head and ENT, normocephalic and atraumatic.  Lungs, symmetric expansion, equal air entry bilaterally.  Heart, regular rate and rhythm.  Abdomen, soft and nontender.  Extremities, no clubbing or cyanosis.  Neuro, no focal deficits.  Skin: Warm and no rash.  Psych, normal mood and affect.  Musculoskeletal, joint examination is grossly normal.      Results Review     I reviewed the patient's new clinical results.  Results from last 7 days   Lab Units 23  0709 23   WBC 10*3/mm3 10.71 8.17 9.16   HEMOGLOBIN g/dL 12.8* 11.9* 11.6*   PLATELETS 10*3/mm3 311 273 260     Results from last 7 days   Lab Units 23  0709 23  0923   SODIUM mmol/L 137 139 143   POTASSIUM mmol/L 4.5 4.0 4.0   CHLORIDE mmol/L 102 105 106   CO2 mmol/L 24.6 26.5 25.6   BUN mg/dL 13 10 14   CREATININE mg/dL 0.91 0.93 0.93   GLUCOSE mg/dL 96 128* 122*   EGFR mL/min/1.73 94.1 91.7 91.7     Results from last 7 days   Lab Units 23  0709 23  0923   ALBUMIN g/dL 4.3 3.9 4.1   BILIRUBIN mg/dL 0.4 0.4 0.3   ALK PHOS U/L 73 70 67   AST (SGOT) U/L 15 13 12   ALT (SGPT) U/L 10 12 8     Results from last 7 days   Lab Units 23  0709 23  0921 23   CALCIUM mg/dL 9.7 9.5 9.4   ALBUMIN g/dL 4.3 3.9 4.1       Hemoglobin A1C   Date/Time Value Ref Range Status   2023 5.50 4.80 - 5.60 % Final     Glucose   Date/Time Value  Ref Range Status   04/28/2023 1103 99 70 - 130 mg/dL Final     Comment:     Meter: [id number] : [id number] Yann Mart NA   04/28/2023 0612 94 70 - 130 mg/dL Final     Comment:     Meter: [id number] : [id number] Bishop Saunders NA   04/27/2023 0603 100 70 - 130 mg/dL Final     Comment:     Meter: [id number] : [id number] Samir Rousseau NA       CT Angiogram Neck    Result Date: 4/26/2023  Electronically signed by Stephanie Guzman MD on 04-26-23 at 2303    MRI Brain Without Contrast    Result Date: 4/27/2023  1. No acute intracranial abnormality is identified to account for the patient's acute onset visual disturbance. 2. There are several tiny nodular foci T2 high signal in the frontal white matter that is felt to likely be idiopathic and of no significance. 3. Mild bilateral medial frontal sinus and ethmoid sinus inferior left maxillary sinus and right sphenoid sinus mucosal thickening. There is a small amount of fluid in the mastoid air cells bilaterally. 4. There is marrow edema in the right mandibular head and neck that is felt to be degenerative marrow edema from arthritic change in the right temporomandibular joint. 4. The remainder of MRI of the brain is normal. Specifically, no acute infarct is seen. The etiology of patient's acute onset visual disturbance is not established on this exam.  This report was finalized on 4/27/2023 6:13 PM by Dr. Breezy Cummings M.D.      CT Angiogram Head    Result Date: 4/26/2023  Electronically signed by Stephanie Guzman MD on 04-26-23 at 2255    I have personally reviewed all medications:  Scheduled Medications  [MAR Hold] aspirin, 325 mg, Oral, Daily   Or  [MAR Hold] aspirin, 300 mg, Rectal, Daily  [MAR Hold] atorvastatin, 80 mg, Oral, Nightly  ceFAZolin, 2 g, Intravenous, Once  gabapentin, 600 mg, Oral, Daily    Infusions   Diet  NPO Diet NPO Type: Sips with Meds    I have personally reviewed:  [x]  Laboratory   [x]  Microbiology   [x]  Radiology   [x]   EKG/Telemetry  [x]  Cardiology/Vascular   [x]  Pathology    [x]  Records       Assessment/Plan     Active Hospital Problems    Diagnosis  POA   • **Visual disturbance [H53.9]  Yes   • Tobacco abuse [Z72.0]  Yes   • Chronic pain [G89.29]  Yes   • Bilateral carotid artery stenosis [I65.23]  Yes   • COPD (chronic obstructive pulmonary disease) [J44.9]  Yes   • Changes in vision [H53.9]  Yes      Resolved Hospital Problems   No resolved problems to display.       62 y.o. male admitted with Visual disturbance.    Assessment and plan  1.  Left ICA stenosis, central retinal artery occlusion, vascular surgery consulted, plan for CEA today.  Continue aspirin, Lipitor.  Neurology and vascular surgery on board.    2.  Bilateral carotid artery stenosis, follow management recommendations by neurology and vascular surgery.    3.  Hypertension, keep good control.  Titrate antihypertensives further based on BP control.     4.  Tobacco cessation counseling done, for tobacco seizure    5.  CODE STATUS is full code.  Further plans based on hospital course.          Latrell Moncada MD  [facility]  04/28/23  13:29 EDT

## 2023-04-28 NOTE — ANESTHESIA PREPROCEDURE EVALUATION
Anesthesia Evaluation                  Airway   Mallampati: II  TM distance: >3 FB  Neck ROM: full  No difficulty expected  Dental      Comment: Lower front bridge    Pulmonary - normal exam   (+) a smoker Current Smoked day of surgery, COPD,   Cardiovascular - normal exam    ECG reviewed    (+)  carotid artery disease carotid bilateral      Neuro/Psych    ROS Comment: Visual disturbance  GI/Hepatic/Renal/Endo      Musculoskeletal     Abdominal    Substance History      OB/GYN          Other                      Anesthesia Plan    ASA 3     general and [place]     intravenous induction     Anesthetic plan, risks, benefits, and alternatives have been provided, discussed and informed consent has been obtained with: patient.        CODE STATUS:    Code Status (Patient has no pulse and is not breathing): CPR (Attempt to Resuscitate)  Medical Interventions (Patient has pulse or is breathing): Full Support  Release to patient: Routine Release

## 2023-04-28 NOTE — ANESTHESIA POSTPROCEDURE EVALUATION
Patient: Rito Arnold    Procedure Summary     Date: 04/28/23 Room / Location: [facility] OR [address] [place] [address] MAIN OR    Anesthesia Start: 1523 Anesthesia Stop: 1752    Procedure: Left carotid endarterectomy (Left: Neck) Diagnosis:       Bilateral carotid artery stenosis      (Bilateral carotid artery stenosis [I65.23])    Surgeons: Ariel Quiros MD Provider: Ema, Jez Barrera MD    Anesthesia Type: general, Samantha ASA Status: 3          Anesthesia Type: general, Samantha    Vitals  Vitals Value Taken Time   /65 04/28/23 1831   Temp 36.6 °C (97.9 °F) 04/28/23 1746   Pulse 69 04/28/23 1836   Resp 18 04/28/23 1815   SpO2 99 % 04/28/23 1836   Vitals shown include unvalidated device data.        Post Anesthesia Care and Evaluation    Patient location during evaluation: bedside  Patient participation: complete - patient participated  Level of consciousness: awake  Pain management: adequate    Airway patency: patent  Anesthetic complications: No anesthetic complications  PONV Status: controlled  Cardiovascular status: acceptable  Respiratory status: acceptable  Hydration status: acceptable    Comments: --------------------            04/28/23               1815     --------------------   BP:       105/70     Pulse:      71       Resp:       18       Temp:                SpO2:      96%      --------------------

## 2023-04-28 NOTE — OP NOTE
Operative Note  Location: [facility]  Date of Admission:  4/26/2023  OR Date: 4/28/2023    Pre-op Diagnosis: left internal carotid artery stenosis, 50-79%     Post-op Diagnosis: Same    Procedure(s):  Left carotid endarterectomy    Surgeon(s):  Ariel Quiros MD    Assistant: Deepa Crawford    Anesthesia: General    Estimated Blood Loss: 100ml      Staff: Circulator: Bharati Heaton RN  Scrub Person: Tiffany Severino  Assistant: Deepa [facility]    Complications: None    Specimen: None    Implants:   Implant Name Type Inv. Item Serial No.  Lot No. LRB No. Used Action   PTCH VASC VASCUGUARD TPR/END 0.8X8CM STRL - [id number] Implant PTCH VASC VASCUGUARD TPR/END 0.8X8CM STRL  [facility] [id number] Left 1 Implanted   KT SEAL HEMOS ABS FLOSEAL MATRX FAST/PREP 10ML - [id number] Implant KT SEAL HEMOS ABS FLOSEAL MATRX FAST/PREP 10ML  [facility] [id number] Left 1 Implanted       Findings: Soft plaque throughout the carotid bulb with large ulceration posteriorly in the bulb    Indications:  Patient with a duplex examination identified internal carotid artery stenoses of 70-80% with amaurosis. The patient was seen in the Holding Room. The risks, benefits, complications, treatment options, and expected outcomes were reviewed with the patient. The risks and potential complications of their problem and purposed treatment include but are not limited to infection, bleeding, stroke, pain, nerve and vessel injury and complication secondary to the anesthetic. The patient concurred with the proposed plan, giving informed consent. The site of surgery properly noted/marked.       Procedure:  The patient was taken to Operating Room and identified as Rito Arnold and the procedure verified as left carotid endarterectomy. A Time Out was held and the above information confirmed.    The procedure was done under general endotracheal anesthesia. The neck was prepped with Chloroprep and draped in the usual  manner. Incision made through skin, subcutaneous tissue, and platysma. Bleeding was controlled with electrocautery. Sternocleidomastoid mobilized laterally. Crossing vessels controlled with hemoclips and electrocautery. Internal jugular vein identified. The facial vein was divided and ligated with silk ligatures. The vagus nerve was identified and protected throughout the case. Hypoglossal nerve also identified and protected throughout the case. Common, internal, and external carotids were all circumferentially controlled. The patient was heparinized to get ACT twice normal. Blood pressure kept over 140 systolic during clamping. Internal, common, and external carotid arteries were sequentially clamped. An incision made in the common carotid artery and extended into the internal carotid above the level of plaquing. Backbleeding was checked. It was poor . An external Sundt shunt was placed.. Endarterectomy was performed of the common and internal carotid artery under direct vision. Eversion endarterectomy was performed of the external carotid artery. Loose plaque was removed. Adequate endpoints achieved throughout. A bovine pericardial patch had been previously opened and soaked in antibiotic solution. A patch angioplasty closure of the endarterectomy site was then performed with running 6-0 Prolene suture without problems. All air was flushed prior to completion. Flow was restored to the external and then the internal carotid artery. Intraoperative duplex scan was then performed, which showed normal common, internal, and external carotid arteries with endarterectomy and patch angioplasty closure. Heparin was then reversed with protamine. Adequate hemostasis was achieved throughout. The wound was irrigated with antibiotic solution. Counts were correct. Sternocleidomastoid reapproximated with 3-0 Vicryl. The platysma closed with 4-0 Vicryl. Skin was closed with subcuticular Monocryl stitch. Dermabond applied. The  patient awoke neurologically intact. The patient tolerated the procedure without problems and was taken to recovery in stable condition.      Active Hospital Problems    Diagnosis  POA   • **Visual disturbance [H53.9]  Yes   • Tobacco abuse [Z72.0]  Yes   • Chronic pain [G89.29]  Yes   • Bilateral carotid artery stenosis [I65.23]  Yes   • COPD (chronic obstructive pulmonary disease) [J44.9]  Yes   • Changes in vision [H53.9]  Yes      Resolved Hospital Problems   No resolved problems to display.      Ariel Quiros MD     Date: 4/28/2023  Time: 17:54 EDT

## 2023-04-28 NOTE — PLAN OF CARE
Goal Outcome Evaluation:  VSS. Pain controlled with Lortab. NIHSS 0. Plan for CEA today. Consent on chart. Will continue to monitor.

## 2023-04-28 NOTE — PROGRESS NOTES
"Name: Rito Arnold ADMIT: 2023   : 1960  PCP: Provider, No Known    MRN: [id number] LOS: 1 days   AGE/SEX: 62 y.o. male  ROOM: [address]    62 y.o. male with left eye visual disturbance with retinal artery branch occlusion and high-grade ipsilateral left ICA stenosis.  Stable over the day yesterday and overnight.  No new neurologic symptoms.  Feels well and no complaints.    Scheduled Medications:   aspirin, 325 mg, Oral, Daily   Or  aspirin, 300 mg, Rectal, Daily  atorvastatin, 80 mg, Oral, Nightly  gabapentin, 600 mg, Oral, Daily    Vital Signs  Vital Signs Patient Vitals for the past 24 hrs:   BP Temp Temp src Pulse Resp SpO2 Height Weight   23 0700 153/91 98 °F (36.7 °C) Oral 75 16 96 % -- --   23 2345 150/84 -- -- 78 -- 92 % -- --   23 2300 150/84 97.7 °F (36.5 °C) Oral 83 16 96 % -- --   23 2050 172/98 98.4 °F (36.9 °C) Oral 80 16 94 % -- --   23 1722 163/98 -- -- 86 16 96 % -- --   23 1711 -- -- -- -- -- -- 177.8 cm (70\") 86.6 kg (191 lb)   23 1403 142/78 -- -- 86 20 96 % -- --   23 0923 144/82 97.5 °F (36.4 °C) Oral 80 18 95 % -- --     Physical Exam: Awake, alert, oriented and appropriate.  Speech fluent, cognition intact.  Sensorimotor exam normal.     Echo performed yesterday, interpretation pending, but by my interpretation has good EF with grade 1 diastolic dysfunction.  Valves okay.  No mention of filling defect.    Assessment & Plan   Assessment & Plan    Visual disturbance    Tobacco abuse    Chronic pain    Bilateral carotid artery stenosis    COPD (chronic obstructive pulmonary disease)    Changes in vision    62 y.o. male with symptomatic left ICA stenosis resulting in retinal artery branch occlusion and associated visual field defect, stable.  For left CEA.  Questions answered, informed consent.  Consent on chart.  Dr. Quiros to perform.    Seun Herrera MD  23  07:36 EDT    Please call my office with any question: " [phone]    Active Hospital Problems    Diagnosis  POA   • **Visual disturbance [H53.9]  Yes   • Tobacco abuse [Z72.0]  Yes   • Chronic pain [G89.29]  Yes   • Bilateral carotid artery stenosis [I65.23]  Yes   • COPD (chronic obstructive pulmonary disease) [J44.9]  Yes   • Changes in vision [H53.9]  Yes      Resolved Hospital Problems   No resolved problems to display.

## 2023-04-28 NOTE — BRIEF OP NOTE
CAROTID ENDARTERECTOMY  Progress Note    Rito Arnold  4/28/2023    Pre-op Diagnosis:   Bilateral carotid artery stenosis [I65.23]       Post-Op Diagnosis Codes:     * Bilateral carotid artery stenosis [I65.23]    Procedure/CPT® Codes:        Procedure(s):  Left carotid endarterectomy        Surgeon(s):  Ariel Quiros MD    Anesthesia: General    Staff:   Circulator: Bharati Heaton RN  Scrub Person: Tiffany Severino  Assistant: Deepa Crawford CST  Assistant: Deepa Crawford CST      Estimated Blood Loss: 100ml    Urine Voided: * No values recorded between 4/28/2023  3:23 PM and 4/28/2023  5:43 PM *    Specimens:                A: plaque          Drains: * No LDAs found *    Findings: severely ulcerated and soft plaque        Complications: none    Assistant: Deepa Crawford CST  was responsible for performing the following activities: Retraction, Suction, Irrigation, Suturing, Closing and Placing Dressing and their skilled assistance was necessary for the success of this case.    Ariel Quiros MD     Date: 4/28/2023  Time: 17:46 EDT

## 2023-04-29 ENCOUNTER — READMISSION MANAGEMENT (OUTPATIENT)
Dept: CALL CENTER | Facility: HOSPITAL | Age: 63
End: 2023-04-29
Payer: COMMERCIAL

## 2023-04-29 VITALS
BODY MASS INDEX: 27.35 KG/M2 | HEART RATE: 55 BPM | WEIGHT: 191 LBS | TEMPERATURE: 97.9 F | HEIGHT: 70 IN | OXYGEN SATURATION: 96 % | DIASTOLIC BLOOD PRESSURE: 65 MMHG | RESPIRATION RATE: 18 BRPM | SYSTOLIC BLOOD PRESSURE: 118 MMHG

## 2023-04-29 PROBLEM — H53.9 CHANGES IN VISION: Status: RESOLVED | Noted: 2023-04-27 | Resolved: 2023-04-29

## 2023-04-29 PROBLEM — H34.9 RETINAL ARTERY OCCLUSION: Status: ACTIVE | Noted: 2023-04-29

## 2023-04-29 PROBLEM — H53.9 VISUAL DISTURBANCE: Status: RESOLVED | Noted: 2023-04-26 | Resolved: 2023-04-29

## 2023-04-29 PROBLEM — H34.9 RETINAL ARTERY OCCLUSION: Status: RESOLVED | Noted: 2023-04-29 | Resolved: 2023-04-29

## 2023-04-29 LAB
ACT BLD: 137 SECONDS (ref 82–152)
ACT BLD: 143 SECONDS (ref 82–152)
ACT BLD: 251 SECONDS (ref 82–152)
ACT BLD: <50 SECONDS (ref 82–152)
ALBUMIN SERPL-MCNC: 3.3 G/DL (ref 3.5–5.2)
ALBUMIN/GLOB SERPL: 1.3 G/DL
ALP SERPL-CCNC: 55 U/L (ref 39–117)
ALT SERPL W P-5'-P-CCNC: 11 U/L (ref 1–41)
ANION GAP SERPL CALCULATED.3IONS-SCNC: 5.7 MMOL/L (ref 5–15)
AST SERPL-CCNC: 11 U/L (ref 1–40)
BASOPHILS # BLD AUTO: 0.02 10*3/MM3 (ref 0–0.2)
BASOPHILS NFR BLD AUTO: 0.1 % (ref 0–1.5)
BILIRUB SERPL-MCNC: 0.3 MG/DL (ref 0–1.2)
BUN SERPL-MCNC: 13 MG/DL (ref 8–23)
BUN/CREAT SERPL: 19.1 (ref 7–25)
CALCIUM SPEC-SCNC: 8.8 MG/DL (ref 8.6–10.5)
CHLORIDE SERPL-SCNC: 108 MMOL/L (ref 98–107)
CO2 SERPL-SCNC: 23.3 MMOL/L (ref 22–29)
CREAT SERPL-MCNC: 0.68 MG/DL (ref 0.76–1.27)
DEPRECATED RDW RBC AUTO: 46.9 FL (ref 37–54)
EGFRCR SERPLBLD CKD-EPI 2021: 103.8 ML/MIN/1.73
EOSINOPHIL # BLD AUTO: 0 10*3/MM3 (ref 0–0.4)
EOSINOPHIL NFR BLD AUTO: 0 % (ref 0.3–6.2)
ERYTHROCYTE [DISTWIDTH] IN BLOOD BY AUTOMATED COUNT: 14.7 % (ref 12.3–15.4)
GLOBULIN UR ELPH-MCNC: 2.5 GM/DL
GLUCOSE SERPL-MCNC: 103 MG/DL (ref 65–99)
HCT VFR BLD AUTO: 32.1 % (ref 37.5–51)
HGB BLD-MCNC: 10.4 G/DL (ref 13–17.7)
IMM GRANULOCYTES # BLD AUTO: 0.06 10*3/MM3 (ref 0–0.05)
IMM GRANULOCYTES NFR BLD AUTO: 0.4 % (ref 0–0.5)
LYMPHOCYTES # BLD AUTO: 1.45 10*3/MM3 (ref 0.7–3.1)
LYMPHOCYTES NFR BLD AUTO: 10.8 % (ref 19.6–45.3)
MCH RBC QN AUTO: 28.2 PG (ref 26.6–33)
MCHC RBC AUTO-ENTMCNC: 32.4 G/DL (ref 31.5–35.7)
MCV RBC AUTO: 87 FL (ref 79–97)
MONOCYTES # BLD AUTO: 0.82 10*3/MM3 (ref 0.1–0.9)
MONOCYTES NFR BLD AUTO: 6.1 % (ref 5–12)
NEUTROPHILS NFR BLD AUTO: 11.08 10*3/MM3 (ref 1.7–7)
NEUTROPHILS NFR BLD AUTO: 82.6 % (ref 42.7–76)
NRBC BLD AUTO-RTO: 0 /100 WBC (ref 0–0.2)
PLATELET # BLD AUTO: 225 10*3/MM3 (ref 140–450)
PMV BLD AUTO: 8.4 FL (ref 6–12)
POTASSIUM SERPL-SCNC: 4.2 MMOL/L (ref 3.5–5.2)
PROT SERPL-MCNC: 5.8 G/DL (ref 6–8.5)
RBC # BLD AUTO: 3.69 10*6/MM3 (ref 4.14–5.8)
SODIUM SERPL-SCNC: 137 MMOL/L (ref 136–145)
WBC NRBC COR # BLD: 13.43 10*3/MM3 (ref 3.4–10.8)

## 2023-04-29 PROCEDURE — 99232 SBSQ HOSP IP/OBS MODERATE 35: CPT | Performed by: NURSE PRACTITIONER

## 2023-04-29 PROCEDURE — 85025 COMPLETE CBC W/AUTO DIFF WBC: CPT | Performed by: SURGERY

## 2023-04-29 PROCEDURE — 80053 COMPREHEN METABOLIC PANEL: CPT | Performed by: SURGERY

## 2023-04-29 PROCEDURE — 25010000002 CEFAZOLIN IN DEXTROSE 2-4 GM/100ML-% SOLUTION: Performed by: SURGERY

## 2023-04-29 PROCEDURE — 94799 UNLISTED PULMONARY SVC/PX: CPT

## 2023-04-29 RX ORDER — NICOTINE 21 MG/24HR
1 PATCH, TRANSDERMAL 24 HOURS TRANSDERMAL EVERY 24 HOURS
Qty: 14 PATCH | Refills: 1 | Status: SHIPPED | OUTPATIENT
Start: 2023-04-29 | End: 2023-05-03

## 2023-04-29 RX ORDER — ASPIRIN 81 MG/1
81 TABLET, CHEWABLE ORAL DAILY
Qty: 90 TABLET | Refills: 0 | Status: SHIPPED | OUTPATIENT
Start: 2023-04-30

## 2023-04-29 RX ORDER — ATORVASTATIN CALCIUM 80 MG/1
80 TABLET, FILM COATED ORAL NIGHTLY
Qty: 90 TABLET | Refills: 0 | Status: SHIPPED | OUTPATIENT
Start: 2023-04-29

## 2023-04-29 RX ORDER — VARENICLINE TARTRATE 1 MG/1
1 TABLET, FILM COATED ORAL 2 TIMES DAILY
Qty: 56 TABLET | Refills: 4 | Status: CANCELLED | OUTPATIENT
Start: 2023-05-27 | End: 2023-10-14

## 2023-04-29 RX ORDER — HYDROCODONE BITARTRATE AND ACETAMINOPHEN 5; 325 MG/1; MG/1
1-2 TABLET ORAL EVERY 6 HOURS PRN
Qty: 30 TABLET | Refills: 0 | Status: SHIPPED | OUTPATIENT
Start: 2023-04-29 | End: 2023-05-03 | Stop reason: DRUGHIGH

## 2023-04-29 RX ADMIN — HYDROCODONE BITARTRATE AND ACETAMINOPHEN 1 TABLET: 10; 325 TABLET ORAL at 02:19

## 2023-04-29 RX ADMIN — ASPIRIN 81 MG: 81 TABLET, CHEWABLE ORAL at 08:06

## 2023-04-29 RX ADMIN — HYDROCODONE BITARTRATE AND ACETAMINOPHEN 1 TABLET: 10; 325 TABLET ORAL at 12:02

## 2023-04-29 RX ADMIN — IPRATROPIUM BROMIDE AND ALBUTEROL SULFATE 3 ML: .5; 3 SOLUTION RESPIRATORY (INHALATION) at 06:52

## 2023-04-29 RX ADMIN — GABAPENTIN 600 MG: 300 CAPSULE ORAL at 08:06

## 2023-04-29 RX ADMIN — SODIUM CHLORIDE 125 ML/HR: 9 INJECTION, SOLUTION INTRAVENOUS at 02:11

## 2023-04-29 RX ADMIN — CEFAZOLIN SODIUM 2 G: 2 INJECTION, SOLUTION INTRAVENOUS at 06:00

## 2023-04-29 RX ADMIN — HYDROCODONE BITARTRATE AND ACETAMINOPHEN 1 TABLET: 10; 325 TABLET ORAL at 08:06

## 2023-04-29 NOTE — PROGRESS NOTES
Name: Rito Arnold ADMIT: 2023   : 1960  PCP: Provider, No Known    MRN: [id number] LOS: 2 days   AGE/SEX: 62 y.o. male  ROOM: [address]    [place], Post Op Global    Chief Complaint   Patient presents with   • Eye Problem     CC: Postoperative day 1 status post left carotid endarterectomy  Subjective     62 y.o. male doing well status post left carotid endarterectomy.  Discharge on statin and baby aspirin planned.  Stable for discharge from vascular standpoint.  Would like cardiology to see prior to discharge due to reading of PFO on study and to establish him as a long-term patient.    Review of Systems moderate pain  Objective     Scheduled Medications:   aspirin, 81 mg, Oral, Daily  atorvastatin, 80 mg, Oral, Nightly  gabapentin, 600 mg, Oral, Daily  ipratropium-albuterol, 3 mL, Nebulization, Q8H - RT        Active Infusions:  lactated ringers, 9 mL/hr, Last Rate: 100 mL/hr (23 1523)  niCARdipine, 5-15 mg/hr  sodium chloride, 125 mL/hr, Last Rate: Stopped (23 0753)        As Needed Medications:  •  acetaminophen **OR** acetaminophen  •  bisacodyl  •  HYDROcodone-acetaminophen  •  HYDROcodone-acetaminophen  •  HYDROmorphone **AND** naloxone  •  nitroglycerin  •  ondansetron  •  ondansetron **OR** ondansetron  •  [COMPLETED] Insert Peripheral IV **AND** sodium chloride    Vital Signs  Vital Signs Patient Vitals for the past 24 hrs:   BP Temp Temp src Pulse Resp SpO2   23 0700 131/80 97.9 °F (36.6 °C) Oral 60 18 90 %   23 0652 -- -- -- 57 18 96 %   23 0631 130/71 -- -- (!) 49 -- --   23 0601 119/59 -- -- 67 -- --   23 0501 127/74 -- -- 55 -- 94 %   23 0431 115/70 -- -- 64 -- 94 %   23 0401 118/70 -- -- 56 -- 98 %   23 0331 103/61 -- -- 53 -- 98 %   23 0301 109/58 -- -- 55 -- 98 %   23 0230 98/56 -- -- (!) 49 -- 97 %   23 0131 95/51 -- -- 52 -- 98 %   23 0105 100/62 -- -- 55 -- 99 %   23 0000  99/70 -- -- 74 -- 90 %   04/28/23 2309 102/63 97.7 °F (36.5 °C) Oral 78 20 --   04/28/23 2300 105/57 -- -- 58 -- 94 %   04/28/23 2250 -- -- -- 81 18 94 %   04/28/23 2200 100/62 -- -- 59 -- 94 %   04/28/23 2100 97/73 -- -- 70 -- 94 %   04/28/23 2000 103/64 -- -- 64 -- 94 %   04/28/23 1931 94/58 98.9 °F (37.2 °C) Oral 57 20 --   04/28/23 1853 99/67 -- -- -- -- 94 %   04/28/23 1845 107/65 97.3 °F (36.3 °C) Oral 68 18 96 %   04/28/23 1830 102/65 -- -- 73 18 98 %   04/28/23 1815 105/70 -- -- 71 18 96 %   04/28/23 1800 98/70 -- -- 97 18 96 %   04/28/23 1755 99/71 -- -- 83 18 95 %   04/28/23 1750 104/64 -- -- 90 18 95 %   04/28/23 1746 99/70 97.9 °F (36.6 °C) Oral 84 18 95 %   04/28/23 1437 156/74 -- -- 75 16 99 %   04/28/23 1436 -- -- -- 74 -- 98 %   04/28/23 1433 -- -- -- 77 -- 98 %   04/28/23 1430 -- -- -- 73 -- 98 %   04/28/23 1427 -- -- -- 72 -- 98 %   04/28/23 1424 -- -- -- 73 -- 96 %   04/28/23 1421 -- -- -- 83 -- 97 %   04/28/23 1418 -- -- -- 89 -- 95 %   04/28/23 1321 155/86 -- -- -- -- 95 %   04/28/23 1100 135/76 97.8 °F (36.6 °C) Oral 79 16 94 %     Vital Signs (range)  Temp:  [97.3 °F (36.3 °C)-98.9 °F (37.2 °C)] 97.9 °F (36.6 °C)  Heart Rate:  [49-97] 60  Resp:  [16-20] 18  BP: ()/(51-86) 131/80  I/O:  I/O last 3 completed shifts:  In: 1920 [P.O.:720; I.V.:1200]  Out: 2325 [Urine:2325]  I/O:   Intake/Output Summary (Last 24 hours) at 4/29/2023 0926  Last data filed at 4/29/2023 0753  Gross per 24 hour   Intake 2829.17 ml   Output 1375 ml   Net 1454.17 ml     BMI:  Body mass index is 27.41 kg/m².    Physical Exam:  Physical Exam   Lungs clear  Heart regular rate and rhythm  Extremities viable  Left neck wound clear  Neurologically intact    Results Review:     CBC    Results from last 7 days   Lab Units 04/29/23  0600 04/28/23  0709 04/27/23  0921 04/26/23 2023   WBC 10*3/mm3 13.43* 10.71 8.17 9.16   HEMOGLOBIN g/dL 10.4* 12.8* 11.9* 11.6*   PLATELETS 10*3/mm3 225 311 273 260     BMP   Results from last  7 days   Lab Units 04/29/23  0600 04/28/23  0709 04/27/23  0921 04/26/23 2023   SODIUM mmol/L 137 137 139 143   POTASSIUM mmol/L 4.2 4.5 4.0 4.0   CHLORIDE mmol/L 108* 102 105 106   CO2 mmol/L 23.3 24.6 26.5 25.6   BUN mg/dL 13 13 10 14   CREATININE mg/dL 0.68* 0.91 0.93 0.93   GLUCOSE mg/dL 103* 96 128* 122*     Cr Clearance Estimated Creatinine Clearance: 134.4 mL/min (A) (by C-G formula based on SCr of 0.68 mg/dL (L)).  Coag   Results from last 7 days   Lab Units 04/26/23 2023   INR  1.04   APTT seconds 26.8     HbA1C   Lab Results   Component Value Date    HGBA1C 5.50 04/26/2023     Radiology(recent) No radiology results for the last day    Assessment & Plan     Assessment & Plan      Visual disturbance    Tobacco abuse    Chronic pain    Bilateral carotid artery stenosis    COPD (chronic obstructive pulmonary disease)    Changes in vision      62 y.o. male doing well status post endarterectomy.  Cleared for discharge from our standpoint.  We will follow him up in 2 weeks.  I will write pain medications for discharge.  He will need to go home on statin and aspirin      Ariel Quiros MD  04/29/23  09:25 EDT    Please call my office with any question: [phone]    Active Hospital Problems    Diagnosis  POA   • **Visual disturbance [H53.9]  Yes   • Tobacco abuse [Z72.0]  Yes   • Chronic pain [G89.29]  Yes   • Bilateral carotid artery stenosis [I65.23]  Yes   • COPD (chronic obstructive pulmonary disease) [J44.9]  Yes   • Changes in vision [H53.9]  Yes      Resolved Hospital Problems   No resolved problems to display.

## 2023-04-29 NOTE — PLAN OF CARE
Goal Outcome Evaluation:  Plan of Care Reviewed With: patient        Progress: improving  Outcome Evaluation: Vitals stable, pt on room air. Alert and orient. Up ad martine. PRN pain meds given for c/o pain. Pt to d/c home today with spouse to transport.

## 2023-04-29 NOTE — DISCHARGE SUMMARY
Patient Name: Rito Arnold  : 1960  MRN: [id number]    Date of Admission: 2023  Date of Discharge:  2023  Primary Care Physician: Provider, No Known      Chief Complaint:   Eye Problem      Discharge Diagnoses     Active Hospital Problems    Diagnosis  POA   • Tobacco abuse [Z72.0]  Yes   • Chronic pain [G89.29]  Yes   • Bilateral carotid artery stenosis [I65.23]  Yes   • COPD (chronic obstructive pulmonary disease) [J44.9]  Yes      Resolved Hospital Problems    Diagnosis Date Resolved POA   • **Retinal artery occlusion [H34.9] 2023 Yes   • Changes in vision [H53.9] 2023 Yes   • Visual disturbance [H53.9] 2023 Yes        Hospital Course     Mr. Arnold is a 62 y.o. male with a history of tobacco dependence, chronic pain who presented to [facility] initially complaining of cloudy vision in his left eye.  He saw his ophthalmologist who diagnosed a retinal artery occlusion of the left eye, and then he was referred to the emergency department for stroke work-up.  Please see the admitting history and physical for further details.      He was seen in consultation by neurology and vascular surgery.  Brain MRI was negative for an acute stroke.  A CTA of the head and neck showed 70% stenosis of the proximal right and left ICA.  Vascular surgery took him for left carotid endarterectomy on 2023.  He tolerated the procedure well.  He has been started on a baby aspirin and high intensity statin.  An echocardiogram did show a small PFO, and a referral to cardiology will be placed at discharge.  He will need to follow-up with neurology in 3 months, and vascular surgery in 2 weeks.  I have also ordered him nicotine patches and gum to help him quit smoking.    Day of Discharge     Subjective:  No events overnight.  He tolerated his left carotid endarterectomy well yesterday.    Physical Exam:  Temp:  [97.3 °F (36.3 °C)-98.9 °F (37.2 °C)] 97.9 °F (36.6 °C)  Heart Rate:   [49-97] 55  Resp:  [16-20] 18  BP: ()/(51-80) 118/65  Body mass index is 27.41 kg/m².  Physical Exam  Constitutional:       General: He is not in acute distress.     Appearance: He is not toxic-appearing.   Cardiovascular:      Rate and Rhythm: Normal rate and regular rhythm.      Heart sounds: Normal heart sounds.   Pulmonary:      Effort: Pulmonary effort is normal.      Breath sounds: Normal breath sounds.   Abdominal:      General: Bowel sounds are normal.      Palpations: Abdomen is soft.   Musculoskeletal:         General: No tenderness.      Right lower leg: No edema.      Left lower leg: No edema.   Skin:     Comments: Left carotid surgical site CDI   Neurological:      Mental Status: He is alert.   Psychiatric:         Mood and Affect: Mood normal.         Behavior: Behavior normal.         Consultants     Consult Orders (all) (From admission, onward)     Start     Ordered    04/28/23 1752  Inpatient Cardiology Consult  Once        Specialty:  Cardiology  Provider:  (Not yet assigned)    04/28/23 1753    04/27/23 0344  Inpatient Vascular Surgery Consult  Once        Specialty:  Vascular Surgery  Provider:  Jason Sauceda II, MD    04/27/23 0344 04/27/23 0343  Notify Stroke Coordinator  Once        Provider:  (Not yet assigned)    04/27/23 0344 04/27/23 0343  Inpatient Rehab Admission Consult  Once        Provider:  (Not yet assigned)    04/27/23 0344 04/27/23 0343  Consult to Case Management   Once        Provider:  (Not yet assigned)    04/27/23 0344 04/27/23 0343  Consult to Diabetes Educator  Once,   Status:  Canceled        Provider:  (Not yet assigned)    04/27/23 0344    04/27/23 0343  Inpatient Neurology Consult Stroke  Once        Specialty:  Neurology  Provider:  Corbin Rodriguez MD    04/27/23 0344 04/26/23 2325  [facility] (on-call MD unless specified) Details  Once        Specialty:  Hospitalist  Provider:  (Not yet assigned)    04/26/23 6480               Procedures     Imaging Results (All)     Procedure Component Value Units Date/Time    MRI Brain Without Contrast [[id number]] Collected: 04/27/23 0932     Updated: 04/27/23 1816    Narrative:      MRI OF THE BRAIN WITHOUT CONTRAST 04/27/2023     CLINICAL HISTORY: Acute onset visual disturbance bilaterally.     TECHNIQUE: Axial T1, FLAIR, fat-suppressed T2, axial diffusion and  gradient echo T2 and sagittal T1-weighted images were obtained of the  entire head.     There are no prior MRIs of the brain for comparison. Study is correlated  to a CT angiogram of the head and neck from [facility]  yesterday on 04/26/2023 at 9:20 PM.     FINDINGS: There are several tiny nodular foci of T2 and FLAIR  hyperintensity one measuring 4 mm in the anterior left subinsular white  matter. A tiny focus in the right frontal periventricular white matter  that are probably idiopathic white matter hyperintensities of no  significance. The remainder of the brain parenchyma is normal in signal  intensity. Specifically no diffusion weighted abnormality is identified  with no acute infarct identified. On the gradient echo T2 weighted  images I see no acute or old blood breakdown products intracranially.  Ventricles are normal in size. I see no focal mass effect and no midline  shift and no extra-axial fluid collections are identified. There is  minimal bilateral foraminal ethmoid sinus mucosal thickening and  inferior left maxillary sinus mucosal thickening. There is minimal right  sphenoid sinus mucosal thickening. The remainder of the paranasal  sinuses are clear. There is a tiny amount of fluid in the mastoid air  cells bilaterally. Good flow voids are demonstrated within the cerebral  vessels and in the dural venous sinuses. The calvarium and skull base  demonstrate normal marrow signal intensity. There is signal abnormality  in the right mandibular head with T1 low signal, some T2 high signal  felt to be degenerative  marrow edema from arthritic changes in the right  temporomandibular joint.        Impression:      1. No acute intracranial abnormality is identified to account for the  patient's acute onset visual disturbance.  2. There are several tiny nodular foci T2 high signal in the frontal  white matter that is felt to likely be idiopathic and of no  significance.  3. Mild bilateral medial frontal sinus and ethmoid sinus inferior left  maxillary sinus and right sphenoid sinus mucosal thickening. There is a  small amount of fluid in the mastoid air cells bilaterally.  4. There is marrow edema in the right mandibular head and neck that is  felt to be degenerative marrow edema from arthritic change in the right  temporomandibular joint.  4. The remainder of MRI of the brain is normal. Specifically, no acute  infarct is seen. The etiology of patient's acute onset visual  disturbance is not established on this exam.     This report was finalized on 4/27/2023 6:13 PM by Dr. Breezy Cummings M.D.       CT Angiogram Neck [[id number]] Collected: 04/26/23 2303     Updated: 04/26/23 2303    Narrative:        Patient: YANELY TOBAR  Time Out: 23:03  Exam(s): CTA NECK     EXAM:    CT Neck With Intravenous Contrast    CLINICAL HISTORY:     Reason for exam: Left eye vision changes.    TECHNIQUE:    Routine carotid CT protocol was performed with intravenous contrast.    NASCET criteria using the distal ICAs for comparison were used for   evaluation of stenoses.  CTDI is 115 mGy and DLP is 2725 mGy-cm.  This CT   exam was performed according to the principle of ALARA (As Low As   Reasonably Achievable) by using one or more of the following dose   reduction techniques: automated exposure control, adjustment of the mA   and or kV according to patient size, and or use of iterative   reconstruction technique.    COMPARISON:    None.    FINDINGS:     VASCULATURE:    Right common carotid artery:  Unremarkable.  No occlusion or   significant stenosis.   No dissection.    Right internal carotid artery: There is a 70% stenosis of the proximal   right ICA.  No dissection.    Right external carotid artery:  Unremarkable.  No occlusion.    Right vertebral artery:  Unremarkable.  No occlusion or significant   stenosis.  No dissection.      Left common carotid artery: There is a 70% stenosis of the proximal   left ICA.  No dissection.    Left external carotid artery:  Unremarkable.  No occlusion.    Left vertebral artery:  Unremarkable.  No occlusion or significant   stenosis.  No dissection.     NECK:    Bones joints: Moderate to advanced disc degeneration at C3-4, C4-5, C5-  6 and C6-7 with a mild spinal canal stenosis at C6-7.  Remote fracture   deformities of T4 and T5.    Soft tissues: Prominent mediastinal and hilar lymph nodes.  Prominent   cervical lymph nodes.    Lung apices: Findings concerning for bronchitis with pneumonitis and   chronic interstitial scarring with moderate centrilobular emphysematous   changes.     CAROTID STENOSIS REFERENCE USING NASCET CRITERIA:    % ICA stenosis = (1 - narrowest ICA diameter diameter of distal   cervical ICA) x 100.    Mild - <50% stenosis.    Moderate - 50-69% stenosis.    Severe - 70-94% stenosis.    Near occlusion - 95-99% stenosis.    Occluded - 100% stenosis.    IMPRESSION:       There is a 70% stenosis of the proximal right and left ICA.      Impression:          Electronically signed by Stephanie Guzman MD on 04-26-23 at 2303    CT Angiogram Head [[id number]] Collected: 04/26/23 2256     Updated: 04/26/23 2256    Narrative:        Patient: YANELY TOBAR  Time Out: 22:55  Exam(s): CTA HEAD     EXAM:    CT Head With Intravenous Contrast    CLINICAL HISTORY:     Reason for exam: Left eye vision changes.    TECHNIQUE:  AI analysis of LVO was utilized    Axial computed tomographic images of the head with intravenous contrast.    CTDI is 115 mGy and DLP is 2725 mGy-cm.  This CT exam was performed   according to the principle  of ALARA (As Low As Reasonably Achievable) by   using one or more of the following dose reduction techniques: automated   exposure control, adjustment of the mA and or kV according to patient   size, and or use of iterative reconstruction technique.    COMPARISON:    No relevant prior studies available.    FINDINGS:    Right internal carotid artery:  No acute findings.  Intracranial   segment is patent with no significant stenosis.  No aneurysm.    Right anterior cerebral artery:  Unremarkable.  No occlusion or   significant stenosis.  No aneurysm.    Right middle cerebral artery:  Unremarkable.  No occlusion or   significant stenosis.  No aneurysm.    Right posterior cerebral artery:  Unremarkable.  No occlusion or   significant stenosis.  No aneurysm.    Right vertebral artery:  Unremarkable as visualized.      Left internal carotid artery:  No acute findings.  Intracranial segment   is patent with no significant stenosis.  No aneurysm.    Left anterior cerebral artery:  Unremarkable.  No occlusion or   significant stenosis.  No aneurysm.    Left middle cerebral artery:  Unremarkable.  No occlusion or   significant stenosis.  No aneurysm.    Left posterior cerebral artery:  Unremarkable.  No occlusion or   significant stenosis.  No aneurysm.    Left vertebral artery:  Unremarkable as visualized.      Basilar artery:  Unremarkable.  No occlusion or significant stenosis.    No aneurysm.    IMPRESSION:       Negative CT angiogram of the head.    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      Impression:          Electronically signed by Stephanie Guzman MD on 04-26-23 at 2255          Pertinent Labs     Results from last 7 days   Lab Units 04/29/23  0600 04/28/23  0709 04/27/23  0921 04/26/23  2023   WBC 10*3/mm3 13.43* 10.71 8.17 9.16   HEMOGLOBIN g/dL 10.4* 12.8* 11.9* 11.6*   PLATELETS 10*3/mm3 225 311 273 260     Results from last 7 days   Lab Units  04/29/23  0600 04/28/23  0709 04/27/23 0921 04/26/23 2023   SODIUM mmol/L 137 137 139 143   POTASSIUM mmol/L 4.2 4.5 4.0 4.0   CHLORIDE mmol/L 108* 102 105 106   CO2 mmol/L 23.3 24.6 26.5 25.6   BUN mg/dL 13 13 10 14   CREATININE mg/dL 0.68* 0.91 0.93 0.93   GLUCOSE mg/dL 103* 96 128* 122*   Estimated Creatinine Clearance: 134.4 mL/min (A) (by C-G formula based on SCr of 0.68 mg/dL (L)).  Results from last 7 days   Lab Units 04/29/23  0600 04/28/23  0709 04/27/23 0921 04/26/23 2023   ALBUMIN g/dL 3.3* 4.3 3.9 4.1   BILIRUBIN mg/dL 0.3 0.4 0.4 0.3   ALK PHOS U/L 55 73 70 67   AST (SGOT) U/L 11 15 13 12   ALT (SGPT) U/L 11 10 12 8     Results from last 7 days   Lab Units 04/29/23  0600 04/28/23  0709 04/27/23 0921 04/26/23 2023   CALCIUM mg/dL 8.8 9.7 9.5 9.4   ALBUMIN g/dL 3.3* 4.3 3.9 4.1           Results from last 7 days   Lab Units 04/27/23 0921   CHOLESTEROL mg/dL 199   TRIGLYCERIDES mg/dL 90   HDL CHOL mg/dL 50   LDL CHOL mg/dL 133*           Test Results Pending at Discharge       Discharge Details        Discharge Medications      New Medications      Instructions Start Date   aspirin 81 MG chewable tablet   81 mg, Oral, Daily   Start Date: April 30, 2023     atorvastatin 80 MG tablet  Commonly known as: LIPITOR   80 mg, Oral, Nightly      nicotine 21 MG/24HR patch  Commonly known as: Nicoderm CQ   1 patch, Transdermal, Every 24 Hours      nicotine 14 MG/24HR patch  Commonly known as: Nicoderm CQ   1 patch, Transdermal, Every 24 Hours      nicotine 7 MG/24HR patch  Commonly known as: Nicoderm CQ   1 patch, Transdermal, Every 24 Hours      nicotine polacrilex 4 MG gum  Commonly known as: Nicorette   4 mg, Mouth/Throat, As Needed         Changes to Medications      Instructions Start Date   HYDROcodone-acetaminophen  MG per tablet  Commonly known as: NORCO  What changed: Another medication with the same name was added. Make sure you understand how and when to take each.   hydrocodone 10  mg-acetaminophen 325 mg tablet      HYDROcodone-acetaminophen 5-325 MG per tablet  Commonly known as: Lexa  What changed: You were already taking a medication with the same name, and this prescription was added. Make sure you understand how and when to take each.   1-2 tablets, Oral, Every 6 Hours PRN         Continue These Medications      Instructions Start Date   gabapentin 600 MG tablet  Commonly known as: NEURONTIN   Every 24 Hours             No Known Allergies      Discharge Disposition:  Home or Self Care    Discharge Diet:  Diet Order   Procedures   • Diet: Cardiac Diets, Diabetic Diets; Healthy Heart (2-3 Na+); Consistent Carbohydrate; Texture: Regular Texture (IDDSI 7); Fluid Consistency: Thin (IDDSI 0)       Discharge Activity:   Activity Instructions     Activity as Tolerated            CODE STATUS:    Code Status and Medical Interventions:   Ordered at: 04/28/23 1659     Level Of Support Discussed With:    Patient     Code Status (Patient has no pulse and is not breathing):    CPR (Attempt to Resuscitate)     Medical Interventions (Patient has pulse or is breathing):    Full Support     Release to patient:    Routine Release       Future Appointments   Date Time Provider Department Center   5/3/2023  2:30 PM Geraldo Morris DO [facility]     Additional Instructions for the Follow-ups that You Need to Schedule     Call MD With Problems / Concerns   As directed      Instructions: return to the hospital if you experience chest pain, shortness of breath, abdominal pain, nausea, vomiting, fevers, sweats, chills, or worsening of your symptoms    Order Comments: Instructions: return to the hospital if you experience chest pain, shortness of breath, abdominal pain, nausea, vomiting, fevers, sweats, chills, or worsening of your symptoms          Discharge Follow-up with PCP   As directed       Currently Documented PCP:    Richard, No Known    PCP Phone Number:    [phone]     Follow Up Details: 2 weeks          Discharge Follow-up with Specialty: cardiology, as directed   As directed      Specialty: cardiology, as directed         Discharge Follow-up with Specified Provider: neurology 3 months   As directed      To: neurology 3 months         Discharge Follow-up with Specified Provider: vascular surgery, 2 weeks   As directed      To: vascular surgery, 2 weeks            Follow-up Information     Ariel Quiros MD Follow up in 1 week(s).    Specialty: Vascular Surgery  Why: For wound re-check  Contact information:  [address] [id number] [phone]             Provider, No Known .    Why: 2 weeks  Contact information:  [facility] [id number] [phone]                         Additional Instructions for the Follow-ups that You Need to Schedule     Call MD With Problems / Concerns   As directed      Instructions: return to the hospital if you experience chest pain, shortness of breath, abdominal pain, nausea, vomiting, fevers, sweats, chills, or worsening of your symptoms    Order Comments: Instructions: return to the hospital if you experience chest pain, shortness of breath, abdominal pain, nausea, vomiting, fevers, sweats, chills, or worsening of your symptoms          Discharge Follow-up with PCP   As directed       Currently Documented PCP:    Provider, No Known    PCP Phone Number:    [phone]     Follow Up Details: 2 weeks         Discharge Follow-up with Specialty: cardiology, as directed   As directed      Specialty: cardiology, as directed         Discharge Follow-up with Specified Provider: neurology 3 months   As directed      To: neurology 3 months         Discharge Follow-up with Specified Provider: vascular surgery, 2 weeks   As directed      To: vascular surgery, 2 weeks           Time Spent on Discharge:  Greater than 30 minutes      Gunnar Navarro MD  [facility]  04/29/23  13:52 EDT

## 2023-04-29 NOTE — PLAN OF CARE
Goal Outcome Evaluation:   VSS. A&OX4. Pt states vision is slightly improved. Pain managed with PRN pain medications. Blood pressure soft. A line dampened at times. NS @ 125 ml/hr. No neuro deficits noted. NIH 0. Call light in reach.

## 2023-04-29 NOTE — PROGRESS NOTES
DOS: 2023  NAME: Rito Arnold   : 1960  PCP: Provider, No Known    Chief Complaint   Patient presents with   • Eye Problem        Stroke    Subjective: No acute events overnight. Doing well post CEA. No new focal neurological deficits. States vision is stable. No family at bedside.     Objective:  Vital signs:      Vitals:    23 0631 23 0652 23 0700 23 0800   BP: 130/71  131/80 127/80   BP Location:   Left arm Left arm   Patient Position:   Lying Lying   Pulse: (!) 49 57 60 79   Resp:  18 18    Temp:   97.9 °F (36.6 °C)    TempSrc:   Oral    SpO2:  96% 90% 94%   Weight:       Height:           Current Facility-Administered Medications:   •  acetaminophen (TYLENOL) tablet 650 mg, 650 mg, Oral, Q4H PRN **OR** acetaminophen (TYLENOL) suppository 650 mg, 650 mg, Rectal, Q4H PRN, Ariel Quiros MD  •  aspirin chewable tablet 81 mg, 81 mg, Oral, Daily, Ariel Quiros MD, 81 mg at 23  •  atorvastatin (LIPITOR) tablet 80 mg, 80 mg, Oral, Nightly, Ariel Quiros MD, 80 mg at 23  •  bisacodyl (DULCOLAX) suppository 10 mg, 10 mg, Rectal, Daily PRN, Ariel Quiros MD  •  gabapentin (NEURONTIN) capsule 600 mg, 600 mg, Oral, Daily, Ariel Quiros MD, 600 mg at 23  •  HYDROcodone-acetaminophen (NORCO)  MG per tablet 1 tablet, 1 tablet, Oral, Q4H PRN, Ariel Quiros MD, 1 tablet at 23  •  HYDROcodone-acetaminophen (NORCO) 7.5-325 MG per tablet 1 tablet, 1 tablet, Oral, Q4H PRN, Ariel Quiros MD  •  HYDROmorphone (DILAUDID) injection 0.5 mg, 0.5 mg, Intravenous, Q2H PRN, 0.5 mg at 23 **AND** naloxone (NARCAN) injection 0.4 mg, 0.4 mg, Intravenous, Q5 Min PRN, Ariel Quiros MD  •  ipratropium-albuterol (DUO-NEB) nebulizer solution 3 mL, 3 mL, Nebulization, Q8H - RT, Ariel Quiros MD, 3 mL at 23 0652  •  lactated ringers infusion, 9 mL/hr, Intravenous, Continuous, Ariel Quiros MD, Last Rate: 100 mL/hr at  04/28/23 1523, New Bag at 04/28/23 1646  •  niCARdipine (CARDENE) 25 mg in 250 mL NS infusion kit, 5-15 mg/hr, Intravenous, Titrated, Ariel Quiros MD  •  nitroglycerin (NITROSTAT) SL tablet 0.4 mg, 0.4 mg, Sublingual, Q5 Min PRN, Ariel Quiros MD  •  ondansetron (ZOFRAN) injection 4 mg, 4 mg, Intravenous, Q6H PRN, Ariel Quiros MD  •  ondansetron (ZOFRAN) tablet 4 mg, 4 mg, Oral, Q6H PRN **OR** ondansetron (ZOFRAN) injection 4 mg, 4 mg, Intravenous, Q6H PRN, Ariel Quiros MD  •  [COMPLETED] Insert Peripheral IV, , , Once **AND** sodium chloride 0.9 % flush 10 mL, 10 mL, Intravenous, PRN, Ariel Quiros MD  •  sodium chloride 0.9 % infusion, 125 mL/hr, Intravenous, Continuous, Ariel Quiros MD, Stopped at 04/29/23 0753    PRN meds  •  acetaminophen **OR** acetaminophen  •  bisacodyl  •  HYDROcodone-acetaminophen  •  HYDROcodone-acetaminophen  •  HYDROmorphone **AND** naloxone  •  nitroglycerin  •  ondansetron  •  ondansetron **OR** ondansetron  •  [COMPLETED] Insert Peripheral IV **AND** sodium chloride    No current facility-administered medications on file prior to encounter.     Current Outpatient Medications on File Prior to Encounter   Medication Sig   • gabapentin (NEURONTIN) 600 MG tablet Daily.   • HYDROcodone-acetaminophen (NORCO)  MG per tablet hydrocodone 10 mg-acetaminophen 325 mg tablet       General appearance: Elderly male, NAD, alert and cooperative, well groomed  HEENT: Normocephalic, atraumatic  Resp: Even and unlabored  Skin: warm, dry     Neurological:   MS: oriented x3, normal attention/concentration, language intact, no neglect, normal fund of knowledge  CN: visual fields full on exam although pt subjectively reports partial vision loss of left eye, EOMI, facial sensation equal, no facial droop, tongue midline  Motor: 5/5 in all 4 ext., normal tone  Sensory: light touch and cold sensation intact in all 4 ext.  Coordination: Normal finger to nose test  Rapid alternating  movements: normal finger to thumb tap    Physical exam performed changes noted.    Laboratory results:  Lab Results   Component Value Date    TSH 1.460 04/27/2023     Lab Results   Component Value Date    HGBA1C 5.50 04/26/2023     No results found for: [id number]  Lab Results   Component Value Date    CHOL 199 04/27/2023     Lab Results   Component Value Date    TRIG 90 04/27/2023     Lab Results   Component Value Date    HDL 50 04/27/2023     Lab Results   Component Value Date     (H) 04/27/2023     Lab Results   Component Value Date    WBC 13.43 (H) 04/29/2023    HGB 10.4 (L) 04/29/2023    HCT 32.1 (L) 04/29/2023    MCV 87.0 04/29/2023     04/29/2023     Lab Results   Component Value Date    GLUCOSE 103 (H) 04/29/2023    BUN 13 04/29/2023    CREATININE 0.68 (L) 04/29/2023    BCR 19.1 04/29/2023    K 4.2 04/29/2023    CO2 23.3 04/29/2023    CALCIUM 8.8 04/29/2023    ALBUMIN 3.3 (L) 04/29/2023    AST 11 04/29/2023    ALT 11 04/29/2023     Lab Results   Component Value Date    PTT 26.8 04/26/2023     Lab Results   Component Value Date    INR 1.04 04/26/2023    PROTIME 13.7 04/26/2023     Brief Urine Lab Results     None          Review and interpretation of imaging:  CT Angiogram Neck    Result Date: 4/26/2023  Electronically signed by Stephanie Guzman MD on 04-26-23 at 2303    MRI Brain Without Contrast    Result Date: 4/27/2023  1. No acute intracranial abnormality is identified to account for the patient's acute onset visual disturbance. 2. There are several tiny nodular foci T2 high signal in the frontal white matter that is felt to likely be idiopathic and of no significance. 3. Mild bilateral medial frontal sinus and ethmoid sinus inferior left maxillary sinus and right sphenoid sinus mucosal thickening. There is a small amount of fluid in the mastoid air cells bilaterally. 4. There is marrow edema in the right mandibular head and neck that is felt to be degenerative marrow edema from arthritic  change in the right temporomandibular joint. 4. The remainder of MRI of the brain is normal. Specifically, no acute infarct is seen. The etiology of patient's acute onset visual disturbance is not established on this exam.  This report was finalized on 4/27/2023 6:13 PM by Dr. Breezy Cummings M.D.      CT Angiogram Head    Result Date: 4/26/2023  Electronically signed by Stephanie Guzman MD on 04-26-23 at 2255    Results for orders placed during the hospital encounter of 04/26/23    Adult transthoracic echo complete    Interpretation Summary  •  The agitated saline study is positive, consistent with a small PFO  •  The left ventricular cavity is mildly dilated.  •  Left ventricular systolic function is normal. Left ventricular ejection fraction appears to be 56 - 60%.  •  Left ventricular diastolic function is consistent with (grade I) impaired relaxation.  •  Normal right ventricular cavity size and systolic function noted.  •  The left atrial cavity is mildly dilated.  •  There is no evidence of pericardial effusion      Impression/Assessment:  This is a 62-year-old male with past medical history of tobacco abuse (1.5 packs/day) who presented to the hospital on 4/26/2023 with complaints of loss of vision in the left eye for a little over a week period he presented initially to his ophthalmologist and was told that he had plaque in his retinal artery and needed to go to the ER for stroke work-up.  He underwent a CTA head and neck that revealed approximately 70% stenosis of the proximal right and left ICA.  MRI brain without did not reveal any evidence of restricted diffusion. BP on arrival 156/93, .  EKG with predominantly NSR.  .  Temp 98.3.     Diagnosis:  Left central retinal artery occlusion, atheroembolic secondary to left ICA atherosclerosis s/p left CEA  Bilateral carotid stenosis  Tobacco abuse  Dyslipidemia  Small PFO     Additional work up to date:  Carotid duplex: Modest atherosclerosis in the  proximal R ICA with less than 50% ICA stenosis, large amount of homogeneous atherosclerotic plaque in the carotid bulb which is dilated but velocities indicate less than 50% ICA stenosis  2D Echo: LA cavity mildly dilated, agitated saline study positive consistent with a small PFO, EF 56 to 60%, no evidence of LV thrombus, all LV wall segments contract normally, no AV stenosis, trace to mild MVR.    Plan:  Doing well post surgery, no complaints. No new focal neurologic deficts. Vision is the same.  Continue ASA 81mg  Continue Lipitor 80mg  Encouraged to quit smoking completely  F/U with opthalmologist for driving clearance.   Neurochecks per stroke protocol  Normalize BP  Stroke Education  ELIZABETH/SCDs  PT/OT/ST  Sent message to office for outpatient stroke follow up in 3 months.  Therapies as written. CCP for discharge planning. Call RRT for any acute neurological changes. We will sign off, will see again per request.    Case discussed with patient, RN, and Dr. Rodriguez, and he agrees with plan above.    FRANCISCO JAVIER Paula

## 2023-04-30 NOTE — OUTREACH NOTE
Prep Survey    Flowsheet Row Responses   [facility] facility patient discharged from? [facility]   Is LACE score < 7 ? No   Eligibility [facility]   Date of Admission 04/26/23   Date of Discharge 04/29/23   Discharge Disposition Home or Self Care   Discharge diagnosis Retinal artery occlusion-Left carotid endarterectomy   Does the patient have one of the following disease processes/diagnoses(primary or secondary)? Other   Does the patient have Home health ordered? No   Is there a DME ordered? No   Comments regarding appointments New PCP appt   Prep survey completed? Yes          CARLIE CERDA - Registered Nurse

## 2023-05-01 ENCOUNTER — TRANSITIONAL CARE MANAGEMENT TELEPHONE ENCOUNTER (OUTPATIENT)
Dept: CALL CENTER | Facility: HOSPITAL | Age: 63
End: 2023-05-01
Payer: COMMERCIAL

## 2023-05-01 NOTE — CASE MANAGEMENT/SOCIAL WORK
Case Management Discharge Note      Final Note: Discharged home         Selected Continued Care - Discharged on 4/29/2023 Admission date: 4/26/2023 - Discharge disposition: Home or Self Care    Destination    No services have been selected for the patient.              Durable Medical Equipment    No services have been selected for the patient.              Dialysis/Infusion    No services have been selected for the patient.              Home Medical Care    No services have been selected for the patient.              Therapy    No services have been selected for the patient.              Community Resources    No services have been selected for the patient.              Community & DME    No services have been selected for the patient.                  Transportation Services  Private: Car    Final Discharge Disposition Code: 01 - home or self-care

## 2023-05-01 NOTE — OUTREACH NOTE
Call Center TCM Note    Flowsheet Row Responses   [facility] patient discharged from? [facility]   Does the patient have one of the following disease processes/diagnoses(primary or secondary)? Other   TCM attempt successful? Yes   Call start time 1041   Call end time 1042   Discharge diagnosis Retinal artery occlusion-Left carotid endarterectomy   Meds reviewed with patient/caregiver? Yes   Is the patient having any side effects they believe may be caused by any medication additions or changes? No   Does the patient have all medications ordered at discharge? Yes   Is the patient taking all medications as directed (includes completed medication regime)? Yes   Comments New pt/hosp dc fu apt on 5/3/23 with PCP    Does the patient have an appointment with their PCP within 7 days of discharge? Yes   Has home health visited the patient within 72 hours of discharge? N/A   Psychosocial issues? No   Did the patient receive a copy of their discharge instructions? Yes   Nursing interventions Reviewed instructions with patient   What is the patient's perception of their health status since discharge? Improving   Is the patient/caregiver able to teach back signs and symptoms related to disease process for when to call PCP? Yes   Is the patient/caregiver able to teach back signs and symptoms related to disease process for when to call 911? Yes   Is the patient/caregiver able to teach back the hierarchy of who to call/visit for symptoms/problems? PCP, Specialist, Home health nurse, Urgent Care, ED, 911 Yes   If the patient is a current smoker, are they able to teach back resources for cessation? Not a smoker  [recently quit cold turkey per pt ]   TCM call completed? Yes   Call end time 1042          Dona Coleman RN    5/1/2023, 10:43 EDT

## 2023-05-02 NOTE — PAYOR COMM NOTE
"Rito Arnold (62 y.o. Male)     PLEASE SEE ATTACHED FOR DC NOTICE    REF #   [id number]    THANK YOU  REINALDO BOLIVAR RN/ DEPT  [facility]   [phone]  [phone]    Date of Birth   1960    Social Security Number       [place]   [address] [place] [address]    Home Phone   [phone]    MRN   [id number]       Hindu   Sikhism    Marital Status                               Admission Date   23    Admission Type   Emergency    Admitting Provider   Latrell Moncada MD    Attending Provider       Department, Room/Bed   [facility], E552/1       Discharge Date   2023    Discharge Disposition   Home or Self Care    Discharge Destination                               Attending Provider: (none)   Allergies: No Known Allergies    Isolation: None   Infection: None   Code Status: Prior    Ht: 177.8 cm (70\")   Wt: 86.6 kg (191 lb)    Admission Cmt: None   Principal Problem: Retinal artery occlusion [H34.9]                 Active Insurance as of 2023     Primary Coverage     Payor Plan Insurance Group Employer/Plan Group    ANTHEM BLUE CROSS ANTHEM BLUE CROSS BLUE SHIELD PPO [id number]     Payor Plan Address Payor Plan Phone Number Payor Plan Fax Number Effective Dates    [address] [phone]  2022 - None Entered    [place] [address]       Subscriber Name Subscriber Birth Date Member ID       RITO ARNOLD 1960 [id number]                 Emergency Contacts      (Rel.) Home Phone Work Phone Mobile Phone    ONEYDA ARNOLD (Spouse) -- -- [phone]            [place]: [facility] [id number]  Tax ID [id number]     Discharge Summary      Gunnar Navarro MD at 23 1352              Patient Name: Rito Arnold  : 1960  MRN: [id number]    Date of Admission: 2023  Date of Discharge:  2023  Primary Care Physician: Provider, No Known      Chief Complaint:   Eye Problem      Discharge Diagnoses "     Active Hospital Problems    Diagnosis  POA   • Tobacco abuse [Z72.0]  Yes   • Chronic pain [G89.29]  Yes   • Bilateral carotid artery stenosis [I65.23]  Yes   • COPD (chronic obstructive pulmonary disease) [J44.9]  Yes      Resolved Hospital Problems    Diagnosis Date Resolved POA   • **Retinal artery occlusion [H34.9] 04/29/2023 Yes   • Changes in vision [H53.9] 04/29/2023 Yes   • Visual disturbance [H53.9] 04/29/2023 Yes        Hospital Course     Mr. Arnold is a 62 y.o. male with a history of tobacco dependence, chronic pain who [facility] complaining of cloudy vision in his left eye.  He saw his ophthalmologist who diagnosed a retinal artery occlusion of the left eye, and then he was referred to the emergency department for stroke work-up.  Please see the admitting history and physical for further details.      He was seen in consultation by neurology and vascular surgery.  Brain MRI was negative for an acute stroke.  A CTA of the head and neck showed 70% stenosis of the proximal right and left ICA.  Vascular surgery took him for left carotid endarterectomy on 4/28/2023.  He tolerated the procedure well.  He has been started on a baby aspirin and high intensity statin.  An echocardiogram did show a small PFO, and a referral to cardiology will be placed at discharge.  He will need to follow-up with neurology in 3 months, and vascular surgery in 2 weeks.  I have also ordered him nicotine patches and gum to help him quit smoking.    Day of Discharge     Subjective:  No events overnight.  He tolerated his left carotid endarterectomy well yesterday.    Physical Exam:  Temp:  [97.3 °F (36.3 °C)-98.9 °F (37.2 °C)] 97.9 °F (36.6 °C)  Heart Rate:  [49-97] 55  Resp:  [16-20] 18  BP: ()/(51-80) 118/65  Body mass index is 27.41 kg/m².  Physical Exam  Constitutional:       General: He is not in acute distress.     Appearance: He is not toxic-appearing.   Cardiovascular:       Rate and Rhythm: Normal rate and regular rhythm.      Heart sounds: Normal heart sounds.   Pulmonary:      Effort: Pulmonary effort is normal.      Breath sounds: Normal breath sounds.   Abdominal:      General: Bowel sounds are normal.      Palpations: Abdomen is soft.   Musculoskeletal:         General: No tenderness.      Right lower leg: No edema.      Left lower leg: No edema.   Skin:     Comments: Left carotid surgical site CDI   Neurological:      Mental Status: He is alert.   Psychiatric:         Mood and Affect: Mood normal.         Behavior: Behavior normal.         Consultants     Consult Orders (all) (From admission, onward)     Start     Ordered    04/28/23 1752  Inpatient Cardiology Consult  Once        Specialty:  Cardiology  Provider:  (Not yet assigned)    04/28/23 1753    04/27/23 0344  Inpatient Vascular Surgery Consult  Once        Specialty:  Vascular Surgery  Provider:  Jason Sauceda II, MD    04/27/23 0344 04/27/23 0343  Notify Stroke Coordinator  Once        Provider:  (Not yet assigned)    04/27/23 0344    04/27/23 0343  Inpatient Rehab Admission Consult  Once        Provider:  (Not yet assigned)    04/27/23 0344    04/27/23 0343  Consult to Case Management   Once        Provider:  (Not yet assigned)    04/27/23 0344    04/27/23 0343  Consult to Diabetes Educator  Once,   Status:  Canceled        Provider:  (Not yet assigned)    04/27/23 0344    04/27/23 0343  Inpatient Neurology Consult Stroke  Once        Specialty:  Neurology  Provider:  Corbin Rodriguez MD    04/27/23 0344    04/26/23 2325  [facility] (on-call MD unless specified) Details  Once        Specialty:  Hospitalist  Provider:  (Not yet assigned)    04/26/23 2324              Procedures     Imaging Results (All)     Procedure Component Value Units Date/Time    MRI Brain Without Contrast [[id number]] Collected: 04/27/23 0932     Updated: 04/27/23 1816    Narrative:      MRI OF THE BRAIN WITHOUT CONTRAST  04/27/2023     CLINICAL HISTORY: Acute onset visual disturbance bilaterally.     TECHNIQUE: Axial T1, FLAIR, fat-suppressed T2, axial diffusion and  gradient echo T2 and sagittal T1-weighted images were obtained of the  entire head.     There are no prior MRIs of the brain for comparison. Study is correlated  to a CT angiogram of the head and neck from [facility]  yesterday on 04/26/2023 at 9:20 PM.     FINDINGS: There are several tiny nodular foci of T2 and FLAIR  hyperintensity one measuring 4 mm in the anterior left subinsular white  matter. A tiny focus in the right frontal periventricular white matter  that are probably idiopathic white matter hyperintensities of no  significance. The remainder of the brain parenchyma is normal in signal  intensity. Specifically no diffusion weighted abnormality is identified  with no acute infarct identified. On the gradient echo T2 weighted  images I see no acute or old blood breakdown products intracranially.  Ventricles are normal in size. I see no focal mass effect and no midline  shift and no extra-axial fluid collections are identified. There is  minimal bilateral foraminal ethmoid sinus mucosal thickening and  inferior left maxillary sinus mucosal thickening. There is minimal right  sphenoid sinus mucosal thickening. The remainder of the paranasal  sinuses are clear. There is a tiny amount of fluid in the mastoid air  cells bilaterally. Good flow voids are demonstrated within the cerebral  vessels and in the dural venous sinuses. The calvarium and skull base  demonstrate normal marrow signal intensity. There is signal abnormality  in the right mandibular head with T1 low signal, some T2 high signal  felt to be degenerative marrow edema from arthritic changes in the right  temporomandibular joint.        Impression:      1. No acute intracranial abnormality is identified to account for the  patient's acute onset visual disturbance.  2. There are several  tiny nodular foci T2 high signal in the frontal  white matter that is felt to likely be idiopathic and of no  significance.  3. Mild bilateral medial frontal sinus and ethmoid sinus inferior left  maxillary sinus and right sphenoid sinus mucosal thickening. There is a  small amount of fluid in the mastoid air cells bilaterally.  4. There is marrow edema in the right mandibular head and neck that is  felt to be degenerative marrow edema from arthritic change in the right  temporomandibular joint.  4. The remainder of MRI of the brain is normal. Specifically, no acute  infarct is seen. The etiology of patient's acute onset visual  disturbance is not established on this exam.     This report was finalized on 4/27/2023 6:13 PM by Dr. Breezy Cummings M.D.       CT Angiogram Neck [[id number]] Collected: 04/26/23 2303     Updated: 04/26/23 2303    Narrative:        Patient: YANELY TOBAR  Time Out: 23:03  Exam(s): CTA NECK     EXAM:    CT Neck With Intravenous Contrast    CLINICAL HISTORY:     Reason for exam: Left eye vision changes.    TECHNIQUE:    Routine carotid CT protocol was performed with intravenous contrast.    NASCET criteria using the distal ICAs for comparison were used for   evaluation of stenoses.  CTDI is 115 mGy and DLP is 2725 mGy-cm.  This CT   exam was performed according to the principle of ALARA (As Low As   Reasonably Achievable) by using one or more of the following dose   reduction techniques: automated exposure control, adjustment of the mA   and or kV according to patient size, and or use of iterative   reconstruction technique.    COMPARISON:    None.    FINDINGS:     VASCULATURE:    Right common carotid artery:  Unremarkable.  No occlusion or   significant stenosis.  No dissection.    Right internal carotid artery: There is a 70% stenosis of the proximal   right ICA.  No dissection.    Right external carotid artery:  Unremarkable.  No occlusion.    Right vertebral artery:  Unremarkable.  No  occlusion or significant   stenosis.  No dissection.      Left common carotid artery: There is a 70% stenosis of the proximal   left ICA.  No dissection.    Left external carotid artery:  Unremarkable.  No occlusion.    Left vertebral artery:  Unremarkable.  No occlusion or significant   stenosis.  No dissection.     NECK:    Bones joints: Moderate to advanced disc degeneration at C3-4, C4-5, C5-  6 and C6-7 with a mild spinal canal stenosis at C6-7.  Remote fracture   deformities of T4 and T5.    Soft tissues: Prominent mediastinal and hilar lymph nodes.  Prominent   cervical lymph nodes.    Lung apices: Findings concerning for bronchitis with pneumonitis and   chronic interstitial scarring with moderate centrilobular emphysematous   changes.     CAROTID STENOSIS REFERENCE USING NASCET CRITERIA:    % ICA stenosis = (1 - narrowest ICA diameter diameter of distal   cervical ICA) x 100.    Mild - <50% stenosis.    Moderate - 50-69% stenosis.    Severe - 70-94% stenosis.    Near occlusion - 95-99% stenosis.    Occluded - 100% stenosis.    IMPRESSION:       There is a 70% stenosis of the proximal right and left ICA.      Impression:          Electronically signed by Stephanie Guzman MD on 04-26-23 at 2303    CT Angiogram Head [[id number]] Collected: 04/26/23 2256     Updated: 04/26/23 2256    Narrative:        Patient: YANELY TOBAR  Time Out: 22:55  Exam(s): CTA HEAD     EXAM:    CT Head With Intravenous Contrast    CLINICAL HISTORY:     Reason for exam: Left eye vision changes.    TECHNIQUE:  AI analysis of LVO was utilized    Axial computed tomographic images of the head with intravenous contrast.    CTDI is 115 mGy and DLP is 2725 mGy-cm.  This CT exam was performed   according to the principle of ALARA (As Low As Reasonably Achievable) by   using one or more of the following dose reduction techniques: automated   exposure control, adjustment of the mA and or kV according to patient   size, and or use of iterative  reconstruction technique.    COMPARISON:    No relevant prior studies available.    FINDINGS:    Right internal carotid artery:  No acute findings.  Intracranial   segment is patent with no significant stenosis.  No aneurysm.    Right anterior cerebral artery:  Unremarkable.  No occlusion or   significant stenosis.  No aneurysm.    Right middle cerebral artery:  Unremarkable.  No occlusion or   significant stenosis.  No aneurysm.    Right posterior cerebral artery:  Unremarkable.  No occlusion or   significant stenosis.  No aneurysm.    Right vertebral artery:  Unremarkable as visualized.      Left internal carotid artery:  No acute findings.  Intracranial segment   is patent with no significant stenosis.  No aneurysm.    Left anterior cerebral artery:  Unremarkable.  No occlusion or   significant stenosis.  No aneurysm.    Left middle cerebral artery:  Unremarkable.  No occlusion or   significant stenosis.  No aneurysm.    Left posterior cerebral artery:  Unremarkable.  No occlusion or   significant stenosis.  No aneurysm.    Left vertebral artery:  Unremarkable as visualized.      Basilar artery:  Unremarkable.  No occlusion or significant stenosis.    No aneurysm.    IMPRESSION:       Negative CT angiogram of the head.    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      Impression:          Electronically signed by Stephanie Guzman MD on 04-26-23 at 2255          Pertinent Labs     Results from last 7 days   Lab Units 04/29/23  0600 04/28/23  0709 04/27/23  0921 04/26/23 2023   WBC 10*3/mm3 13.43* 10.71 8.17 9.16   HEMOGLOBIN g/dL 10.4* 12.8* 11.9* 11.6*   PLATELETS 10*3/mm3 225 311 273 260     Results from last 7 days   Lab Units 04/29/23  0600 04/28/23  0709 04/27/23  0921 04/26/23 2023   SODIUM mmol/L 137 137 139 143   POTASSIUM mmol/L 4.2 4.5 4.0 4.0   CHLORIDE mmol/L 108* 102 105 106   CO2 mmol/L 23.3 24.6 26.5 25.6   BUN mg/dL 13 13 10 14    CREATININE mg/dL 0.68* 0.91 0.93 0.93   GLUCOSE mg/dL 103* 96 128* 122*   Estimated Creatinine Clearance: 134.4 mL/min (A) (by C-G formula based on SCr of 0.68 mg/dL (L)).  Results from last 7 days   Lab Units 04/29/23  0600 04/28/23  0709 04/27/23  0921 04/26/23 2023   ALBUMIN g/dL 3.3* 4.3 3.9 4.1   BILIRUBIN mg/dL 0.3 0.4 0.4 0.3   ALK PHOS U/L 55 73 70 67   AST (SGOT) U/L 11 15 13 12   ALT (SGPT) U/L 11 10 12 8     Results from last 7 days   Lab Units 04/29/23  0600 04/28/23  0709 04/27/23 0921 04/26/23 2023   CALCIUM mg/dL 8.8 9.7 9.5 9.4   ALBUMIN g/dL 3.3* 4.3 3.9 4.1           Results from last 7 days   Lab Units 04/27/23  0921   CHOLESTEROL mg/dL 199   TRIGLYCERIDES mg/dL 90   HDL CHOL mg/dL 50   LDL CHOL mg/dL 133*           Test Results Pending at Discharge       Discharge Details        Discharge Medications      New Medications      Instructions Start Date   aspirin 81 MG chewable tablet   81 mg, Oral, Daily   Start Date: April 30, 2023     atorvastatin 80 MG tablet  Commonly known as: LIPITOR   80 mg, Oral, Nightly      nicotine 21 MG/24HR patch  Commonly known as: Nicoderm CQ   1 patch, Transdermal, Every 24 Hours      nicotine 14 MG/24HR patch  Commonly known as: Nicoderm CQ   1 patch, Transdermal, Every 24 Hours      nicotine 7 MG/24HR patch  Commonly known as: Nicoderm CQ   1 patch, Transdermal, Every 24 Hours      nicotine polacrilex 4 MG gum  Commonly known as: Nicorette   4 mg, Mouth/Throat, As Needed         Changes to Medications      Instructions Start Date   HYDROcodone-acetaminophen  MG per tablet  Commonly known as: SERGIOCO  What changed: Another medication with the same name was added. Make sure you understand how and when to take each.   hydrocodone 10 mg-acetaminophen 325 mg tablet      HYDROcodone-acetaminophen 5-325 MG per tablet  Commonly known as: Tiffany  What changed: You were already taking a medication with the same name, and this prescription was added. Make sure you  understand how and when to take each.   1-2 tablets, Oral, Every 6 Hours PRN         Continue These Medications      Instructions Start Date   gabapentin 600 MG tablet  Commonly known as: NEURONTIN   Every 24 Hours             No Known Allergies      Discharge Disposition:  Home or Self Care    Discharge Diet:  Diet Order   Procedures   • Diet: Cardiac Diets, Diabetic Diets; Healthy Heart (2-3 Na+); Consistent Carbohydrate; Texture: Regular Texture (IDDSI 7); Fluid Consistency: Thin (IDDSI 0)       Discharge Activity:   Activity Instructions     Activity as Tolerated            CODE STATUS:    Code Status and Medical Interventions:   Ordered at: 04/28/23 7915     Level Of Support Discussed With:    Patient     Code Status (Patient has no pulse and is not breathing):    CPR (Attempt to Resuscitate)     Medical Interventions (Patient has pulse or is breathing):    Full Support     Release to patient:    Routine Release       Future Appointments   Date Time Provider Department Center   5/3/2023  2:30 PM Geraldo Morris DO [facility]     Additional Instructions for the Follow-ups that You Need to Schedule     Call MD With Problems / Concerns   As directed      Instructions: return to the hospital if you experience chest pain, shortness of breath, abdominal pain, nausea, vomiting, fevers, sweats, chills, or worsening of your symptoms    Order Comments: Instructions: return to the hospital if you experience chest pain, shortness of breath, abdominal pain, nausea, vomiting, fevers, sweats, chills, or worsening of your symptoms          Discharge Follow-up with PCP   As directed       Currently Documented PCP:    Provider, No Known    PCP Phone Number:    [phone]     Follow Up Details: 2 weeks         Discharge Follow-up with Specialty: cardiology, as directed   As directed      Specialty: cardiology, as directed         Discharge Follow-up with Specified Provider: neurology 3 months   As directed      To: neurology  3 months         Discharge Follow-up with Specified Provider: vascular surgery, 2 weeks   As directed      To: vascular surgery, 2 weeks            Follow-up Information     Ariel Quiros MD Follow up in 1 week(s).    Specialty: Vascular Surgery  Why: For wound re-check  Contact information:  [address]  [facility] [id number] [phone]             Provider, No Known .    Why: 2 weeks  Contact information:  [facility] [address]  [phone]                         Additional Instructions for the Follow-ups that You Need to Schedule     Call MD With Problems / Concerns   As directed      Instructions: return to the hospital if you experience chest pain, shortness of breath, abdominal pain, nausea, vomiting, fevers, sweats, chills, or worsening of your symptoms    Order Comments: Instructions: return to the hospital if you experience chest pain, shortness of breath, abdominal pain, nausea, vomiting, fevers, sweats, chills, or worsening of your symptoms          Discharge Follow-up with PCP   As directed       Currently Documented PCP:    Provider, No Known    PCP Phone Number:    [phone]     Follow Up Details: 2 weeks         Discharge Follow-up with Specialty: cardiology, as directed   As directed      Specialty: cardiology, as directed         Discharge Follow-up with Specified Provider: neurology 3 months   As directed      To: neurology 3 months         Discharge Follow-up with Specified Provider: vascular surgery, 2 weeks   As directed      To: vascular surgery, 2 weeks           Time Spent on Discharge:  Greater than 30 minutes      Gunnar Navarro MD  [facility]  04/29/23  13:52 EDT              Electronically signed by Gunnar Navarro MD at 04/29/23 3094

## 2023-05-03 ENCOUNTER — OFFICE VISIT (OUTPATIENT)
Dept: INTERNAL MEDICINE | Facility: CLINIC | Age: 63
End: 2023-05-03
Payer: COMMERCIAL

## 2023-05-03 VITALS
WEIGHT: 194 LBS | TEMPERATURE: 97.1 F | HEIGHT: 70 IN | SYSTOLIC BLOOD PRESSURE: 130 MMHG | DIASTOLIC BLOOD PRESSURE: 88 MMHG | OXYGEN SATURATION: 99 % | BODY MASS INDEX: 27.77 KG/M2 | HEART RATE: 91 BPM

## 2023-05-03 DIAGNOSIS — Q21.12 PFO (PATENT FORAMEN OVALE): ICD-10-CM

## 2023-05-03 DIAGNOSIS — I65.23 BILATERAL CAROTID ARTERY STENOSIS: ICD-10-CM

## 2023-05-03 DIAGNOSIS — D50.9 IRON DEFICIENCY ANEMIA, UNSPECIFIED IRON DEFICIENCY ANEMIA TYPE: ICD-10-CM

## 2023-05-03 DIAGNOSIS — Z09 HOSPITAL DISCHARGE FOLLOW-UP: Primary | ICD-10-CM

## 2023-05-03 DIAGNOSIS — Z12.5 PROSTATE CANCER SCREENING: ICD-10-CM

## 2023-05-03 DIAGNOSIS — Z11.59 NEED FOR HEPATITIS C SCREENING TEST: ICD-10-CM

## 2023-05-03 DIAGNOSIS — H34.9 RETINAL ARTERY OCCLUSION: ICD-10-CM

## 2023-05-03 DIAGNOSIS — D64.9 NORMOCYTIC ANEMIA: ICD-10-CM

## 2023-05-03 NOTE — PROGRESS NOTES
"Transitional Care Follow Up Visit  Subjective     Rito Arnold is a 62 y.o. male who presents for a transitional care management visit. Here as a new patient today. Has not had primary care since 2018.     Within 48 business hours after discharge our office contacted him via telephone to coordinate his care and needs.      I reviewed and discussed the details of that call along with the discharge summary, hospital problems, inpatient lab results, inpatient diagnostic studies, and consultation reports with Rito.     Current outpatient and discharge medications have been reconciled for the patient.  Reviewed by: Geraldo Morris DO          4/29/2023     9:59 PM   Date of TCM Phone Call   [facility]   Date of Admission 4/26/2023   Date of Discharge 4/29/2023   Discharge Disposition Home or Self Care     Risk for Readmission (LACE) Score: 9 (4/29/2023  6:00 AM)      History of Present Illness   Course During Hospital Stay:      Per discharge summary   \"Mr. Arnold is a 62 y.o. male with a history of tobacco dependence, chronic pain who presented to [facility] initially complaining of cloudy vision in his left eye.  He saw his ophthalmologist who diagnosed a retinal artery occlusion of the left eye, and then he was referred to the emergency department for stroke work-up.  Please see the admitting history and physical for further details.       He was seen in consultation by neurology and vascular surgery.  Brain MRI was negative for an acute stroke.  A CTA of the head and neck showed 70% stenosis of the proximal right and left ICA.  Vascular surgery took him for left carotid endarterectomy on 4/28/2023.  He tolerated the procedure well.  He has been started on a baby aspirin and high intensity statin.  An echocardiogram did show a small PFO, and a referral to cardiology will be placed at discharge.  He will need to follow-up with neurology in 3 months, and vascular surgery in 2 weeks.  I have also " "ordered him nicotine patches and gum to help him quit smoking.\"    Has quit smoking cold turkey. Taking aspirin 81 mg daily and atorvastatin 80 mg daily.     The following portions of the patient's history were reviewed and updated as appropriate: allergies, current medications, past family history, past medical history, past social history, past surgical history and problem list.        Objective   Physical Exam  Vitals reviewed.   Constitutional:       General: He is not in acute distress.     Appearance: Normal appearance. He is not ill-appearing.   HENT:      Head: Atraumatic.   Eyes:      General: No scleral icterus.  Neck:     Cardiovascular:      Rate and Rhythm: Normal rate and regular rhythm.      Heart sounds: Normal heart sounds. No murmur heard.  Pulmonary:      Effort: Pulmonary effort is normal. No respiratory distress.      Breath sounds: Wheezing (mild) and rhonchi (mild) present.   Musculoskeletal:      Right lower leg: No edema.      Left lower leg: No edema.   Skin:     Coloration: Skin is not jaundiced.   Neurological:      Mental Status: He is alert.   Psychiatric:         Mood and Affect: Mood normal.         Behavior: Behavior normal.         Thought Content: Thought content normal.         Assessment & Plan   Diagnoses and all orders for this visit:    1. Hospital discharge follow-up (Primary)  2. Retinal artery occlusion  3. PFO (patent foramen ovale)  4. Bilateral carotid artery stenosis  5. Normocytic anemia  -Patient here today for establishing care and for hospital discharge follow-up.  Patient has a history of tobacco dependence but has quit cold turkey.  He also has a history of chronic pain syndrome with his low back for which he follows with [facility] pain and receives Norco 10.  He was hospitalized at [facility] to 4/26/2023 to 4/29/2023 after being diagnosed with retinal artery occlusion of the left eye.  I reviewed the progress note which shows he was seen by " neurology and vascular surgery.  Brain MRI was negative for acute stroke but a CT of the head and neck showed 70% stenosis of the proximal right and left ICA.  He underwent left carotid endarterectomy while in the hospital.  He was started on aspirin 81 mg daily as well as atorvastatin 80 mg daily and he is compliant with both medications.  Echocardiogram did show a small PFO.  He is aware of discharge follow-ups with neurology, vascular surgery and cardiology.  -I reviewed hospital discharge labs and of note, patient had a mild normocytic anemia upon admission to the hospital, With hemoglobin of 11.6, RBC 3.93, MCV normal 86.  WBC and platelet count normal.  -At this point I will perform repeat CBC as well as anemia work-up with B12, folate, ferritin and iron profile.  He had a TSH in the hospital which was normal.  Further plan for his anemia depending upon results.  -Otherwise, his surgical site for his endarterectomy is healing which is great.  I will ensure that his discharge follow-ups with subspecialists are in place.  He will return in 4 weeks for annual physical to continue developing his care plan.  -     Vitamin B12  -     Folate  -     Ferritin  -     Iron Profile  -     CBC w AUTO Differential    6. Prostate cancer screening  -     PSA SCREENING    7. Need for hepatitis C screening test  -     Hepatitis C antibody

## 2023-05-04 LAB
BASOPHILS # BLD AUTO: 0.03 10*3/MM3 (ref 0–0.2)
BASOPHILS NFR BLD AUTO: 0.3 % (ref 0–1.5)
EOSINOPHIL # BLD AUTO: 0.13 10*3/MM3 (ref 0–0.4)
EOSINOPHIL NFR BLD AUTO: 1.4 % (ref 0.3–6.2)
ERYTHROCYTE [DISTWIDTH] IN BLOOD BY AUTOMATED COUNT: 14.9 % (ref 12.3–15.4)
FERRITIN SERPL-MCNC: 13.5 NG/ML (ref 30–400)
FOLATE SERPL-MCNC: 8.86 NG/ML (ref 4.78–24.2)
HCT VFR BLD AUTO: 34.8 % (ref 37.5–51)
HCV IGG SERPL QL IA: NON REACTIVE
HGB BLD-MCNC: 11.6 G/DL (ref 13–17.7)
IMM GRANULOCYTES # BLD AUTO: 0.07 10*3/MM3 (ref 0–0.05)
IMM GRANULOCYTES NFR BLD AUTO: 0.7 % (ref 0–0.5)
IRON SATN MFR SERPL: 8 % (ref 20–50)
IRON SERPL-MCNC: 41 MCG/DL (ref 59–158)
LYMPHOCYTES # BLD AUTO: 2.44 10*3/MM3 (ref 0.7–3.1)
LYMPHOCYTES NFR BLD AUTO: 25.8 % (ref 19.6–45.3)
MCH RBC QN AUTO: 29.6 PG (ref 26.6–33)
MCHC RBC AUTO-ENTMCNC: 33.3 G/DL (ref 31.5–35.7)
MCV RBC AUTO: 88.8 FL (ref 79–97)
MONOCYTES # BLD AUTO: 0.65 10*3/MM3 (ref 0.1–0.9)
MONOCYTES NFR BLD AUTO: 6.9 % (ref 5–12)
NEUTROPHILS # BLD AUTO: 6.12 10*3/MM3 (ref 1.7–7)
NEUTROPHILS NFR BLD AUTO: 64.9 % (ref 42.7–76)
NRBC BLD AUTO-RTO: 0 /100 WBC (ref 0–0.2)
PLATELET # BLD AUTO: 285 10*3/MM3 (ref 140–450)
PSA SERPL-MCNC: 0.27 NG/ML (ref 0–4)
RBC # BLD AUTO: 3.92 10*6/MM3 (ref 4.14–5.8)
TIBC SERPL-MCNC: 523 MCG/DL
UIBC SERPL-MCNC: 482 MCG/DL (ref 112–346)
VIT B12 SERPL-MCNC: 402 PG/ML (ref 211–946)
WBC # BLD AUTO: 9.44 10*3/MM3 (ref 3.4–10.8)

## 2023-05-04 RX ORDER — FERROUS SULFATE 325(65) MG
325 TABLET ORAL EVERY OTHER DAY
Qty: 45 TABLET | Refills: 1 | Status: SHIPPED | OUTPATIENT
Start: 2023-05-04

## 2023-05-05 ENCOUNTER — TELEPHONE (OUTPATIENT)
Dept: GASTROENTEROLOGY | Facility: CLINIC | Age: 63
End: 2023-05-05

## 2023-05-05 NOTE — TELEPHONE ENCOUNTER
Hub staff attempted to follow warm transfer process and was unsuccessful     Caller: Rito Arnold    Relationship to patient: Self    Best call back number: [phone]    Patient is needing:   PATIENT IS RETURNING CALL FROM [facility] TO SCHEDULE APPOINTMENT WITH DR. CORONEL.   BEST TIME TO REACH PATIENT IS EARLY MORNING.  PLEASE ALL BACK ASAP.

## 2023-05-09 ENCOUNTER — READMISSION MANAGEMENT (OUTPATIENT)
Dept: CALL CENTER | Facility: HOSPITAL | Age: 63
End: 2023-05-09
Payer: COMMERCIAL

## 2023-05-09 NOTE — OUTREACH NOTE
Medical Week 2 Survey    Flowsheet Row Responses   [facility] patient discharged from? [place]   Does the patient have one of the following disease processes/diagnoses(primary or secondary)? Other   Week 2 attempt successful? Yes   Call start time 0852   Discharge diagnosis Retinal artery occlusion-Left carotid endarterectomy   Call end time 0853   Person spoke with today (if not patient) and relationship Spouse- Angela Elias reviewed with patient/caregiver? Yes   Is the patient taking all medications as directed (includes completed medication regime)? Yes   Does the patient have a primary care provider?  Yes   Comments regarding PCP Has seen his pcp- and he will see vascular provider this thurs.   Has the patient kept scheduled appointments due by today? Yes   Has home health visited the patient within 72 hours of discharge? N/A   Psychosocial issues? No   Did the patient receive a copy of their discharge instructions? Yes   Nursing interventions Reviewed instructions with patient   What is the patient's perception of their health status since discharge? Improving   Is the patient/caregiver able to teach back signs and symptoms related to disease process for when to call PCP? Yes   Is the patient/caregiver able to teach back signs and symptoms related to disease process for when to call 911? Yes   Is the patient/caregiver able to teach back the hierarchy of who to call/visit for symptoms/problems? PCP, Specialist, Home health nurse, Urgent Care, ED, 911 Yes   Week 2 Call Completed? Yes   Graduated Yes   Wrap up additional comments Per spouse patient is doing very well- has seen his pcp no concerns or questions noted.          Anastasiya CANELA - Registered Nurse

## 2023-05-18 ENCOUNTER — OFFICE VISIT (OUTPATIENT)
Dept: CARDIOLOGY | Facility: CLINIC | Age: 63
End: 2023-05-18
Payer: COMMERCIAL

## 2023-05-18 VITALS
BODY MASS INDEX: 28.55 KG/M2 | WEIGHT: 199.4 LBS | HEART RATE: 80 BPM | HEIGHT: 70 IN | DIASTOLIC BLOOD PRESSURE: 90 MMHG | SYSTOLIC BLOOD PRESSURE: 150 MMHG

## 2023-05-18 DIAGNOSIS — R03.0 ELEVATED BLOOD PRESSURE READING IN OFFICE WITHOUT DIAGNOSIS OF HYPERTENSION: ICD-10-CM

## 2023-05-18 DIAGNOSIS — Z87.891 HISTORY OF TOBACCO ABUSE: ICD-10-CM

## 2023-05-18 DIAGNOSIS — Q21.12 PATENT FORAMEN OVALE: Primary | ICD-10-CM

## 2023-05-18 DIAGNOSIS — I65.23 BILATERAL CAROTID ARTERY STENOSIS: Chronic | ICD-10-CM

## 2023-05-18 PROCEDURE — 99204 OFFICE O/P NEW MOD 45 MIN: CPT | Performed by: INTERNAL MEDICINE

## 2023-05-18 PROCEDURE — 93000 ELECTROCARDIOGRAM COMPLETE: CPT | Performed by: INTERNAL MEDICINE

## 2023-05-18 NOTE — PROGRESS NOTES
[facility]      Patient Name: Rito Arnold  :1960  Age: 62 y.o.  Encounter Provider:  Vu Roper Jr, MD      Chief Complaint: Hospital follow-up for retinal artery occlusion and peripheral arterial disease/carotid stenosis      HPI  Rito Arnold is a 62 y.o. male past medical history of tobacco abuse who presented to his ophthalmologist with visual field deficit.  He never lost vision in that eye.  His ophthalmologist was concerned for an occlusion of a retinal branch and he was sent to [facility] emergency room for work-up.  MRI was negative for acute infarct.  CTA head and neck showed 70% stenosis of the left and right carotid arteries.  Patient was taken for CEA of left carotid which was an uncomplicated surgery.  An echocardiogram was performed showing a small PFO, normal ejection fraction and no significant valvular heart disease.  Patient was referred to cardiology clinic for evaluation of PFO.  He is a fairly active gentleman with no chest pain or shortness of air.  No orthopnea or PND.  He notes some mild lower extremity edema at the end of the day which resolves after sleep.  No palpitations, dizziness or syncope.  EKG today in clinic shows sinus rhythm.  Family history positive for father having coronary artery disease requiring surgical bypass in his 50s.  He smoked until the day of admission and has been abstinent since.  Social alcohol use and denies illicit drug use.      The following portions of the patient's history were reviewed and updated as appropriate: allergies, current medications, past family history, past medical history, past social history, past surgical history and problem list.      Review of Systems   Constitutional: Negative for chills and fever.   HENT: Negative for hoarse voice and sore throat.    Eyes: Negative for double vision and photophobia.   Cardiovascular: Negative for chest pain, leg swelling, near-syncope, orthopnea, palpitations, paroxysmal  "nocturnal dyspnea and syncope.   Respiratory: Negative for cough and wheezing.    Skin: Negative for poor wound healing and rash.   Musculoskeletal: Negative for arthritis and joint swelling.   Gastrointestinal: Negative for bloating, abdominal pain, hematemesis and hematochezia.   Neurological: Negative for dizziness and focal weakness.   Psychiatric/Behavioral: Negative for depression and suicidal ideas.       OBJECTIVE:   Vital Signs  Vitals:    05/18/23 0831   BP: 150/90   Pulse: 80     Estimated body mass index is 28.61 kg/m² as calculated from the following:    Height as of this encounter: 177.8 cm (70\").    Weight as of this encounter: 90.4 kg (199 lb 6.4 oz).    Vitals reviewed.   Constitutional:       Appearance: Healthy appearance. Not in distress.   Neck:      Vascular: No JVR. JVD normal.   Pulmonary:      Effort: Pulmonary effort is normal.      Breath sounds: Normal breath sounds. No wheezing. No rhonchi. No rales.   Chest:      Chest wall: Not tender to palpatation.   Cardiovascular:      PMI at left midclavicular line. Normal rate. Regular rhythm. Normal S1. Normal S2.      Murmurs: There is no murmur.      No gallop. No click. No rub.   Pulses:     Intact distal pulses.   Edema:     Peripheral edema absent.   Abdominal:      General: Bowel sounds are normal.      Palpations: Abdomen is soft.      Tenderness: There is no abdominal tenderness.   Musculoskeletal: Normal range of motion.         General: No tenderness. Skin:     General: Skin is warm and dry.   Neurological:      General: No focal deficit present.      Mental Status: Alert and oriented to person, place and time.           ECG 12 Lead    Date/Time: 5/18/2023 9:14 AM  Performed by: Vu Rpoer Jr., MD  Authorized by: Vu Roper Jr., MD   Comparison: compared with previous ECG from 4/27/2023  Similar to previous ECG  Rhythm: sinus rhythm    Clinical impression: normal ECG            Lipid Panel        4/27/2023    09:21   Lipid " Panel   Total Cholesterol 199     Triglycerides 90     HDL Cholesterol 50     VLDL Cholesterol 16     LDL Cholesterol  133     LDL/HDL Ratio 2.62          BUN   Date Value Ref Range Status   04/29/2023 13 8 - 23 mg/dL Final     Creatinine   Date Value Ref Range Status   04/29/2023 0.68 (L) 0.76 - 1.27 mg/dL Final     Potassium   Date Value Ref Range Status   04/29/2023 4.2 3.5 - 5.2 mmol/L Final     ALT (SGPT)   Date Value Ref Range Status   04/29/2023 11 1 - 41 U/L Final     AST (SGOT)   Date Value Ref Range Status   04/29/2023 11 1 - 40 U/L Final           ASSESSMENT:     62-year-old male with bilateral carotid stenosis, concern for occlusion of retinal artery branch, remote history of tobacco abuse presents for evaluation of patent foramen ovale    PLAN OF CARE:     1. PFO -no infarct on MRI.  Severe carotid atherosclerosis noted status post CEA.  No indication for closure at this time.  2. Carotid stenosis -status post left CEA with no complications.  Following with vascular surgery.  Continue aspirin and statin.  3. Family history coronary artery disease -we had a long discussion about coronary calcium scoring as risk stratification given his father's history.  He will do some research and let me know if he would like to move forward with screening.  4. Elevated blood pressure without diagnosis of hypertension -twice daily blood pressure log to be sent in after 1 week.    Return to clinic 6 months             Discharge Medications          Accurate as of May 18, 2023  8:37 AM. If you have any questions, ask your nurse or doctor.            Continue These Medications      Instructions Start Date   aspirin 81 MG chewable tablet   81 mg, Oral, Daily      atorvastatin 80 MG tablet  Commonly known as: LIPITOR   80 mg, Oral, Nightly      ferrous sulfate 325 (65 FE) MG tablet   325 mg, Oral, Every Other Day      HYDROcodone-acetaminophen  MG per tablet  Commonly known as: NORCO   hydrocodone 10 mg-acetaminophen  325 mg tablet             Thank you for allowing me to participate in the care of your patient,      Sincerely,   Vu Roper MD  [facility]  05/18/23  08:37 EDT

## 2023-06-15 ENCOUNTER — TELEPHONE (OUTPATIENT)
Dept: CARDIOLOGY | Facility: CLINIC | Age: 63
End: 2023-06-15
Payer: COMMERCIAL

## 2023-06-15 RX ORDER — AMLODIPINE BESYLATE 5 MG/1
5 TABLET ORAL DAILY
Qty: 30 TABLET | Refills: 11 | Status: SHIPPED | OUTPATIENT
Start: 2023-06-15

## 2023-06-15 NOTE — TELEPHONE ENCOUNTER
Left a message that blood pressure is poorly controlled on home log and we will need to start a medication.  Starting amlodipine 5 mg.  I recommended that the patient call the office if there was a problem with starting this medication.

## 2023-06-19 ENCOUNTER — OFFICE VISIT (OUTPATIENT)
Dept: INTERNAL MEDICINE | Facility: CLINIC | Age: 63
End: 2023-06-19
Payer: COMMERCIAL

## 2023-06-19 VITALS
TEMPERATURE: 97.9 F | SYSTOLIC BLOOD PRESSURE: 160 MMHG | DIASTOLIC BLOOD PRESSURE: 100 MMHG | WEIGHT: 200 LBS | HEIGHT: 70 IN | BODY MASS INDEX: 28.63 KG/M2 | HEART RATE: 101 BPM | OXYGEN SATURATION: 98 %

## 2023-06-19 DIAGNOSIS — I10 ESSENTIAL HYPERTENSION: ICD-10-CM

## 2023-06-19 DIAGNOSIS — D50.9 IRON DEFICIENCY ANEMIA, UNSPECIFIED IRON DEFICIENCY ANEMIA TYPE: Primary | ICD-10-CM

## 2023-06-19 PROCEDURE — 99214 OFFICE O/P EST MOD 30 MIN: CPT | Performed by: STUDENT IN AN ORGANIZED HEALTH CARE EDUCATION/TRAINING PROGRAM

## 2023-06-19 NOTE — PROGRESS NOTES
"  Geraldo Morris D.O.  Internal Medicine  [facility]  [address], [address]  [place] [address]  [phone]      Chief Complaint  Hypertension    SUBJECTIVE    History of Present Illness    Rito Arnold is a 62 y.o. male who presents to the office today as an established patient that last saw me on 5/3/2023.     HTN: cardiology has recently started amlodipine 5 mg daily, states he took his first pill today    Iron deficiency anemia: started ferrous sulfate every other day as prescribed at last visit; states he has an appointment for GI consultation coming up      No Known Allergies     Outpatient Medications Marked as Taking for the 6/19/23 encounter (Office Visit) with Geraldo Morris, DO   Medication Sig Dispense Refill   • amLODIPine (NORVASC) 5 MG tablet Take 1 tablet by mouth Daily. 30 tablet 11   • aspirin 81 MG chewable tablet Chew 1 tablet Daily. 90 tablet 0   • atorvastatin (LIPITOR) 80 MG tablet Take 1 tablet by mouth Every Night. 90 tablet 0   • ferrous sulfate 325 (65 FE) MG tablet Take 1 tablet by mouth Every Other Day. 45 tablet 1   • HYDROcodone-acetaminophen (NORCO)  MG per tablet hydrocodone 10 mg-acetaminophen 325 mg tablet          Past Medical History:   Diagnosis Date   • Carotid artery stenosis    • Chronic back pain    • HTN (hypertension)    • Hyperlipidemia    • PFO (patent foramen ovale)    • Retinal artery occlusion 2023    left   • Tobacco dependence        OBJECTIVE    Vital Signs:   /100   Pulse 101   Temp 97.9 °F (36.6 °C) (Infrared)   Ht 177.8 cm (70\")   Wt 90.7 kg (200 lb)   SpO2 98%   BMI 28.70 kg/m²     Physical Exam  Vitals reviewed.   Constitutional:       General: He is not in acute distress.     Appearance: Normal appearance. He is not ill-appearing.   HENT:      Head: Atraumatic.   Eyes:      General: No scleral icterus.  Cardiovascular:      Rate and Rhythm: Normal rate and regular rhythm.      Heart sounds: Normal heart sounds. No " murmur heard.  Pulmonary:      Effort: Pulmonary effort is normal. No respiratory distress.      Breath sounds: Normal breath sounds. No stridor. No wheezing or rhonchi.   Musculoskeletal:      Right lower leg: No edema.      Left lower leg: No edema.   Skin:     Coloration: Skin is not jaundiced.   Neurological:      Mental Status: He is alert.   Psychiatric:         Mood and Affect: Mood normal.         Behavior: Behavior normal.         Thought Content: Thought content normal.                             ASSESSMENT & PLAN     Diagnoses and all orders for this visit:    1. Iron deficiency anemia, unspecified iron deficiency anemia type (Primary)  -at last visit he was found to have iron deficiency anemia with hemoglobin 10.4.  Ferritin was low at 13.5 and iron saturation was low at 8%.  At that point he was referred to gastroenterology for consideration of endoscopy and he has initial consultation scheduled.  This could be iatrogenic given carotid endarterectomy in 4/2023 but either way he is past due for colonoscopy so a consult would be beneficial.  -recheck CBC, iron profile, ferritin today to assess for improvement   -     CBC w AUTO Differential  -     Ferritin  -     Iron Profile    2. Essential hypertension  -started amlodipine 5 mg daily today by cardiology  -BP  Uncontrolled  160/100 in office today  -I suspect he will need multiple antihypertensives to get full control  -will defer to cardiology at this time since they are having him send in BP logs but I will also monitor as his regimen is adjusted  Lab Results   Component Value Date    GLUCOSE 103 (H) 04/29/2023    CALCIUM 8.8 04/29/2023     04/29/2023    K 4.2 04/29/2023    CO2 23.3 04/29/2023     (H) 04/29/2023    BUN 13 04/29/2023    CREATININE 0.68 (L) 04/29/2023    EGFR 103.8 04/29/2023    BCR 19.1 04/29/2023    ANIONGAP 5.7 04/29/2023               The following social determinates of health impact the patient's medical decision  making: No social determinates of health were factored in to today's visit.     Follow Up  Return in about 2 months (around 8/19/2023) for Annual physical.    Patient/family had no further questions at this time and verbalized understanding of the plan discussed today.

## 2023-06-20 PROBLEM — Q21.12 PFO (PATENT FORAMEN OVALE): Status: ACTIVE | Noted: 2023-06-20

## 2023-06-20 PROBLEM — E78.5 HYPERLIPIDEMIA: Status: ACTIVE | Noted: 2023-06-20

## 2023-06-20 PROBLEM — M54.9 CHRONIC BACK PAIN: Status: ACTIVE | Noted: 2023-04-27

## 2023-06-20 PROBLEM — H34.9 RETINAL ARTERY OCCLUSION: Status: ACTIVE | Noted: 2023-01-01

## 2023-06-20 PROBLEM — G89.29 CHRONIC PAIN: Status: RESOLVED | Noted: 2023-04-27 | Resolved: 2023-06-20

## 2023-06-20 PROBLEM — Z72.0 TOBACCO ABUSE: Status: RESOLVED | Noted: 2023-04-27 | Resolved: 2023-06-20

## 2023-06-20 PROBLEM — I65.29 CAROTID ARTERY STENOSIS: Status: ACTIVE | Noted: 2023-06-20

## 2023-06-20 PROBLEM — J44.9 COPD (CHRONIC OBSTRUCTIVE PULMONARY DISEASE): Chronic | Status: RESOLVED | Noted: 2023-04-27 | Resolved: 2023-06-20

## 2023-06-20 PROBLEM — I10 HTN (HYPERTENSION): Status: ACTIVE | Noted: 2023-06-20

## 2023-06-20 PROBLEM — I65.29 CAROTID ARTERY STENOSIS: Status: RESOLVED | Noted: 2023-06-20 | Resolved: 2023-06-20

## 2023-06-20 PROBLEM — F17.200 TOBACCO DEPENDENCE: Status: ACTIVE | Noted: 2023-06-20

## 2023-06-20 LAB
BASOPHILS # BLD AUTO: 0 X10E3/UL (ref 0–0.2)
BASOPHILS NFR BLD AUTO: 1 %
EOSINOPHIL # BLD AUTO: 0.3 X10E3/UL (ref 0–0.4)
EOSINOPHIL NFR BLD AUTO: 4 %
ERYTHROCYTE [DISTWIDTH] IN BLOOD BY AUTOMATED COUNT: 17 % (ref 11.6–15.4)
FERRITIN SERPL-MCNC: 31 NG/ML (ref 30–400)
HCT VFR BLD AUTO: 37.3 % (ref 37.5–51)
HGB BLD-MCNC: 12.6 G/DL (ref 13–17.7)
IMM GRANULOCYTES # BLD AUTO: 0 X10E3/UL (ref 0–0.1)
IMM GRANULOCYTES NFR BLD AUTO: 0 %
IRON SATN MFR SERPL: 14 % (ref 15–55)
IRON SERPL-MCNC: 52 UG/DL (ref 38–169)
LYMPHOCYTES # BLD AUTO: 1.8 X10E3/UL (ref 0.7–3.1)
LYMPHOCYTES NFR BLD AUTO: 30 %
MCH RBC QN AUTO: 30.1 PG (ref 26.6–33)
MCHC RBC AUTO-ENTMCNC: 33.8 G/DL (ref 31.5–35.7)
MCV RBC AUTO: 89 FL (ref 79–97)
MONOCYTES # BLD AUTO: 0.4 X10E3/UL (ref 0.1–0.9)
MONOCYTES NFR BLD AUTO: 7 %
NEUTROPHILS # BLD AUTO: 3.5 X10E3/UL (ref 1.4–7)
NEUTROPHILS NFR BLD AUTO: 58 %
PLATELET # BLD AUTO: 237 X10E3/UL (ref 150–450)
RBC # BLD AUTO: 4.19 X10E6/UL (ref 4.14–5.8)
TIBC SERPL-MCNC: 385 UG/DL (ref 250–450)
UIBC SERPL-MCNC: 333 UG/DL (ref 111–343)
WBC # BLD AUTO: 6 X10E3/UL (ref 3.4–10.8)

## 2023-06-30 PROBLEM — D50.9 IRON DEFICIENCY ANEMIA: Status: ACTIVE | Noted: 2023-06-30

## 2023-06-30 NOTE — H&P (VIEW-ONLY)
Chief Complaint   Patient presents with    Anemia     Subjective   HPI  Rito Arnold is a 62 y.o. male who presents today for new patient evaluation.    Mild iron deficiency anemia with has improved with iron supplementation.  Most recent iron indices have normalized. NO overt GI bleeding.  No Gi complaints.  Remote colonoscopy >10yrs ago    Objective   Vitals:    06/30/23 0808   BP: 139/92   Pulse: 88   Temp: 97.1 °F (36.2 °C)   SpO2: 93%     Physical Exam  Vitals reviewed.   Constitutional:       Appearance: He is well-developed.   HENT:      Head: Normocephalic and atraumatic.   Neurological:      Mental Status: He is alert and oriented to person, place, and time.   Psychiatric:         Behavior: Behavior normal.         Thought Content: Thought content normal.         Judgment: Judgment normal.            Assessment & Plan   Assessment:     1. Iron deficiency anemia, unspecified iron deficiency anemia type      Plan:   Mild iron deficiency responding appropriately to supplementation.  No overt GI bleeding or GI sx to speak of.  He is over due for colonoscopy, will also scheduled EGD at same time as well          Bethel Uriarte M.D.  [facility]  [address] [place] [address] [address] [place] [address]  Office: [phone]

## 2023-07-27 ENCOUNTER — ANESTHESIA EVENT (OUTPATIENT)
Dept: GASTROENTEROLOGY | Facility: HOSPITAL | Age: 63
End: 2023-07-27
Payer: COMMERCIAL

## 2023-07-28 ENCOUNTER — ANESTHESIA (OUTPATIENT)
Dept: GASTROENTEROLOGY | Facility: HOSPITAL | Age: 63
End: 2023-07-28
Payer: COMMERCIAL

## 2023-07-28 ENCOUNTER — HOSPITAL ENCOUNTER (OUTPATIENT)
Facility: HOSPITAL | Age: 63
Setting detail: HOSPITAL OUTPATIENT SURGERY
Discharge: HOME | End: 2023-07-28
Attending: INTERNAL MEDICINE | Admitting: INTERNAL MEDICINE
Payer: COMMERCIAL

## 2023-07-28 VITALS
WEIGHT: 197 LBS | BODY MASS INDEX: 28.2 KG/M2 | HEIGHT: 70 IN | SYSTOLIC BLOOD PRESSURE: 115 MMHG | DIASTOLIC BLOOD PRESSURE: 69 MMHG | OXYGEN SATURATION: 95 % | HEART RATE: 82 BPM | RESPIRATION RATE: 17 BRPM

## 2023-07-28 DIAGNOSIS — D50.9 IRON DEFICIENCY ANEMIA, UNSPECIFIED IRON DEFICIENCY ANEMIA TYPE: ICD-10-CM

## 2023-07-28 PROCEDURE — 25010000002 PHENYLEPHRINE 10 MG/ML SOLUTION: Performed by: ANESTHESIOLOGY

## 2023-07-28 PROCEDURE — 88305 TISSUE EXAM BY PATHOLOGIST: CPT | Performed by: INTERNAL MEDICINE

## 2023-07-28 PROCEDURE — 45385 COLONOSCOPY W/LESION REMOVAL: CPT | Performed by: INTERNAL MEDICINE

## 2023-07-28 PROCEDURE — 43239 EGD BIOPSY SINGLE/MULTIPLE: CPT | Performed by: INTERNAL MEDICINE

## 2023-07-28 PROCEDURE — 25010000002 PROPOFOL 10 MG/ML EMULSION: Performed by: ANESTHESIOLOGY

## 2023-07-28 RX ORDER — LIDOCAINE HYDROCHLORIDE 20 MG/ML
INJECTION, SOLUTION INFILTRATION; PERINEURAL AS NEEDED
Status: DISCONTINUED | OUTPATIENT
Start: 2023-07-28 | End: 2023-07-28 | Stop reason: SURG

## 2023-07-28 RX ORDER — SODIUM CHLORIDE 0.9 % (FLUSH) 0.9 %
10 SYRINGE (ML) INJECTION EVERY 12 HOURS SCHEDULED
Status: DISCONTINUED | OUTPATIENT
Start: 2023-07-28 | End: 2023-07-28 | Stop reason: HOSPADM

## 2023-07-28 RX ORDER — PHENYLEPHRINE HYDROCHLORIDE 10 MG/ML
INJECTION INTRAVENOUS AS NEEDED
Status: DISCONTINUED | OUTPATIENT
Start: 2023-07-28 | End: 2023-07-28 | Stop reason: SURG

## 2023-07-28 RX ORDER — PROPOFOL 10 MG/ML
VIAL (ML) INTRAVENOUS CONTINUOUS PRN
Status: DISCONTINUED | OUTPATIENT
Start: 2023-07-28 | End: 2023-07-28 | Stop reason: SURG

## 2023-07-28 RX ORDER — SODIUM CHLORIDE 0.9 % (FLUSH) 0.9 %
10 SYRINGE (ML) INJECTION AS NEEDED
Status: DISCONTINUED | OUTPATIENT
Start: 2023-07-28 | End: 2023-07-28 | Stop reason: HOSPADM

## 2023-07-28 RX ORDER — SODIUM CHLORIDE, SODIUM LACTATE, POTASSIUM CHLORIDE, CALCIUM CHLORIDE 600; 310; 30; 20 MG/100ML; MG/100ML; MG/100ML; MG/100ML
30 INJECTION, SOLUTION INTRAVENOUS CONTINUOUS PRN
Status: DISCONTINUED | OUTPATIENT
Start: 2023-07-28 | End: 2023-07-28 | Stop reason: HOSPADM

## 2023-07-28 RX ORDER — PROPOFOL 10 MG/ML
VIAL (ML) INTRAVENOUS AS NEEDED
Status: DISCONTINUED | OUTPATIENT
Start: 2023-07-28 | End: 2023-07-28 | Stop reason: SURG

## 2023-07-28 RX ORDER — GLYCOPYRROLATE 0.2 MG/ML
INJECTION INTRAMUSCULAR; INTRAVENOUS AS NEEDED
Status: DISCONTINUED | OUTPATIENT
Start: 2023-07-28 | End: 2023-07-28 | Stop reason: SURG

## 2023-07-28 RX ORDER — SODIUM CHLORIDE, SODIUM LACTATE, POTASSIUM CHLORIDE, CALCIUM CHLORIDE 600; 310; 30; 20 MG/100ML; MG/100ML; MG/100ML; MG/100ML
30 INJECTION, SOLUTION INTRAVENOUS CONTINUOUS
Status: DISCONTINUED | OUTPATIENT
Start: 2023-07-28 | End: 2023-07-28 | Stop reason: HOSPADM

## 2023-07-28 RX ADMIN — PROPOFOL 140 MG: 10 INJECTION, EMULSION INTRAVENOUS at 07:29

## 2023-07-28 RX ADMIN — LIDOCAINE HYDROCHLORIDE 60 MG: 20 INJECTION, SOLUTION INFILTRATION; PERINEURAL at 07:37

## 2023-07-28 RX ADMIN — Medication 160 MCG/KG/MIN: at 07:29

## 2023-07-28 RX ADMIN — GLYCOPYRROLATE 0.1 MG: 1 INJECTION INTRAMUSCULAR; INTRAVENOUS at 07:34

## 2023-07-28 RX ADMIN — PHENYLEPHRINE HYDROCHLORIDE 100 MCG: 10 INJECTION INTRAVENOUS at 07:57

## 2023-07-28 RX ADMIN — SODIUM CHLORIDE, POTASSIUM CHLORIDE, SODIUM LACTATE AND CALCIUM CHLORIDE 30 ML/HR: 600; 310; 30; 20 INJECTION, SOLUTION INTRAVENOUS at 07:04

## 2023-07-28 NOTE — DISCHARGE INSTRUCTIONS
For the next 24 hours patient needs to be with a responsible adult.    For 24 hours DO NOT drive, operate machinery, appliances, drink alcohol, make important decisions or sign legal documents.    Start with a light or bland diet if you are feeling sick to your stomach otherwise advance to regular diet as tolerated.    Follow recommendations on procedure report if provided by your doctor.    Call Dr Uriarte for problems [phone]    Problems may include but not limited to: large amounts of bleeding, trouble breathing, repeated vomiting, severe unrelieved pain, fever or chills.

## 2023-07-28 NOTE — ANESTHESIA PREPROCEDURE EVALUATION
Anesthesia Evaluation     Patient summary reviewed and Nursing notes reviewed   NPO Solid Status: > 8 hours  NPO Liquid Status: > 2 hours           Airway   Mallampati: II  TM distance: >3 FB  Neck ROM: full  No difficulty expected  Dental - normal exam     Comment: Lower front bridge    Pulmonary - negative pulmonary ROS and normal exam    breath sounds clear to auscultation  (+) a smoker (40 pack years) Former, COPD,  Cardiovascular - negative cardio ROS and normal exam    ECG reviewed  Rhythm: regular  Rate: normal    (+) hypertension, valvular problems/murmurs (PFO (patent foramen ovale))hyperlipidemia,  carotid artery disease carotid bilateral  (-) angina, orthopnea, PND, [place]      Neuro/Psych- negative ROS    ROS Comment: Visual disturbance  GI/Hepatic/Renal/Endo - negative ROS     Musculoskeletal (-) negative ROS    Abdominal    Substance History - negative use     OB/GYN negative ob/gyn ROS         Other - negative ROS                       Anesthesia Plan    ASA 3     MAC       Anesthetic plan, risks, benefits, and alternatives have been provided, discussed and informed consent has been obtained with: patient.      CODE STATUS:

## 2023-07-28 NOTE — ANESTHESIA POSTPROCEDURE EVALUATION
Patient: Rito Arnold    Procedure Summary       Date: 07/28/23 Room / Location: [facility] ENDOSCOPY 10 /  [facility]    Anesthesia Start: 0729 Anesthesia Stop: 0804    Procedures:       ESOPHAGOGASTRODUODENOSCOPY with biopsies (Esophagus)      COLONOSCOPY to cecum and TI with cold snare polypectomies Diagnosis:       Iron deficiency anemia, unspecified iron deficiency anemia type      (Iron deficiency anemia, unspecified iron deficiency anemia type [D50.9])    Surgeons: Bethel Uriarte MD Provider: Weston Summers MD    Anesthesia Type: MAC ASA Status: 3            Anesthesia Type: MAC    Vitals  Vitals Value Taken Time   /71 07/28/23 0809   Temp     Pulse 71 07/28/23 0809   Resp 16 07/28/23 0809   SpO2 99 % 07/28/23 0809           Post Anesthesia Care and Evaluation    Patient location during evaluation: PHASE II  Patient participation: waiting for patient participation  Level of consciousness: sleepy but conscious  Pain management: adequate    Airway patency: patent    Cardiovascular status: acceptable  Respiratory status: acceptable  Hydration status: acceptable

## 2023-07-31 LAB
LAB AP CASE REPORT: NORMAL
LAB AP CLINICAL INFORMATION: NORMAL
PATH REPORT.FINAL DX SPEC: NORMAL
PATH REPORT.GROSS SPEC: NORMAL

## 2023-07-31 RX ORDER — ATORVASTATIN CALCIUM 80 MG/1
80 TABLET, FILM COATED ORAL NIGHTLY
Qty: 90 TABLET | Refills: 1 | Status: SHIPPED | OUTPATIENT
Start: 2023-07-31

## 2023-08-15 ENCOUNTER — TELEPHONE (OUTPATIENT)
Dept: GASTROENTEROLOGY | Facility: CLINIC | Age: 63
End: 2023-08-15
Payer: MEDICARE

## 2023-08-15 RX ORDER — LOSARTAN POTASSIUM 50 MG/1
50 TABLET ORAL DAILY
Qty: 90 TABLET | Refills: 2 | Status: SHIPPED | OUTPATIENT
Start: 2023-08-15

## 2023-08-15 NOTE — TELEPHONE ENCOUNTER
Call to patient and left vm (OK per  verbal release), per 's results and recommendations.   Informed patient if they had questions or concerns to call back at [phone] option 1.        HM and CS recall have been completed for 07/28/2028     ----- Message from Bethel Uriarte MD sent at 8/15/2023 11:43 AM EDT -----  Benign EGD bx  The colon polyp(s) biopsies showed adenomatous change. This is not cancerous but is considered potentially precancerous. Follow-up colonoscopy in 5 years is advised.

## 2023-08-21 ENCOUNTER — OFFICE VISIT (OUTPATIENT)
Dept: INTERNAL MEDICINE | Facility: CLINIC | Age: 63
End: 2023-08-21
Payer: MEDICARE

## 2023-08-21 VITALS
OXYGEN SATURATION: 100 % | WEIGHT: 202 LBS | BODY MASS INDEX: 28.92 KG/M2 | HEART RATE: 105 BPM | HEIGHT: 70 IN | DIASTOLIC BLOOD PRESSURE: 82 MMHG | SYSTOLIC BLOOD PRESSURE: 130 MMHG

## 2023-08-21 DIAGNOSIS — Z23 NEED FOR PNEUMOCOCCAL 20-VALENT CONJUGATE VACCINATION: ICD-10-CM

## 2023-08-21 DIAGNOSIS — D50.9 IRON DEFICIENCY ANEMIA, UNSPECIFIED IRON DEFICIENCY ANEMIA TYPE: ICD-10-CM

## 2023-08-21 DIAGNOSIS — Z00.00 WELCOME TO MEDICARE PREVENTIVE VISIT: Primary | ICD-10-CM

## 2023-08-21 DIAGNOSIS — Z13.6 ENCOUNTER FOR ABDOMINAL AORTIC ANEURYSM (AAA) SCREENING: ICD-10-CM

## 2023-08-21 DIAGNOSIS — Z11.4 ENCOUNTER FOR SCREENING FOR HIV: ICD-10-CM

## 2023-08-21 DIAGNOSIS — F11.90 CHRONIC, CONTINUOUS USE OF OPIOIDS: ICD-10-CM

## 2023-08-21 DIAGNOSIS — Z87.891 PERSONAL HISTORY OF NICOTINE DEPENDENCE: ICD-10-CM

## 2023-08-21 DIAGNOSIS — I65.23 BILATERAL CAROTID ARTERY STENOSIS: ICD-10-CM

## 2023-08-21 DIAGNOSIS — Z86.69 HISTORY OF CENTRAL RETINAL ARTERY OCCLUSION: ICD-10-CM

## 2023-08-21 DIAGNOSIS — E78.00 PURE HYPERCHOLESTEROLEMIA: ICD-10-CM

## 2023-08-21 PROCEDURE — 3075F SYST BP GE 130 - 139MM HG: CPT | Performed by: STUDENT IN AN ORGANIZED HEALTH CARE EDUCATION/TRAINING PROGRAM

## 2023-08-21 PROCEDURE — 90677 PCV20 VACCINE IM: CPT | Performed by: STUDENT IN AN ORGANIZED HEALTH CARE EDUCATION/TRAINING PROGRAM

## 2023-08-21 PROCEDURE — 1170F FXNL STATUS ASSESSED: CPT | Performed by: STUDENT IN AN ORGANIZED HEALTH CARE EDUCATION/TRAINING PROGRAM

## 2023-08-21 PROCEDURE — 3079F DIAST BP 80-89 MM HG: CPT | Performed by: STUDENT IN AN ORGANIZED HEALTH CARE EDUCATION/TRAINING PROGRAM

## 2023-08-21 PROCEDURE — 99214 OFFICE O/P EST MOD 30 MIN: CPT | Performed by: STUDENT IN AN ORGANIZED HEALTH CARE EDUCATION/TRAINING PROGRAM

## 2023-08-21 PROCEDURE — G0009 ADMIN PNEUMOCOCCAL VACCINE: HCPCS | Performed by: STUDENT IN AN ORGANIZED HEALTH CARE EDUCATION/TRAINING PROGRAM

## 2023-08-21 PROCEDURE — G0402 INITIAL PREVENTIVE EXAM: HCPCS | Performed by: STUDENT IN AN ORGANIZED HEALTH CARE EDUCATION/TRAINING PROGRAM

## 2023-08-21 RX ORDER — NALOXONE HYDROCHLORIDE 4 MG/.1ML
1 SPRAY NASAL AS NEEDED
Qty: 2 EACH | Refills: 0 | Status: SHIPPED | OUTPATIENT
Start: 2023-08-21 | End: 2023-08-21

## 2023-08-21 NOTE — PROGRESS NOTES
"  Geraldo Morris D.O.  Internal Medicine  [facility]  [address], [address]  [place] [address]  [phone]    Chief Complaint  Annual checkup    HISTORY    Rito Arnold is a 63 y.o. male who presents to the office today as a  an established patient for their annual preventative exam.      {Hospitalization(s) history:[id number]::\"No hospitalization(s) within the last year.\"}     Current exercise regimen:    Status of chronic medical conditions:    PFO/HLD/Carotid artery stenosis: Taking aspirin 81 mg daily and atorvastatin 80 mg daily. 70% stenosis of the proximal right and left ICA.  Vascular surgery took him for left carotid endarterectomy on 4/28/2023.      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 Had EGD and colonoscopy 2023 wit no active source of bleeding.     Lumbar DDD: Follows with [facility]    Any other concerns regarding their health today:     Health Maintenance Summary            Overdue - Pneumococcal Vaccine 65+ (1 - PCV) Overdue - never done      No completion history exists for this topic.              Overdue - TDAP/TD VACCINES (1 - Tdap) Overdue - never done      No completion history exists for this topic.              Overdue - ZOSTER VACCINE (1 of 2) Overdue - never done      No completion history exists for this topic.              Overdue - LUNG CANCER SCREENING (Yearly) Overdue - never done      No completion history exists for this topic.              Overdue - ANNUAL PHYSICAL (Yearly) Overdue - never done      No completion history exists for this topic.              Overdue - COVID-19 Vaccine (6 - Pfizer series) Overdue since 8/8/2023 09/12/2022  Imm Admin: COVID-19 (PFIZER) BIVALENT 12+YRS    03/30/2022  Imm Admin: Covid-19 (Pfizer) Gray Cap Monovalent    11/17/2021  Imm Admin: COVID-19 (PFIZER) " Purple Cap Monovalent    04/01/2021  Imm Admin: COVID-19 (PFIZER) Purple Cap Monovalent    03/11/2021  Imm Admin: COVID-19 (PFIZER) Purple Cap Monovalent              Overdue - AAA SCREEN (ONE-TIME) (Once) Overdue - never done      No completion history exists for this topic.              INFLUENZA VACCINE (Yearly - October to March) Next due on 10/1/2023      09/12/2022  Imm Admin: Influenza Injectable Mdck Pf Quad    10/04/2021  Imm Admin: Fluzone >6mos              LIPID PANEL (Yearly) Next due on 4/27/2024 04/27/2023  Lipid Panel              COLORECTAL CANCER SCREENING (COLONOSCOPY - Every 10 Years) Next due on 7/28/2033 07/28/2023  COLONOSCOPY              HEPATITIS C SCREENING  Completed      05/03/2023  Hep C Virus Ab component of Hepatitis C antibody                     No Known Allergies     No outpatient medications have been marked as taking for the 8/21/23 encounter (Appointment) with Geraldo Morris DO.       {new/est histories:28860}    Immunization History   Administered Date(s) Administered    COVID-19 (PFIZER) BIVALENT 12+YRS 09/12/2022    COVID-19 (PFIZER) Purple Cap Monovalent 03/11/2021, 04/01/2021, 11/17/2021    Covid-19 (Pfizer) Gray Cap Monovalent 03/30/2022    Fluzone >6mos 10/04/2021    Influenza Injectable Mdck Pf Quad 09/12/2022        OBJECTIVE    Vital Signs:   There were no vitals taken for this visit.    Physical Exam     {The following data was reviewed by (Optional):95601}  {Ambulatory Labs (Optional):16230}  {Data reviewed (Optional):88936:::1}     The 10-year ASCVD risk score (Jeremie SARMIENTO, et al., 2019) is: 12.3%    Values used to calculate the score:      Age: 63 years      Sex: Male      Is Non- : No      Diabetic: No      Tobacco smoker: No      Systolic Blood Pressure: 115 mmHg      Is BP treated: Yes      HDL Cholesterol: 50 mg/dL      Total Cholesterol: 199 mg/dL           ASSESSMENT & PLAN     Annual Preventative Health Examination  -Age and sex  "appropriate physical exam performed and documented. Updated past medical, family, social and surgical histories as well as allergies and care team list. Addressed care gaps listed in the medical record.  -Encouraged annual dental and vision exams as part of their overall health.  -Encouraged minimum of 30 minutes or more of exercise at a brisk walk or higher 5 days per week combined with a well-balanced diet.   -Immunizations reviewed and updated in EMR. {[facility] IMMUNIZATIONS:8151448348} recommended.  -Lipid screening:   Lipid Panel          4/27/2023    09:21   Lipid Panel   Total Cholesterol 199    Triglycerides 90    HDL Cholesterol 50    VLDL Cholesterol 16    LDL Cholesterol  133    LDL/HDL Ratio 2.62     {kdmlipidcpe:01036}   -Aspirin for primary or secondary prevention: {kdmaspirincpe:19253}  -Depression and Anxiety screening: {KDMDepressionCPE:07910}  -Diabetes screening: Patient is 35-70 years of age and overweight, screening for diabetes is indicated every 3 years. Screening is up to date.    -Tobacco use screening: Patient {REPORTS/DENIES EC:71592} cigarette use. Tobacco counseling was {ACTIONS; WAS GIVEN/WAS NOT INDICATED:23530}.  -Alcohol use screening: {kdmalcoholcpe:18840}. Alcohol abuse counseling was {ACTIONS; WAS GIVEN/WAS NOT INDICATED:24957}.  -Illicit drug screening: Patient {DOES/DOES NOT:72844} use illicit drugs.   -Abdominal aortic aneurysm screening: {kdmAAAscreenCPE:64366}  -Hypertension screening: Patient with known diagnosis of hypertension and is receiving treatment.  -HIV screening: screen today  -Hepatitis C virus screening:  Patient has already completed Hepatitis C screening. Negative screening on file.   -Syphilis screening: {kdmsyphilisscreenCPE:65789::\"Syphilis screening not indicated.\"}  -Hepatitis B virus screening: {kdmhepBcpe:78410}  -Colon cancer screening: Patient is already up to date on their colon cancer screening with colonoscopy is indicated again in 7/2028  -Lung " "cancer screening: {kdmlungcancerscreeningcpe:10237::\"Patient has never smoked.\"}  -Prostate cancer screening: screening up to date and negative  Lab Results   Component Value Date    PSA 0.267 05/03/2023         Follow up in 1 year for annual physical exam.    Patient/family had no further questions at this time and verbalized understanding of the plan discussed today.   "

## 2023-08-21 NOTE — PATIENT INSTRUCTIONS
Medicare Wellness  Personal Prevention Plan of Service     Date of Office Visit:    Encounter Provider:  Geraldo Morris DO  Place of Service:  [facility]  Patient Name: Rito Arnold  :  1960    As part of the Medicare Wellness portion of your visit today, we are providing you with this personalized preventive plan of services (PPPS). This plan is based upon recommendations of the [place] Preventive Services Task Force (USPSTF) and the Advisory Committee on Immunization Practices (ACIP).    This lists the preventive care services that should be considered, and provides dates of when you are due. Items listed as completed are up-to-date and do not require any further intervention.    Health Maintenance   Topic Date Due    Pneumococcal Vaccine 65+ (1 - PCV) Never done    TDAP/TD VACCINES (1 - Tdap) Never done    ZOSTER VACCINE (1 of 2) Never done    LUNG CANCER SCREENING  Never done    COVID-19 Vaccine (6 - Pfizer series) 2023    AAA SCREEN (ONE-TIME)  Never done    INFLUENZA VACCINE  10/01/2023    LIPID PANEL  2024    ANNUAL PHYSICAL  2024    COLORECTAL CANCER SCREENING  2028    HEPATITIS C SCREENING  Completed       No orders of the defined types were placed in this encounter.      No follow-ups on file.

## 2023-08-21 NOTE — PROGRESS NOTES
The ABCs of the Annual Wellness Visit  [place] to Medicare Visit    Subjective     Rito Arnold is a 63 y.o. male who presents for a  Welcome to Medicare Visit.    The following portions of the patient's history were reviewed and   updated as appropriate: allergies, current medications, past family history, past medical history, past social history, past surgical history, and problem list.     PFO/HLD/Carotid artery stenosis: Taking aspirin 81 mg daily and atorvastatin 80 mg daily. 70% stenosis of the proximal right and left ICA.  Vascular surgery took him for left carotid endarterectomy on 4/28/2023.  Follows with vascular surgery and neurology.     Has quit smoking cold turkey.     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      Iron deficiency anemia: takes ferrous sulfate every third day. Had EGD and colonoscopy 2023 wit no active source of bleeding.     Lumbar DDD: Follows with [facility]. Prescribed Norco  four to five tablets daily.     Compared to one year ago, the patient feels his physical   health is the same.    Compared to one year ago, the patient feels his mental   health is the same.    Recent Hospitalizations:  This patient has had a [facility] admission record on file within the last 365 days.    Current Medical Providers:  Patient Care Team:  Geraldo Morris DO as PCP - General (Internal Medicine)    Outpatient Medications Prior to Visit   Medication Sig Dispense Refill    amLODIPine (NORVASC) 5 MG tablet Take 1 tablet by mouth Daily. 30 tablet 11    aspirin 81 MG chewable tablet Chew 1 tablet Daily. 90 tablet 0    atorvastatin (LIPITOR) 80 MG tablet Take 1 tablet by mouth Every Night. 90 tablet 1    ferrous sulfate 325 (65 FE) MG tablet Take 1 tablet by mouth Every Other Day. 45 tablet 1    HYDROcodone-acetaminophen (NORCO)  MG per tablet hydrocodone  "10 mg-acetaminophen 325 mg tablet      losartan (Cozaar) 50 MG tablet Take 1 tablet by mouth Daily. 90 tablet 2     No facility-administered medications prior to visit.       Opioid medication/s are on active medication list.  and I have evaluated his active treatment plan and pain score trends (see table).  There were no vitals filed for this visit.  I have reviewed the chart for potential of high risk medication and harmful drug interactions in the elderly.          Aspirin is on active medication list. Aspirin use is indicated based on review of current medical condition/s. Pros and cons of this therapy have been discussed today. Benefits of this medication outweigh potential harm.  Patient has been encouraged to continue taking this medication.  .      Patient Active Problem List   Diagnosis    Chronic back pain    Bilateral carotid artery stenosis    Retinal artery occlusion    Tobacco dependence    PFO (patent foramen ovale)    Hyperlipidemia    HTN (hypertension)    Iron deficiency anemia     Advance Care Planning   Advance Care Planning     Advance Directive is not on file.  ACP discussion was held with the patient during this visit. Patient does not have an advance directive, information provided.       Objective   Vitals:    08/21/23 0909   BP: 130/82   Pulse: 105   SpO2: 100%   Weight: 91.6 kg (202 lb)   Height: 177.8 cm (70\")     Estimated body mass index is 28.98 kg/mý as calculated from the following:    Height as of this encounter: 177.8 cm (70\").    Weight as of this encounter: 91.6 kg (202 lb).    BMI is >= 25 and <30. (Overweight) The following options were offered after discussion;: exercise counseling/recommendations      Does the patient have evidence of cognitive impairment?   No         Procedures       HEALTH RISK ASSESSMENT    Smoking Status:  Social History     Tobacco Use   Smoking Status Former    Packs/day: 1.00    Years: 40.00    Pack years: 40.00    Types: Cigarettes    Quit date: 2023 "    Years since quittin.6    Passive exposure: Current   Smokeless Tobacco Never     Alcohol Consumption:  Social History     Substance and Sexual Activity   Alcohol Use Not Currently    Alcohol/week: 4.0 - 5.0 standard drinks    Types: 4 - 5 Standard drinks or equivalent per week       Fall Risk Screen:    YESICA Fall Risk Assessment was completed, and patient is at LOW risk for falls.Assessment completed on:2023    Depression Screen:       2023     9:38 AM   PHQ-2/PHQ-9 Depression Screening   Little Interest or Pleasure in Doing Things 0-->not at all   Feeling Down, Depressed or Hopeless 0-->not at all   PHQ-9: Brief Depression Severity Measure Score 0       Health Habits and Functional and Cognitive Screenin/21/2023     9:37 AM   Functional & Cognitive Status   Do you have difficulty preparing food and eating? No   Do you have difficulty bathing yourself, getting dressed or grooming yourself? No   Do you have difficulty using the toilet? No   Do you have difficulty moving around from place to place? No   Do you have trouble with steps or getting out of a bed or a chair? No   Current Diet Limited Junk Food   Dental Exam Not up to date   Eye Exam Not up to date   Exercise (times per week) 7 times per week   Current Exercises Include Walking;Bicycling Outdoors;Swimming   Do you need help using the phone?  No   Are you deaf or do you have serious difficulty hearing?  No   Do you need help to go to places out of walking distance? No   Do you need help shopping? No   Do you need help preparing meals?  No   Do you need help with housework?  No   Do you need help with laundry? No   Do you need help taking your medications? No   Do you need help managing money? No   Do you ever drive or ride in a car without wearing a seat belt? No   Have you felt unusual stress, anger or loneliness in the last month? No   Who do you live with? Spouse   If you need help, do you have trouble finding someone available  to you? No   Do you have difficulty concentrating, remembering or making decisions? No       Visual Acuity:    Vision Screening    Right eye Left eye Both eyes   Without correction      With correction 20/20 20/30 20/20       Age-appropriate Screening Schedule:  Refer to the list below for future screening recommendations based on patient's age, sex and/or medical conditions. Orders for these recommended tests are listed in the plan section. The patient has been provided with a written plan.    Health Maintenance   Topic Date Due    TDAP/TD VACCINES (1 - Tdap) Never done    ZOSTER VACCINE (1 of 2) Never done    LUNG CANCER SCREENING  Never done    COVID-19 Vaccine (6 - Pfizer series) 08/08/2023    AAA SCREEN (ONE-TIME)  Never done    INFLUENZA VACCINE  10/01/2023    LIPID PANEL  04/27/2024    ANNUAL WELLNESS VISIT  08/21/2024    COLORECTAL CANCER SCREENING  07/28/2028    HEPATITIS C SCREENING  Completed    Pneumococcal Vaccine 65+  Completed        CMS Preventative Services Quick Reference  Risk Factors Identified During Encounter    Chronic Pain:  follows with pain management  Immunizations Discussed/Encouraged: Td, Prevnar 20 (Pneumococcal 20-valent conjugate), Shingrix, and COVID19  Inactivity/Sedentary: Patient was advised to exercise at least 150 minutes a week per CDC recommendations.  Dental Screening Recommended  Vision Screening Recommended  Lab Results   Component Value Date    PSA 0.267 05/03/2023   *PSA up to date and normal  *C-scope up to date  *Order U/S AAA screening and CT lung cancer screening today      The above risks/problems have been discussed with the patient.  Pertinent information has been shared with the patient in the After Visit Summary.  Follow up plans and orders are seen below in the Assessment/Plan Section.      Follow Up:   Initial Medicare Visit in one year    An After Visit Summary and PPPS were made available to the patient.    A problem-based visit was also conducted on the same  day, see below for assessment and plan    Diagnoses and all orders for this visit:    1. Iron deficiency anemia, unspecified iron deficiency anemia type (Primary)  -takes ferrous sulfate every third day. Had EGD and colonoscopy 2023 wit no active source of bleeding.   Lab Results   Component Value Date    WBC 6.0 06/19/2023    HGB 12.6 (L) 06/19/2023    HCT 37.3 (L) 06/19/2023    MCV 89 06/19/2023     06/19/2023     -recheck CBC and iron profile for improvement  -if no improvement in iron stores or CBC will ask GI to consider capsule endoscopy   -     CBC w AUTO Differential  -     Iron Profile  -     Ferritin    2. History of central retinal artery occlusion  3. Pure hypercholesterolemia  4. Bilateral carotid artery stenosis  Lab Results   Component Value Date    CHOL 199 04/27/2023    TRIG 90 04/27/2023    HDL 50 04/27/2023     (H) 04/27/2023     -recheck fasting lipid profile and CMP after starting atorvastatin 80 mg daily  -goal LDL less than 70     5. Chronic, continuous use of opioids  -educated on symptoms over overdose  -will Rx Narcan as he has not been prescribed this by his pain management provider             The following social determinates of health impact the patient's medical decision making: No social determinates of health were factored in to today's visit.     Follow Up  Return in about 6 months (around 2/21/2024) for Recheck.

## 2023-08-22 LAB
ALBUMIN SERPL-MCNC: 4.8 G/DL (ref 3.5–5.2)
ALBUMIN/GLOB SERPL: 2.2 G/DL
ALP SERPL-CCNC: 57 U/L (ref 39–117)
ALT SERPL-CCNC: 20 U/L (ref 1–41)
AST SERPL-CCNC: 23 U/L (ref 1–40)
BASOPHILS # BLD AUTO: 0.05 10*3/MM3 (ref 0–0.2)
BASOPHILS NFR BLD AUTO: 0.7 % (ref 0–1.5)
BILIRUB SERPL-MCNC: 0.6 MG/DL (ref 0–1.2)
BUN SERPL-MCNC: 18 MG/DL (ref 8–23)
BUN/CREAT SERPL: 18 (ref 7–25)
CALCIUM SERPL-MCNC: 10 MG/DL (ref 8.6–10.5)
CHLORIDE SERPL-SCNC: 102 MMOL/L (ref 98–107)
CHOLEST SERPL-MCNC: 137 MG/DL (ref 0–200)
CO2 SERPL-SCNC: 26 MMOL/L (ref 22–29)
CREAT SERPL-MCNC: 1 MG/DL (ref 0.76–1.27)
EGFRCR SERPLBLD CKD-EPI 2021: 83.5 ML/MIN/1.73
EOSINOPHIL # BLD AUTO: 0.22 10*3/MM3 (ref 0–0.4)
EOSINOPHIL NFR BLD AUTO: 3.1 % (ref 0.3–6.2)
ERYTHROCYTE [DISTWIDTH] IN BLOOD BY AUTOMATED COUNT: 15.7 % (ref 12.3–15.4)
FERRITIN SERPL-MCNC: 34.3 NG/ML (ref 30–400)
GLOBULIN SER CALC-MCNC: 2.2 GM/DL
GLUCOSE SERPL-MCNC: 101 MG/DL (ref 65–99)
HCT VFR BLD AUTO: 41.6 % (ref 37.5–51)
HDLC SERPL-MCNC: 60 MG/DL (ref 40–60)
HGB BLD-MCNC: 14 G/DL (ref 13–17.7)
HIV 1+2 AB+HIV1 P24 AG SERPL QL IA: NON REACTIVE
IMM GRANULOCYTES # BLD AUTO: 0.02 10*3/MM3 (ref 0–0.05)
IMM GRANULOCYTES NFR BLD AUTO: 0.3 % (ref 0–0.5)
IRON SATN MFR SERPL: 24 % (ref 20–50)
IRON SERPL-MCNC: 109 MCG/DL (ref 59–158)
LDLC SERPL CALC-MCNC: 55 MG/DL (ref 0–100)
LDLC/HDLC SERPL: 0.86 {RATIO}
LYMPHOCYTES # BLD AUTO: 1.89 10*3/MM3 (ref 0.7–3.1)
LYMPHOCYTES NFR BLD AUTO: 27 % (ref 19.6–45.3)
MCH RBC QN AUTO: 31.6 PG (ref 26.6–33)
MCHC RBC AUTO-ENTMCNC: 33.7 G/DL (ref 31.5–35.7)
MCV RBC AUTO: 93.9 FL (ref 79–97)
MONOCYTES # BLD AUTO: 0.55 10*3/MM3 (ref 0.1–0.9)
MONOCYTES NFR BLD AUTO: 7.9 % (ref 5–12)
NEUTROPHILS # BLD AUTO: 4.27 10*3/MM3 (ref 1.7–7)
NEUTROPHILS NFR BLD AUTO: 61 % (ref 42.7–76)
NRBC BLD AUTO-RTO: 0 /100 WBC (ref 0–0.2)
PLATELET # BLD AUTO: 232 10*3/MM3 (ref 140–450)
POTASSIUM SERPL-SCNC: 4.4 MMOL/L (ref 3.5–5.2)
PROT SERPL-MCNC: 7 G/DL (ref 6–8.5)
RBC # BLD AUTO: 4.43 10*6/MM3 (ref 4.14–5.8)
SODIUM SERPL-SCNC: 139 MMOL/L (ref 136–145)
TIBC SERPL-MCNC: 454 MCG/DL
TRIGL SERPL-MCNC: 127 MG/DL (ref 0–150)
UIBC SERPL-MCNC: 345 MCG/DL (ref 112–346)
VLDLC SERPL CALC-MCNC: 22 MG/DL (ref 5–40)
WBC # BLD AUTO: 7 10*3/MM3 (ref 3.4–10.8)

## 2023-11-17 ENCOUNTER — HOSPITAL ENCOUNTER (OUTPATIENT)
Dept: ULTRASOUND IMAGING | Facility: HOSPITAL | Age: 63
Discharge: HOME | End: 2023-11-17
Payer: MEDICARE

## 2023-11-17 ENCOUNTER — HOSPITAL ENCOUNTER (OUTPATIENT)
Dept: CT IMAGING | Facility: HOSPITAL | Age: 63
Discharge: HOME | End: 2023-11-17
Admitting: STUDENT IN AN ORGANIZED HEALTH CARE EDUCATION/TRAINING PROGRAM
Payer: MEDICARE

## 2023-11-17 DIAGNOSIS — Z13.6 ENCOUNTER FOR ABDOMINAL AORTIC ANEURYSM (AAA) SCREENING: ICD-10-CM

## 2023-11-17 PROCEDURE — 71271 CT THORAX LUNG CANCER SCR C-: CPT

## 2023-11-17 PROCEDURE — 76706 US ABDL AORTA SCREEN AAA: CPT

## 2023-11-18 DIAGNOSIS — I71.40 ABDOMINAL AORTIC ANEURYSM (AAA) WITHOUT RUPTURE, UNSPECIFIED PART: Primary | ICD-10-CM

## 2023-11-23 DIAGNOSIS — J43.9 PULMONARY EMPHYSEMA, UNSPECIFIED EMPHYSEMA TYPE: Primary | ICD-10-CM

## 2023-11-23 DIAGNOSIS — Z87.891 PERSONAL HISTORY OF NICOTINE DEPENDENCE: ICD-10-CM

## 2023-12-05 ENCOUNTER — OFFICE VISIT (OUTPATIENT)
Dept: CARDIOLOGY | Facility: CLINIC | Age: 63
End: 2023-12-05
Payer: MEDICARE

## 2023-12-05 VITALS
OXYGEN SATURATION: 98 % | BODY MASS INDEX: 31.3 KG/M2 | WEIGHT: 218.6 LBS | SYSTOLIC BLOOD PRESSURE: 140 MMHG | HEIGHT: 70 IN | DIASTOLIC BLOOD PRESSURE: 80 MMHG | HEART RATE: 96 BPM

## 2023-12-05 DIAGNOSIS — I65.23 BILATERAL CAROTID ARTERY STENOSIS: ICD-10-CM

## 2023-12-05 DIAGNOSIS — Z87.891 HISTORY OF TOBACCO ABUSE: ICD-10-CM

## 2023-12-05 DIAGNOSIS — Q21.12 PATENT FORAMEN OVALE: ICD-10-CM

## 2023-12-05 DIAGNOSIS — I10 ESSENTIAL HYPERTENSION: Primary | ICD-10-CM

## 2023-12-05 PROCEDURE — 1160F RVW MEDS BY RX/DR IN RCRD: CPT | Performed by: INTERNAL MEDICINE

## 2023-12-05 PROCEDURE — 1159F MED LIST DOCD IN RCRD: CPT | Performed by: INTERNAL MEDICINE

## 2023-12-05 PROCEDURE — 3079F DIAST BP 80-89 MM HG: CPT | Performed by: INTERNAL MEDICINE

## 2023-12-05 PROCEDURE — 3077F SYST BP >= 140 MM HG: CPT | Performed by: INTERNAL MEDICINE

## 2023-12-05 PROCEDURE — 99214 OFFICE O/P EST MOD 30 MIN: CPT | Performed by: INTERNAL MEDICINE

## 2023-12-05 NOTE — PROGRESS NOTES
[facility]      Patient Name: Rito Arnold  :1960  Age: 63 y.o.  Encounter Provider:  Vu Roper Jr, MD      Chief Complaint: Hospital follow-up for retinal artery occlusion and peripheral arterial disease/carotid stenosis      HPI  Rito Arnold is a 63 y.o. male past medical history of tobacco abuse who presented in May 2023 after evaluation by his ophthalmologist for visual field deficit.      Last clinic visit note: He never lost vision in that eye.  His ophthalmologist was concerned for an occlusion of a retinal branch and he was sent to [facility] emergency room for work-up.  MRI was negative for acute infarct.  CTA head and neck showed 70% stenosis of the left and right carotid arteries.  Patient was taken for CEA of left carotid which was an uncomplicated surgery.  An echocardiogram was performed showing a small PFO, normal ejection fraction and no significant valvular heart disease.  Patient was referred to cardiology clinic for evaluation of PFO.  He is a fairly active gentleman with no chest pain or shortness of air.  No orthopnea or PND.  He notes some mild lower extremity edema at the end of the day which resolves after sleep.  No palpitations, dizziness or syncope.  EKG today in clinic shows sinus rhythm.  Family history positive for father having coronary artery disease requiring surgical bypass in his 50s.  He smoked until the day of admission and has been abstinent since.  Social alcohol use and denies illicit drug use.    On home blood pressure monitoring we found that his blood pressure was very elevated.  He was started on amlodipine with losartan added shortly thereafter.  His blood pressure has been well-controlled over the past few months averaging 130/80 mmHg.  He has been monitoring sodium in the diet and increasing activity as tolerated with no chest pain or shortness of air.  No orthopnea, PND or edema.  No palpitations, dizziness or syncope.  He remains  "abstinent from nicotine products having quit smoking in April 2023.  No cardiac complaints at time of interview.    The following portions of the patient's history were reviewed and updated as appropriate: allergies, current medications, past family history, past medical history, past social history, past surgical history and problem list.      Review of Systems   Constitutional: Negative for chills and fever.   HENT:  Negative for hoarse voice and sore throat.    Eyes:  Negative for double vision and photophobia.   Cardiovascular:  Negative for chest pain, leg swelling, near-syncope, orthopnea, palpitations, paroxysmal nocturnal dyspnea and syncope.   Respiratory:  Negative for cough and wheezing.    Skin:  Negative for poor wound healing and rash.   Musculoskeletal:  Negative for arthritis and joint swelling.   Gastrointestinal:  Negative for bloating, abdominal pain, hematemesis and hematochezia.   Neurological:  Negative for dizziness and focal weakness.   Psychiatric/Behavioral:  Negative for depression and suicidal ideas.        OBJECTIVE:   Vital Signs  Vitals:    12/05/23 0912   BP: 140/80   Pulse: 96   SpO2: 98%     Estimated body mass index is 31.37 kg/m² as calculated from the following:    Height as of this encounter: 177.8 cm (70\").    Weight as of this encounter: 99.2 kg (218 lb 9.6 oz).    Vitals reviewed.   Constitutional:       Appearance: Healthy appearance. Not in distress.   Neck:      Vascular: No JVR. JVD normal.   Pulmonary:      Effort: Pulmonary effort is normal.      Breath sounds: Normal breath sounds. No wheezing. No rhonchi. No rales.   Chest:      Chest wall: Not tender to palpatation.   Cardiovascular:      PMI at left midclavicular line. Normal rate. Regular rhythm. Normal S1. Normal S2.       Murmurs: There is no murmur.      No gallop.  No click. No rub.   Pulses:     Intact distal pulses.   Edema:     Peripheral edema absent.   Abdominal:      General: Bowel sounds are normal.      " Palpations: Abdomen is soft.      Tenderness: There is no abdominal tenderness.   Musculoskeletal: Normal range of motion.         General: No tenderness. Skin:     General: Skin is warm and dry.   Neurological:      General: No focal deficit present.      Mental Status: Alert and oriented to person, place and time.       Procedures      Lipid Panel          4/27/2023    09:21 8/21/2023    10:11   Lipid Panel   Total Cholesterol 199     Total Cholesterol  137    Triglycerides 90  127    HDL Cholesterol 50  60    VLDL Cholesterol 16  22    LDL Cholesterol  133  55    LDL/HDL Ratio 2.62  0.86         BUN   Date Value Ref Range Status   08/21/2023 18 8 - 23 mg/dL Final   04/29/2023 13 8 - 23 mg/dL Final     Creatinine   Date Value Ref Range Status   08/21/2023 1.00 0.76 - 1.27 mg/dL Final   04/29/2023 0.68 (L) 0.76 - 1.27 mg/dL Final     Potassium   Date Value Ref Range Status   08/21/2023 4.4 3.5 - 5.2 mmol/L Final   04/29/2023 4.2 3.5 - 5.2 mmol/L Final     ALT (SGPT)   Date Value Ref Range Status   08/21/2023 20 1 - 41 U/L Final   04/29/2023 11 1 - 41 U/L Final     AST (SGOT)   Date Value Ref Range Status   08/21/2023 23 1 - 40 U/L Final   04/29/2023 11 1 - 40 U/L Final           ASSESSMENT:     62-year-old male with bilateral carotid stenosis, concern for occlusion of retinal artery branch, remote history of tobacco abuse presents for evaluation of patent foramen ovale    PLAN OF CARE:     PFO -no infarct on MRI.  Severe carotid atherosclerosis noted status post CEA.  No indication for closure at this time.  Carotid stenosis -status post left CEA with no complications.  Following with vascular surgery.  Continue aspirin and statin.  Family history coronary artery disease -we had a long discussion about coronary calcium scoring as risk stratification given his father's history.  He will do some research and let me know if he would like to move forward with screening.  Essential hypertension -much better controlled  on current regimen.  Continue sodium restricted diet.  Periodic surveillance with twice daily blood pressure log.  He feels that he used to snore but there is no one there to tell him otherwise now.  We discussed the possibility of testing for sleep apnea and he will consider his options and let me know in the future.    Return to clinic 12 months             Discharge Medications            Accurate as of December 5, 2023  9:15 AM. If you have any questions, ask your nurse or doctor.                Continue These Medications        Instructions Start Date   amLODIPine 5 MG tablet  Commonly known as: NORVASC   5 mg, Oral, Daily      aspirin 81 MG chewable tablet   81 mg, Oral, Daily      atorvastatin 80 MG tablet  Commonly known as: LIPITOR   80 mg, Oral, Nightly      HYDROcodone-acetaminophen  MG per tablet  Commonly known as: NORCO   hydrocodone 10 mg-acetaminophen 325 mg tablet      losartan 50 MG tablet  Commonly known as: Cozaar   50 mg, Oral, Daily               Thank you for allowing me to participate in the care of your patient,      Sincerely,   Vu Roper MD  [facility]  12/05/23  09:15 EST

## 2024-01-19 ENCOUNTER — TELEPHONE (OUTPATIENT)
Dept: INTERNAL MEDICINE | Facility: CLINIC | Age: 64
End: 2024-01-19

## 2024-01-19 ENCOUNTER — TELEMEDICINE (OUTPATIENT)
Dept: INTERNAL MEDICINE | Facility: CLINIC | Age: 64
End: 2024-01-19
Payer: MEDICARE

## 2024-01-19 DIAGNOSIS — U07.1 COVID-19 VIRUS INFECTION: Primary | ICD-10-CM

## 2024-01-19 DIAGNOSIS — I71.40 ABDOMINAL AORTIC ANEURYSM (AAA) WITHOUT RUPTURE, UNSPECIFIED PART: ICD-10-CM

## 2024-01-19 DIAGNOSIS — R54 ADVANCED AGE: ICD-10-CM

## 2024-01-19 DIAGNOSIS — I10 ESSENTIAL HYPERTENSION: ICD-10-CM

## 2024-01-19 NOTE — PROGRESS NOTES
"  Geraldo Morris D.O.  Internal Medicine  [facility]  [address], [address]  [place] [address]  [phone]      Chief Complaint  Illness (COVID 19)    SUBJECTIVE    Illness        Rito Arnold is a 63 y.o. male who presents to the office today via TELEHEALTH as an established patient that last saw me on 8/21/2023.   Mode of Visit: Video  Location of patient: home  Location of provider: [facility]  You have chosen to receive care through a telehealth visit.  Does the patient consent to use a video/audio connection their medical care today? yes  The visit included audio and video interaction. No technical issues occurred during this visit.     Pt states he started feeling bad yesterday with fatigue, headache, head congestion. No fever or chills. \"Just constant can't be comfortable.\"   He took a home COVID 19 test yesterday and it was positive.   No trouble breathing or shortness of air.       No Known Allergies     Outpatient Medications Marked as Taking for the 1/19/24 encounter (Telemedicine) with Geraldo Morris, DO   Medication Sig Dispense Refill    amLODIPine (NORVASC) 5 MG tablet Take 1 tablet by mouth Daily. 30 tablet 11    aspirin 81 MG chewable tablet Chew 1 tablet Daily. 90 tablet 0    atorvastatin (LIPITOR) 80 MG tablet Take 1 tablet by mouth Every Night. 90 tablet 1    HYDROcodone-acetaminophen (NORCO)  MG per tablet hydrocodone 10 mg-acetaminophen 325 mg tablet      losartan (Cozaar) 50 MG tablet Take 1 tablet by mouth Daily. 90 tablet 2        Past Medical History:   Diagnosis Date    AAA (abdominal aortic aneurysm)     Carotid artery stenosis     Chronic back pain     Emphysema of lung     seen on CT    HTN (hypertension)     Hyperlipidemia     PFO (patent foramen ovale)     Retinal artery occlusion 2023    left    Tobacco dependence        OBJECTIVE    Vital Signs:   There were no vitals taken for this visit.    Physical Exam  Vitals reviewed: telehealth no vital " signs taken.   Constitutional:       General: He is not in acute distress.     Appearance: He is not ill-appearing.   Pulmonary:      Effort: Pulmonary effort is normal. No respiratory distress.      Comments: Speaking full sentences without resp difficulty   Skin:     Coloration: Skin is not jaundiced.   Neurological:      Mental Status: He is alert.   Psychiatric:         Mood and Affect: Mood normal.         Behavior: Behavior normal.         Thought Content: Thought content normal.                             ASSESSMENT & PLAN     Diagnoses and all orders for this visit:    1. COVID-19 virus infection (Primary)  2. Advanced age  3. Essential hypertension  4. Abdominal aortic aneurysm (AAA) without rupture, unspecified part        -Pt seen today for telehealth visit for symptoms consistent with COVID 19 infection with a positive   at home or lab-based COVID 19 test in the last 5 days. Patient symptoms are appropriate for continued outpatient care.   -advised pt that COVID 19 in general is treated like other viral URI: cough/cold medications over the counter, tylenol and ibuprofen, ample fluid intake. Use coricidin HBP given high blood pressure diagnosis.   -advised pt that there is an approved medication (PAXLOVID) approved for COVID 19 in high risk patients to prevent the risk of progression to severe illness requiring hospitalization.   -patient's risk factors for severe COVID 19 include: age, HTN, history of vascular disease   -Discussed with pt that the long term side of Paxlovid are not known. Short term side effects that have been seen include muscle aches, diarrhea, bad taste in the mouth.   -Pt was agreeable to initiate treatment with Paxlovid after hearing risk and benefits.   -I performed a drug interaction check and made the following recommendations  *STOP atorvastatin during treatment  *take 1/2 dosage of amlodipine daily and for 3 days after treatment   *take 1/2 dosage of Norco and monitor for  symptoms of oversedation. If any oversedation occurs, further decrease dosage or report to the ER.   -renal function is appropriate for full dosed PAXLOVID  -informed pt to report to ER for worsening symptoms, feeling of inability to catch breath, chest pain, worsening fever or chills          The following social determinates of health impact the patient's medical decision making: No social determinates of health were factored in to today's visit.     Follow Up  No follow-ups on file.    Patient/family had no further questions at this time and verbalized understanding of the plan discussed today.

## 2024-01-19 NOTE — TELEPHONE ENCOUNTER
Caller: Rito Arnold    Relationship to patient: Self    Best call back number: [phone]     Date of positive COVID19 test: 1/18/2024      COVID19 symptoms:     FATIGUE, COUGHING, CAN'T GET COMFORTABLE, NO APPETITE, STUFFY      Additional information or concerns:     PATIENT IS WANTING TO KNOW IF YOU CAN CALL HIM IN SOMETHING FOR COVID      What is the patients preferred pharmacy:     [facility] [id number] - [facility] [address] AT [facility] [phone] [facility] [phone] FX       PLEASE CALL AND ADVISE

## 2024-01-29 RX ORDER — ATORVASTATIN CALCIUM 80 MG/1
80 TABLET, FILM COATED ORAL NIGHTLY
Qty: 90 TABLET | Refills: 1 | Status: SHIPPED | OUTPATIENT
Start: 2024-01-29

## 2024-05-23 RX ORDER — LOSARTAN POTASSIUM 50 MG/1
50 TABLET ORAL DAILY
Qty: 90 TABLET | Refills: 1 | Status: SHIPPED | OUTPATIENT
Start: 2024-05-23

## 2024-05-28 RX ORDER — AMLODIPINE BESYLATE 5 MG/1
5 TABLET ORAL DAILY
Qty: 90 TABLET | Refills: 1 | Status: SHIPPED | OUTPATIENT
Start: 2024-05-28

## 2024-06-04 ENCOUNTER — OFFICE VISIT (OUTPATIENT)
Age: 64
End: 2024-06-04
Payer: MEDICARE

## 2024-06-04 ENCOUNTER — HOSPITAL ENCOUNTER (OUTPATIENT)
Facility: HOSPITAL | Age: 64
Discharge: HOME | End: 2024-06-04
Admitting: SURGERY
Payer: MEDICARE

## 2024-06-04 VITALS
HEIGHT: 70 IN | DIASTOLIC BLOOD PRESSURE: 80 MMHG | BODY MASS INDEX: 31.5 KG/M2 | WEIGHT: 220 LBS | SYSTOLIC BLOOD PRESSURE: 138 MMHG

## 2024-06-04 DIAGNOSIS — I65.23 BILATERAL CAROTID ARTERY STENOSIS: Primary | Chronic | ICD-10-CM

## 2024-06-04 DIAGNOSIS — I65.23 BILATERAL CAROTID ARTERY STENOSIS: ICD-10-CM

## 2024-06-04 DIAGNOSIS — I65.23 CAROTID STENOSIS, BILATERAL: ICD-10-CM

## 2024-06-04 PROCEDURE — G2211 COMPLEX E/M VISIT ADD ON: HCPCS | Performed by: NURSE PRACTITIONER

## 2024-06-04 PROCEDURE — 3079F DIAST BP 80-89 MM HG: CPT | Performed by: NURSE PRACTITIONER

## 2024-06-04 PROCEDURE — 3075F SYST BP GE 130 - 139MM HG: CPT | Performed by: NURSE PRACTITIONER

## 2024-06-04 PROCEDURE — 93880 EXTRACRANIAL BILAT STUDY: CPT

## 2024-06-04 PROCEDURE — 1160F RVW MEDS BY RX/DR IN RCRD: CPT | Performed by: NURSE PRACTITIONER

## 2024-06-04 PROCEDURE — 1159F MED LIST DOCD IN RCRD: CPT | Performed by: NURSE PRACTITIONER

## 2024-06-04 PROCEDURE — 93880 EXTRACRANIAL BILAT STUDY: CPT | Performed by: SURGERY

## 2024-06-04 PROCEDURE — 99213 OFFICE O/P EST LOW 20 MIN: CPT | Performed by: NURSE PRACTITIONER

## 2024-06-04 NOTE — PROGRESS NOTES
Chief Complaint  Carotid Artery Disease    Subjective        Rito Arnold presents to [facility] VASCULAR SURGERY  HPI   Rito Arnold is a 63 y.o. male that has been followed in our office by Dr. Quiros for carotid artery stenosis.  On 4/28/2023, he had a left carotid endarterectomy.  He returns today in  follow up along with a carotid duplex. He  reports he has been doing well without hospitalizations or surgeries. He denies any symptoms consistent with CVA, TIA, or amaurosis fugax.     Review of Systems   Constitutional:  Negative for fever.   Eyes:  Negative for visual disturbance.   Cardiovascular:  Negative for leg swelling.   Gastrointestinal:  Negative for abdominal pain.   Musculoskeletal:  Negative for back pain.   Skin:  Negative for color change, pallor and wound.   Neurological:  Negative for dizziness, facial asymmetry, speech difficulty and weakness.        Rito Arnold  reports that he quit smoking about 17 months ago. His smoking use included cigarettes. He started smoking about 41 years ago. He has a 40 pack-year smoking history. He has been exposed to tobacco smoke. He has never used smokeless tobacco..        Objective   Vital Signs:  Vitals:    06/04/24 0817   BP: 138/80      Body mass index is 31.57 kg/m².           Physical Exam  Vitals reviewed.   Constitutional:       Appearance: Normal appearance.   HENT:      Head: Normocephalic.   Cardiovascular:      Rate and Rhythm: Normal rate and regular rhythm.      Pulses: Normal pulses.           Dorsalis pedis pulses are 3+ on the right side and 3+ on the left side.        Posterior tibial pulses are 3+ on the right side and 3+ on the left side.   Pulmonary:      Effort: Pulmonary effort is normal.   Skin:     General: Skin is warm.   Neurological:      General: No focal deficit present.      Mental Status: He is alert and oriented to person, place, and time.   Psychiatric:         Mood and Affect: Mood  normal.          Result Review :      Previous carotid duplex: Less than 50% stenosis on the right, less than 50% stenosis on the left, carotid endarterectomy has been performed.    Carotid duplex from today: less than 50% stenosis bilaterally                   Assessment and Plan     Diagnoses and all orders for this visit:    1. Bilateral carotid artery stenosis (Primary)    2. Carotid stenosis, bilateral  -     Duplex Carotid Ultrasound CAR; Future             Patient present today for follow up of carotid artery stenosis. He is to continue his antiplatelet agent which is aspirin. He is on a statin for cholesterol control.  We discussed adequate blood pressure control. He will return in 6 months along with a repeat carotid artery duplex.    Follow Up     Return in about 6 months (around 12/4/2024) for carotid duplex.  Patient was given instructions and counseling regarding his condition or for health maintenance advice. Please see specific information pulled into the AVS if appropriate.     FRANCISCO JAVIER Fan

## 2024-06-05 LAB
BH CV XLRA MEAS LEFT CAROTID BULB EDV: -22 CM/SEC
BH CV XLRA MEAS LEFT CAROTID BULB PSV: -102.9 CM/SEC
BH CV XLRA MEAS LEFT DIST CCA EDV: 30.6 CM/SEC
BH CV XLRA MEAS LEFT DIST CCA PSV: 119.4 CM/SEC
BH CV XLRA MEAS LEFT DIST ICA EDV: -55.7 CM/SEC
BH CV XLRA MEAS LEFT DIST ICA PSV: -120.2 CM/SEC
BH CV XLRA MEAS LEFT ICA/CCA RATIO: 1.01
BH CV XLRA MEAS LEFT MID CCA EDV: 40.1 CM/SEC
BH CV XLRA MEAS LEFT MID CCA PSV: 99 CM/SEC
BH CV XLRA MEAS LEFT MID ICA EDV: -44 CM/SEC
BH CV XLRA MEAS LEFT MID ICA PSV: -92.6 CM/SEC
BH CV XLRA MEAS LEFT PROX CCA EDV: 22 CM/SEC
BH CV XLRA MEAS LEFT PROX CCA PSV: 103.7 CM/SEC
BH CV XLRA MEAS LEFT PROX ECA EDV: -32.2 CM/SEC
BH CV XLRA MEAS LEFT PROX ECA PSV: -153 CM/SEC
BH CV XLRA MEAS LEFT PROX ICA EDV: -31.1 CM/SEC
BH CV XLRA MEAS LEFT PROX ICA PSV: -75.6 CM/SEC
BH CV XLRA MEAS LEFT PROX SCLA PSV: 246.7 CM/SEC
BH CV XLRA MEAS LEFT VERTEBRAL A EDV: -18.8 CM/SEC
BH CV XLRA MEAS LEFT VERTEBRAL A PSV: -62.7 CM/SEC
BH CV XLRA MEAS RIGHT CAROTID BULB EDV: -27.6 CM/SEC
BH CV XLRA MEAS RIGHT CAROTID BULB PSV: -107.3 CM/SEC
BH CV XLRA MEAS RIGHT DIST CCA EDV: 25.2 CM/SEC
BH CV XLRA MEAS RIGHT DIST CCA PSV: 85 CM/SEC
BH CV XLRA MEAS RIGHT DIST ICA EDV: -41.6 CM/SEC
BH CV XLRA MEAS RIGHT DIST ICA PSV: -119.6 CM/SEC
BH CV XLRA MEAS RIGHT ICA/CCA RATIO: 1.71
BH CV XLRA MEAS RIGHT MID CCA EDV: 27 CM/SEC
BH CV XLRA MEAS RIGHT MID CCA PSV: 87.4 CM/SEC
BH CV XLRA MEAS RIGHT MID ICA EDV: -30.5 CM/SEC
BH CV XLRA MEAS RIGHT MID ICA PSV: -89.7 CM/SEC
BH CV XLRA MEAS RIGHT PROX CCA EDV: 26.7 CM/SEC
BH CV XLRA MEAS RIGHT PROX CCA PSV: 102.1 CM/SEC
BH CV XLRA MEAS RIGHT PROX ECA EDV: 60.2 CM/SEC
BH CV XLRA MEAS RIGHT PROX ECA PSV: 374.5 CM/SEC
BH CV XLRA MEAS RIGHT PROX ICA EDV: -33.8 CM/SEC
BH CV XLRA MEAS RIGHT PROX ICA PSV: -145.4 CM/SEC
BH CV XLRA MEAS RIGHT PROX SCLA PSV: 379.4 CM/SEC
BH CV XLRA MEAS RIGHT VERTEBRAL A EDV: 19.3 CM/SEC
BH CV XLRA MEAS RIGHT VERTEBRAL A PSV: 65.7 CM/SEC
LEFT ARM BP: NORMAL MMHG
RIGHT ARM BP: NORMAL MMHG

## 2024-07-29 RX ORDER — ATORVASTATIN CALCIUM 80 MG/1
80 TABLET, FILM COATED ORAL NIGHTLY
Qty: 30 TABLET | Refills: 0 | Status: SHIPPED | OUTPATIENT
Start: 2024-07-29

## 2024-08-25 RX ORDER — ATORVASTATIN CALCIUM 80 MG/1
80 TABLET, FILM COATED ORAL NIGHTLY
Qty: 90 TABLET | Refills: 0 | Status: SHIPPED | OUTPATIENT
Start: 2024-08-25

## 2024-11-20 RX ORDER — LOSARTAN POTASSIUM 50 MG/1
50 TABLET ORAL DAILY
Qty: 90 TABLET | Refills: 1 | Status: SHIPPED | OUTPATIENT
Start: 2024-11-20

## 2024-11-20 RX ORDER — AMLODIPINE BESYLATE 5 MG/1
5 TABLET ORAL DAILY
Qty: 90 TABLET | Refills: 1 | Status: SHIPPED | OUTPATIENT
Start: 2024-11-20

## 2024-11-25 RX ORDER — ATORVASTATIN CALCIUM 80 MG/1
80 TABLET, FILM COATED ORAL NIGHTLY
Qty: 90 TABLET | Refills: 0 | OUTPATIENT
Start: 2024-11-25

## 2024-12-03 ENCOUNTER — OFFICE VISIT (OUTPATIENT)
Age: 64
End: 2024-12-03
Payer: MEDICARE

## 2024-12-03 ENCOUNTER — HOSPITAL ENCOUNTER (OUTPATIENT)
Facility: HOSPITAL | Age: 64
Discharge: HOME | End: 2024-12-03
Admitting: NURSE PRACTITIONER
Payer: MEDICARE

## 2024-12-03 VITALS
DIASTOLIC BLOOD PRESSURE: 90 MMHG | HEIGHT: 70 IN | WEIGHT: 224 LBS | SYSTOLIC BLOOD PRESSURE: 142 MMHG | BODY MASS INDEX: 32.07 KG/M2

## 2024-12-03 DIAGNOSIS — I65.23 CAROTID STENOSIS, BILATERAL: ICD-10-CM

## 2024-12-03 DIAGNOSIS — I65.23 BILATERAL CAROTID ARTERY STENOSIS: Primary | Chronic | ICD-10-CM

## 2024-12-03 DIAGNOSIS — E04.1 THYROID NODULE: ICD-10-CM

## 2024-12-03 PROCEDURE — 93880 EXTRACRANIAL BILAT STUDY: CPT

## 2024-12-03 NOTE — PROGRESS NOTES
Chief Complaint  Carotid Artery Disease    Subjective        Rito Arnold presents to [facility] VASCULAR SURGERY  HPI   Rito Arnold is a 64 y.o. male that has been followed in our office by Dr. Quirso for carotid artery stenosis.  On 4/28/2023, he had a left carotid endarterectomy.  He returns today in  follow up along with a carotid duplex. He  reports he has been doing well without hospitalizations or surgeries. He denies any symptoms consistent with CVA, TIA, or amaurosis fugax.     Review of Systems   Constitutional:  Negative for fever.   Eyes:  Negative for visual disturbance.   Cardiovascular:  Negative for leg swelling.   Gastrointestinal:  Negative for abdominal pain.   Musculoskeletal:  Negative for back pain.   Skin:  Negative for color change, pallor and wound.   Neurological:  Negative for dizziness, facial asymmetry, speech difficulty and weakness.        Rito Arnold  reports that he quit smoking about 23 months ago. His smoking use included cigarettes. He started smoking about 41 years ago. He has a 40 pack-year smoking history. He has been exposed to tobacco smoke. He has never used smokeless tobacco..        Objective   Vital Signs:  Vitals:    12/03/24 0821   BP: 142/90        Body mass index is 32.14 kg/m².           Physical Exam  Vitals reviewed.   Constitutional:       Appearance: Normal appearance.   HENT:      Head: Normocephalic.   Cardiovascular:      Rate and Rhythm: Normal rate and regular rhythm.      Pulses: Normal pulses.           Dorsalis pedis pulses are 3+ on the right side and 3+ on the left side.        Posterior tibial pulses are 3+ on the right side and 3+ on the left side.   Pulmonary:      Effort: Pulmonary effort is normal.   Skin:     General: Skin is warm.   Neurological:      General: No focal deficit present.      Mental Status: He is alert and oriented to person, place, and time.   Psychiatric:         Mood and Affect: Mood  normal.          Result Review :      Previous carotid duplex: Duplex Carotid Ultrasound CAR (06/04/2024 08:16)     Carotid duplex from today: Duplex Carotid Ultrasound CAR (12/03/2024 08:10)     Thyroid nodule noted on the left, measuring 0.51 cm x 0.70 cm in diameter.              Assessment and Plan     Diagnoses and all orders for this visit:    1. Bilateral carotid artery stenosis (Primary)    2. Carotid stenosis, bilateral  -     Duplex Carotid Ultrasound CAR; Future    3. Thyroid nodule  -     US Thyroid; Future               Patient present today for follow up of carotid artery stenosis.  He is status post left carotid endarterectomy in April 2023.  He has no restenosis on that side and his contralateral side is stable with mild disease. There was an incidental finding of a thyroid nodule. I have ordered an ultrasound of his thyroid and I will call him with his results and refer to ENT if appropriate. He is to continue his antiplatelet agent which is aspirin. He is on a statin for cholesterol control.  We discussed adequate blood pressure control. He will return in 12 months along with a repeat carotid artery duplex.    Follow Up     Return in about 1 year (around 12/3/2025) for carotid duplex.  Patient was given instructions and counseling regarding his condition or for health maintenance advice. Please see specific information pulled into the AVS if appropriate.     FRANCISCO JAVIER Fan

## 2024-12-05 RX ORDER — ATORVASTATIN CALCIUM 80 MG/1
80 TABLET, FILM COATED ORAL NIGHTLY
Qty: 90 TABLET | Refills: 0 | OUTPATIENT
Start: 2024-12-05

## 2024-12-08 LAB
BH CV VAS STUDY COMMENTS THYROID NODULE LT 1ST MEASURE: 0.51 CM
BH CV VAS STUDY COMMENTS THYROID NODULE LT 2ND MEASUR: 0.7 CM
BH CV XLRA MEAS LEFT CAROTID BULB EDV: -22.7 CM/SEC
BH CV XLRA MEAS LEFT CAROTID BULB PSV: -112.9 CM/SEC
BH CV XLRA MEAS LEFT DIST CCA EDV: 23.5 CM/SEC
BH CV XLRA MEAS LEFT DIST CCA PSV: 93.3 CM/SEC
BH CV XLRA MEAS LEFT DIST ICA EDV: -36.4 CM/SEC
BH CV XLRA MEAS LEFT DIST ICA PSV: -91.1 CM/SEC
BH CV XLRA MEAS LEFT ICA/CCA RATIO: 1.21
BH CV XLRA MEAS LEFT MID CCA EDV: 28.2 CM/SEC
BH CV XLRA MEAS LEFT MID CCA PSV: 90.9 CM/SEC
BH CV XLRA MEAS LEFT MID ICA EDV: -38.5 CM/SEC
BH CV XLRA MEAS LEFT MID ICA PSV: -94.6 CM/SEC
BH CV XLRA MEAS LEFT PROX CCA EDV: 27.4 CM/SEC
BH CV XLRA MEAS LEFT PROX CCA PSV: 101.1 CM/SEC
BH CV XLRA MEAS LEFT PROX ECA EDV: -21.2 CM/SEC
BH CV XLRA MEAS LEFT PROX ECA PSV: -123.9 CM/SEC
BH CV XLRA MEAS LEFT PROX ICA EDV: -21 CM/SEC
BH CV XLRA MEAS LEFT PROX ICA PSV: -56.7 CM/SEC
BH CV XLRA MEAS LEFT PROX SCLA PSV: 185.5 CM/SEC
BH CV XLRA MEAS LEFT VERTEBRAL A EDV: 25.9 CM/SEC
BH CV XLRA MEAS LEFT VERTEBRAL A PSV: 96.7 CM/SEC
BH CV XLRA MEAS RIGHT CAROTID BULB EDV: -25.2 CM/SEC
BH CV XLRA MEAS RIGHT CAROTID BULB PSV: -91.1 CM/SEC
BH CV XLRA MEAS RIGHT DIST CCA EDV: 25.2 CM/SEC
BH CV XLRA MEAS RIGHT DIST CCA PSV: 86.2 CM/SEC
BH CV XLRA MEAS RIGHT DIST ICA EDV: -41.3 CM/SEC
BH CV XLRA MEAS RIGHT DIST ICA PSV: -112.8 CM/SEC
BH CV XLRA MEAS RIGHT ICA/CCA RATIO: 1.31
BH CV XLRA MEAS RIGHT MID CCA EDV: 26.1 CM/SEC
BH CV XLRA MEAS RIGHT MID CCA PSV: 93.9 CM/SEC
BH CV XLRA MEAS RIGHT MID ICA EDV: -29.4 CM/SEC
BH CV XLRA MEAS RIGHT MID ICA PSV: -84.1 CM/SEC
BH CV XLRA MEAS RIGHT PROX CCA EDV: 24.2 CM/SEC
BH CV XLRA MEAS RIGHT PROX CCA PSV: 104.6 CM/SEC
BH CV XLRA MEAS RIGHT PROX ECA EDV: 41.7 CM/SEC
BH CV XLRA MEAS RIGHT PROX ECA PSV: 313.9 CM/SEC
BH CV XLRA MEAS RIGHT PROX ICA EDV: -31.5 CM/SEC
BH CV XLRA MEAS RIGHT PROX ICA PSV: -111.4 CM/SEC
BH CV XLRA MEAS RIGHT PROX SCLA PSV: 151.1 CM/SEC
BH CV XLRA MEAS RIGHT VERTEBRAL A EDV: 18.9 CM/SEC
BH CV XLRA MEAS RIGHT VERTEBRAL A PSV: 61.6 CM/SEC
LEFT ARM BP: NORMAL MMHG
RIGHT ARM BP: NORMAL MMHG

## 2024-12-09 ENCOUNTER — OFFICE VISIT (OUTPATIENT)
Dept: CARDIOLOGY | Facility: CLINIC | Age: 64
End: 2024-12-09
Payer: MEDICARE

## 2024-12-09 VITALS
BODY MASS INDEX: 33.87 KG/M2 | OXYGEN SATURATION: 96 % | SYSTOLIC BLOOD PRESSURE: 130 MMHG | HEIGHT: 70 IN | DIASTOLIC BLOOD PRESSURE: 80 MMHG | WEIGHT: 236.6 LBS | HEART RATE: 76 BPM

## 2024-12-09 DIAGNOSIS — Z98.890 HISTORY OF LEFT-SIDED CAROTID ENDARTERECTOMY: ICD-10-CM

## 2024-12-09 DIAGNOSIS — Q21.12 PATENT FORAMEN OVALE: ICD-10-CM

## 2024-12-09 DIAGNOSIS — I10 ESSENTIAL HYPERTENSION: Primary | ICD-10-CM

## 2024-12-09 PROCEDURE — 3079F DIAST BP 80-89 MM HG: CPT | Performed by: NURSE PRACTITIONER

## 2024-12-09 PROCEDURE — 1159F MED LIST DOCD IN RCRD: CPT | Performed by: NURSE PRACTITIONER

## 2024-12-09 PROCEDURE — 1160F RVW MEDS BY RX/DR IN RCRD: CPT | Performed by: NURSE PRACTITIONER

## 2024-12-09 PROCEDURE — 93000 ELECTROCARDIOGRAM COMPLETE: CPT | Performed by: NURSE PRACTITIONER

## 2024-12-09 PROCEDURE — 99214 OFFICE O/P EST MOD 30 MIN: CPT | Performed by: NURSE PRACTITIONER

## 2024-12-09 PROCEDURE — 3075F SYST BP GE 130 - 139MM HG: CPT | Performed by: NURSE PRACTITIONER

## 2024-12-09 RX ORDER — ATORVASTATIN CALCIUM 80 MG/1
80 TABLET, FILM COATED ORAL NIGHTLY
Qty: 90 TABLET | Refills: 0 | OUTPATIENT
Start: 2024-12-09

## 2024-12-09 RX ORDER — ATORVASTATIN CALCIUM 80 MG/1
80 TABLET, FILM COATED ORAL NIGHTLY
Qty: 90 TABLET | Refills: 3 | Status: SHIPPED | OUTPATIENT
Start: 2024-12-09

## 2024-12-09 NOTE — PROGRESS NOTES
"Date of Office Visit: 24  Encounter Provider: FRANCISCO JAVIER Camejo  Place of Service: [facility] CARDIOLOGY  Patient Name: Rito Arnold  :1960    Chief Complaint   Patient presents with    Follow-up   :     HPI: Rito Arnold is a 64 y.o. male  with hypertension, PFO, bilateral carotid artery stenosis status post left carotid endarterectomy, retinal artery occlusion, And former tobacco use.    He has a family history of coronary artery disease with his father having surgical bypass in his 50s.    He is followed by Dr. Vu Roper. I will visit him for the first time and have reviewed his medical record.  His ophthalmologist was concern for retinal branch occlusion and sent him to [facility] emergency room for workup in 2023.  MRI was negative for acute infarct.  CTA of the head and neck showed 70% left and right carotid artery stenosis.  He was taken for CEA of left carotid which was an uncomplicated surgery.  He had an echocardiogram which shows small PFO, normal ejection fraction and no significant valve disease.  He had elevated blood pressure and was started on amlodipine.    He presents today for annual reassessment.  He has been feeling well.  No chest pain or shortness of breath or edema dizziness or palpitation.  He had his eyes examined this year and everything was okay.  No bleeding from his aspirin.    No Known Allergies        Family and social history reviewed.     ROS  All other systems were reviewed and are negative          Objective:     Vitals:    24 0806   BP: 130/80   BP Location: Left arm   Patient Position: Sitting   Cuff Size: Adult   Pulse: 76   SpO2: 96%   Weight: 107 kg (236 lb 9.6 oz)   Height: 177.8 cm (70\")     Body mass index is 33.95 kg/m².    PHYSICAL EXAM:  Pulmonary:      Effort: Pulmonary effort is normal.      Breath sounds: Normal breath sounds.   Cardiovascular:      Normal rate. Regular rhythm.           ECG 12 " Lead    Date/Time: 12/9/2024 8:22 AM  Performed by: Margret Del Rio APRN    Authorized by: Margret Del Rio APRN  Comparison: compared with previous ECG   Similar to previous ECG  Rhythm: sinus rhythm  Rate: normal  T flattening: aVL  QRS axis: normal  Comments: No significant change            Current Outpatient Medications   Medication Sig Dispense Refill    amLODIPine (NORVASC) 5 MG tablet TAKE 1 TABLET BY MOUTH DAILY 90 tablet 1    aspirin 81 MG chewable tablet Chew 1 tablet Daily. 90 tablet 0    atorvastatin (LIPITOR) 80 MG tablet Take 1 tablet by mouth Every Night. Last refill before appointment. 90 tablet 0    HYDROcodone-acetaminophen (NORCO)  MG per tablet hydrocodone 10 mg-acetaminophen 325 mg tablet      losartan (COZAAR) 50 MG tablet TAKE 1 TABLET BY MOUTH DAILY 90 tablet 1     No current facility-administered medications for this visit.     Assessment:      No diagnosis found.     No orders of the defined types were placed in this encounter.        Plan:       1.  64-year-old gentleman with small PFO on echo April 2023.  Maintained on aspirin 81 mg daily  2. Carotid artery stenosis status post left carotid endarterectomy April 2023 with Less than 50% bilateral carotid artery stenosis on recent duplex 12/2024-follows with Dr. Ariel Quiros  3.  Hypertension blood pressure appears adequate today  4.  Former smoker quit April 2023  5.  Hyperlipidemia on atorvastatin 80 mg daily.  Target LDL less than 70  6.History of iron deficient anemia  7.  Family history of coronary artery disease with his father having bypass in his 50s  8.Mildly dilated distal abdominal aorta on AAA screen November 2023- consider repeat in 5 years.    Follow-up in 1 year.  Call sooner with any questions or concerns.          It has been a pleasure to participate in this patient's care.      Thank you,  FRANCISCO JAVIER Camejo      **I used Dragon to dictate this note:**

## 2025-01-15 ENCOUNTER — HOSPITAL ENCOUNTER (OUTPATIENT)
Facility: HOSPITAL | Age: 65
Discharge: HOME | End: 2025-01-15
Admitting: NURSE PRACTITIONER
Payer: MEDICARE

## 2025-01-15 DIAGNOSIS — E04.1 THYROID NODULE: ICD-10-CM

## 2025-01-15 PROCEDURE — 76536 US EXAM OF HEAD AND NECK: CPT

## 2025-01-20 ENCOUNTER — TELEPHONE (OUTPATIENT)
Age: 65
End: 2025-01-20

## 2025-01-20 NOTE — TELEPHONE ENCOUNTER
Caller: Rito Arnold    Relationship: Self    Best call back number: [phone]*    What is the best time to reach you: ANYTIME    Who are you requesting to speak with (clinical staff, provider,  specific staff member): CLINICAL MARY COLLINS    Do you know the name of the person who called: MARY    What was the call regarding: PT RECEIVED A CALL FROM MARY ABOUT HIS TEST RESULTS AND SAID THE VM ASKED HIM TO CALL BACK. THERE WAS NOT TE DOCUMENTED. PLEASE GIVE THE PT A CALL BACK     Is it okay if the provider responds through MyChart: NO

## 2025-05-22 RX ORDER — LOSARTAN POTASSIUM 50 MG/1
50 TABLET ORAL DAILY
Qty: 90 TABLET | Refills: 2 | Status: SHIPPED | OUTPATIENT
Start: 2025-05-22

## 2025-05-29 RX ORDER — AMLODIPINE BESYLATE 5 MG/1
5 TABLET ORAL DAILY
Qty: 90 TABLET | Refills: 1 | Status: SHIPPED | OUTPATIENT
Start: 2025-05-29

## (undated) DEVICE — VESSEL LOOPS X-RAY DETECTABLE: Brand: DEROYAL

## (undated) DEVICE — TBG PENCL TELESCP MEGADYNE SMOKE EVAC 10FT

## (undated) DEVICE — TUBING, SUCTION, 1/4" X 10', STRAIGHT: Brand: MEDLINE

## (undated) DEVICE — KT ORCA ORCAPOD DISP STRL

## (undated) DEVICE — 3M™ IOBAN™ 2 ANTIMICROBIAL INCISE DRAPE 6640EZ: Brand: IOBAN™ 2

## (undated) DEVICE — PK ENDART CARTOID 40

## (undated) DEVICE — SUT VIC 4/0 SH 27IN J415H

## (undated) DEVICE — SUT SILK 4/0 TIES 18IN A183H

## (undated) DEVICE — SUT SILK 2/0 TIES 18IN A185H

## (undated) DEVICE — THE SINGLE USE ETRAP – POLYP TRAP IS USED FOR SUCTION RETRIEVAL OF ENDOSCOPICALLY REMOVED POLYPS.: Brand: ETRAP

## (undated) DEVICE — SOL NS 500ML

## (undated) DEVICE — BITEBLOCK OMNI BLOC

## (undated) DEVICE — SNAR POLYP SENSATION STDOVL 27 240 BX40

## (undated) DEVICE — LN SMPL CO2 SHTRM SD STREAM W/M LUER

## (undated) DEVICE — ANTIBACTERIAL UNDYED BRAIDED (POLYGLACTIN 910), SYNTHETIC ABSORBABLE SUTURE: Brand: COATED VICRYL

## (undated) DEVICE — SUT SILK 3/0 SH CR5 18IN C0135

## (undated) DEVICE — CANN O2 ETCO2 FITS ALL CONN CO2 SMPL A/ 7IN DISP LF

## (undated) DEVICE — ADAPT CLN BIOGUARD AIR/H2O DISP

## (undated) DEVICE — SENSR O2 OXIMAX FNGR A/ 18IN NONSTR

## (undated) DEVICE — SUT SILK 3/0 TIES 18IN A184H

## (undated) DEVICE — TRAP FLD MINIVAC MEGADYNE 100ML

## (undated) DEVICE — GLV SURG BIOGEL LTX PF 7 1/2

## (undated) DEVICE — STERILE COTTON TIP 6IN 10PK: Brand: MEDLINE

## (undated) DEVICE — STPLR SKIN VISISTAT WD 35CT

## (undated) DEVICE — SUT PROLN 6/0 BV1 D/A 30IN 8709H